# Patient Record
Sex: FEMALE | Race: WHITE | NOT HISPANIC OR LATINO | Employment: OTHER | URBAN - METROPOLITAN AREA
[De-identification: names, ages, dates, MRNs, and addresses within clinical notes are randomized per-mention and may not be internally consistent; named-entity substitution may affect disease eponyms.]

---

## 2022-07-14 ENCOUNTER — APPOINTMENT (OUTPATIENT)
Dept: LAB | Facility: HOSPITAL | Age: 82
End: 2022-07-14
Payer: COMMERCIAL

## 2022-07-14 DIAGNOSIS — Z95.2 HEART VALVE REPLACED BY TRANSPLANT: ICD-10-CM

## 2022-07-14 LAB
INR PPP: 2.84 (ref 0.84–1.19)
PROTHROMBIN TIME: 28.4 SECONDS (ref 11.6–14.5)

## 2022-07-14 PROCEDURE — 85610 PROTHROMBIN TIME: CPT

## 2022-07-14 PROCEDURE — 36415 COLL VENOUS BLD VENIPUNCTURE: CPT

## 2022-07-21 ENCOUNTER — APPOINTMENT (OUTPATIENT)
Dept: LAB | Facility: HOSPITAL | Age: 82
End: 2022-07-21
Payer: COMMERCIAL

## 2022-10-06 ENCOUNTER — APPOINTMENT (OUTPATIENT)
Dept: LAB | Facility: HOSPITAL | Age: 82
End: 2022-10-06
Payer: COMMERCIAL

## 2022-10-08 ENCOUNTER — LAB REQUISITION (OUTPATIENT)
Dept: LAB | Facility: HOSPITAL | Age: 82
End: 2022-10-08
Payer: COMMERCIAL

## 2022-10-08 DIAGNOSIS — R32 UNSPECIFIED URINARY INCONTINENCE: ICD-10-CM

## 2022-10-08 LAB
BACTERIA UR QL AUTO: ABNORMAL /HPF
BILIRUB UR QL STRIP: NEGATIVE
CLARITY UR: CLEAR
COLOR UR: ABNORMAL
GLUCOSE UR STRIP-MCNC: NEGATIVE MG/DL
HGB UR QL STRIP.AUTO: NEGATIVE
KETONES UR STRIP-MCNC: NEGATIVE MG/DL
LEUKOCYTE ESTERASE UR QL STRIP: ABNORMAL
NITRITE UR QL STRIP: NEGATIVE
NON-SQ EPI CELLS URNS QL MICRO: ABNORMAL /HPF
PH UR STRIP.AUTO: 6.5 [PH]
PROT UR STRIP-MCNC: NEGATIVE MG/DL
RBC #/AREA URNS AUTO: ABNORMAL /HPF
SP GR UR STRIP.AUTO: 1.01 (ref 1–1.03)
UROBILINOGEN UR STRIP-ACNC: <2 MG/DL
WBC #/AREA URNS AUTO: ABNORMAL /HPF

## 2022-10-08 PROCEDURE — 81001 URINALYSIS AUTO W/SCOPE: CPT | Performed by: FAMILY MEDICINE

## 2022-10-13 ENCOUNTER — APPOINTMENT (OUTPATIENT)
Dept: LAB | Facility: HOSPITAL | Age: 82
End: 2022-10-13
Payer: COMMERCIAL

## 2022-10-13 DIAGNOSIS — Z95.2 HEART VALVE REPLACED BY TRANSPLANT: ICD-10-CM

## 2022-10-13 DIAGNOSIS — I48.11 LONGSTANDING PERSISTENT ATRIAL FIBRILLATION (HCC): ICD-10-CM

## 2022-10-13 DIAGNOSIS — I10 ESSENTIAL HYPERTENSION, MALIGNANT: ICD-10-CM

## 2022-10-13 LAB
ALBUMIN SERPL BCP-MCNC: 4 G/DL (ref 3.5–5)
ALP SERPL-CCNC: 111 U/L (ref 46–116)
ALT SERPL W P-5'-P-CCNC: 25 U/L (ref 12–78)
ANION GAP SERPL CALCULATED.3IONS-SCNC: 7 MMOL/L (ref 4–13)
AST SERPL W P-5'-P-CCNC: 19 U/L (ref 5–45)
BASOPHILS # BLD AUTO: 0.08 THOUSANDS/ΜL (ref 0–0.1)
BASOPHILS NFR BLD AUTO: 1 % (ref 0–1)
BILIRUB SERPL-MCNC: 0.4 MG/DL (ref 0.2–1)
BUN SERPL-MCNC: 13 MG/DL (ref 5–25)
CALCIUM SERPL-MCNC: 9.6 MG/DL (ref 8.3–10.1)
CHLORIDE SERPL-SCNC: 98 MMOL/L (ref 96–108)
CO2 SERPL-SCNC: 29 MMOL/L (ref 21–32)
CREAT SERPL-MCNC: 0.77 MG/DL (ref 0.6–1.3)
EOSINOPHIL # BLD AUTO: 0.21 THOUSAND/ΜL (ref 0–0.61)
EOSINOPHIL NFR BLD AUTO: 3 % (ref 0–6)
ERYTHROCYTE [DISTWIDTH] IN BLOOD BY AUTOMATED COUNT: 14.9 % (ref 11.6–15.1)
GFR SERPL CREATININE-BSD FRML MDRD: 72 ML/MIN/1.73SQ M
GLUCOSE P FAST SERPL-MCNC: 94 MG/DL (ref 65–99)
HCT VFR BLD AUTO: 41.5 % (ref 34.8–46.1)
HGB BLD-MCNC: 12.5 G/DL (ref 11.5–15.4)
IMM GRANULOCYTES # BLD AUTO: 0.04 THOUSAND/UL (ref 0–0.2)
IMM GRANULOCYTES NFR BLD AUTO: 1 % (ref 0–2)
LYMPHOCYTES # BLD AUTO: 0.72 THOUSANDS/ΜL (ref 0.6–4.47)
LYMPHOCYTES NFR BLD AUTO: 9 % (ref 14–44)
MCH RBC QN AUTO: 28.6 PG (ref 26.8–34.3)
MCHC RBC AUTO-ENTMCNC: 30.1 G/DL (ref 31.4–37.4)
MCV RBC AUTO: 95 FL (ref 82–98)
MONOCYTES # BLD AUTO: 0.68 THOUSAND/ΜL (ref 0.17–1.22)
MONOCYTES NFR BLD AUTO: 9 % (ref 4–12)
NEUTROPHILS # BLD AUTO: 6.07 THOUSANDS/ΜL (ref 1.85–7.62)
NEUTS SEG NFR BLD AUTO: 77 % (ref 43–75)
NRBC BLD AUTO-RTO: 0 /100 WBCS
PLATELET # BLD AUTO: 286 THOUSANDS/UL (ref 149–390)
PMV BLD AUTO: 10.8 FL (ref 8.9–12.7)
POTASSIUM SERPL-SCNC: 3.8 MMOL/L (ref 3.5–5.3)
PROT SERPL-MCNC: 7.4 G/DL (ref 6.4–8.4)
RBC # BLD AUTO: 4.37 MILLION/UL (ref 3.81–5.12)
SODIUM SERPL-SCNC: 134 MMOL/L (ref 135–147)
WBC # BLD AUTO: 7.8 THOUSAND/UL (ref 4.31–10.16)

## 2022-10-13 PROCEDURE — 80053 COMPREHEN METABOLIC PANEL: CPT

## 2022-10-13 PROCEDURE — 85025 COMPLETE CBC W/AUTO DIFF WBC: CPT

## 2022-10-20 ENCOUNTER — APPOINTMENT (OUTPATIENT)
Dept: LAB | Facility: HOSPITAL | Age: 82
End: 2022-10-20
Payer: COMMERCIAL

## 2022-10-20 DIAGNOSIS — Z95.2 PRESENCE OF PROSTHETIC HEART VALVE: ICD-10-CM

## 2022-10-20 LAB
INR PPP: 2.47 (ref 0.84–1.19)
PROTHROMBIN TIME: 27.2 SECONDS (ref 11.6–14.5)

## 2022-10-20 PROCEDURE — 85610 PROTHROMBIN TIME: CPT

## 2022-10-20 PROCEDURE — 36415 COLL VENOUS BLD VENIPUNCTURE: CPT

## 2022-10-27 ENCOUNTER — APPOINTMENT (OUTPATIENT)
Dept: LAB | Facility: HOSPITAL | Age: 82
End: 2022-10-27
Payer: COMMERCIAL

## 2022-10-27 DIAGNOSIS — Z95.2 PRESENCE OF PROSTHETIC HEART VALVE: ICD-10-CM

## 2022-10-27 LAB
INR PPP: 2.23 (ref 0.84–1.19)
PROTHROMBIN TIME: 25 SECONDS (ref 11.6–14.5)

## 2022-10-27 PROCEDURE — 36415 COLL VENOUS BLD VENIPUNCTURE: CPT

## 2022-10-27 PROCEDURE — 85610 PROTHROMBIN TIME: CPT

## 2022-11-03 ENCOUNTER — APPOINTMENT (OUTPATIENT)
Dept: LAB | Facility: HOSPITAL | Age: 82
End: 2022-11-03

## 2022-11-03 DIAGNOSIS — Z95.2 PRESENCE OF PROSTHETIC HEART VALVE: ICD-10-CM

## 2022-11-03 LAB
INR PPP: 2.8 (ref 0.84–1.19)
PROTHROMBIN TIME: 29.7 SECONDS (ref 11.6–14.5)

## 2022-11-10 ENCOUNTER — APPOINTMENT (OUTPATIENT)
Dept: LAB | Facility: HOSPITAL | Age: 82
End: 2022-11-10

## 2022-11-10 DIAGNOSIS — Z95.2 PRESENCE OF PROSTHETIC HEART VALVE: ICD-10-CM

## 2022-11-10 LAB
INR PPP: 2.96 (ref 0.84–1.19)
PROTHROMBIN TIME: 31.1 SECONDS (ref 11.6–14.5)

## 2022-11-17 ENCOUNTER — APPOINTMENT (OUTPATIENT)
Dept: LAB | Facility: HOSPITAL | Age: 82
End: 2022-11-17

## 2022-11-17 DIAGNOSIS — Z95.2 PRESENCE OF PROSTHETIC HEART VALVE: ICD-10-CM

## 2022-11-17 LAB
INR PPP: 3.16 (ref 0.84–1.19)
PROTHROMBIN TIME: 32.7 SECONDS (ref 11.6–14.5)

## 2022-11-23 ENCOUNTER — APPOINTMENT (OUTPATIENT)
Dept: LAB | Facility: HOSPITAL | Age: 82
End: 2022-11-23

## 2022-11-23 DIAGNOSIS — Z95.2 PRESENCE OF PROSTHETIC HEART VALVE: ICD-10-CM

## 2022-11-23 LAB
INR PPP: 4.02 (ref 0.84–1.19)
PROTHROMBIN TIME: 39.4 SECONDS (ref 11.6–14.5)

## 2022-12-01 ENCOUNTER — APPOINTMENT (OUTPATIENT)
Dept: LAB | Facility: HOSPITAL | Age: 82
End: 2022-12-01

## 2022-12-01 DIAGNOSIS — Z95.2 PRESENCE OF PROSTHETIC HEART VALVE: ICD-10-CM

## 2022-12-01 LAB
INR PPP: 1.87 (ref 0.84–1.19)
PROTHROMBIN TIME: 22 SECONDS (ref 11.6–14.5)

## 2022-12-08 ENCOUNTER — APPOINTMENT (OUTPATIENT)
Dept: LAB | Facility: HOSPITAL | Age: 82
End: 2022-12-08

## 2022-12-08 DIAGNOSIS — Z95.2 HEART VALVE REPLACED BY TRANSPLANT: ICD-10-CM

## 2022-12-08 LAB
INR PPP: 2.22 (ref 0.84–1.19)
PROTHROMBIN TIME: 24.9 SECONDS (ref 11.6–14.5)

## 2022-12-15 ENCOUNTER — APPOINTMENT (OUTPATIENT)
Dept: LAB | Facility: HOSPITAL | Age: 82
End: 2022-12-15

## 2022-12-22 ENCOUNTER — APPOINTMENT (OUTPATIENT)
Dept: LAB | Facility: HOSPITAL | Age: 82
End: 2022-12-22

## 2022-12-29 ENCOUNTER — APPOINTMENT (OUTPATIENT)
Dept: LAB | Facility: HOSPITAL | Age: 82
End: 2022-12-29

## 2022-12-29 DIAGNOSIS — D64.9 ANEMIA, UNSPECIFIED TYPE: ICD-10-CM

## 2022-12-29 DIAGNOSIS — Z96.20 PRESENCE OF OTOLOGICAL AND AUDIOLOGICAL IMPLANT, UNSPECIFIED: ICD-10-CM

## 2022-12-29 LAB
ANION GAP SERPL CALCULATED.3IONS-SCNC: 6 MMOL/L (ref 4–13)
BUN SERPL-MCNC: 8 MG/DL (ref 5–25)
CALCIUM SERPL-MCNC: 9 MG/DL (ref 8.3–10.1)
CHLORIDE SERPL-SCNC: 95 MMOL/L (ref 96–108)
CO2 SERPL-SCNC: 34 MMOL/L (ref 21–32)
CREAT SERPL-MCNC: 0.83 MG/DL (ref 0.6–1.3)
FOLATE SERPL-MCNC: >20 NG/ML (ref 3.1–17.5)
GFR SERPL CREATININE-BSD FRML MDRD: 65 ML/MIN/1.73SQ M
GLUCOSE SERPL-MCNC: 87 MG/DL (ref 65–140)
INR PPP: 5.57 (ref 0.84–1.19)
POTASSIUM SERPL-SCNC: 4.2 MMOL/L (ref 3.5–5.3)
PROTHROMBIN TIME: 52.6 SECONDS (ref 11.6–14.5)
SODIUM SERPL-SCNC: 135 MMOL/L (ref 135–147)

## 2023-01-05 ENCOUNTER — APPOINTMENT (OUTPATIENT)
Dept: LAB | Facility: HOSPITAL | Age: 83
End: 2023-01-05

## 2023-01-19 ENCOUNTER — APPOINTMENT (OUTPATIENT)
Dept: LAB | Facility: HOSPITAL | Age: 83
End: 2023-01-19

## 2023-01-23 ENCOUNTER — HOSPITAL ENCOUNTER (EMERGENCY)
Facility: HOSPITAL | Age: 83
Discharge: HOME | End: 2023-01-23
Attending: EMERGENCY MEDICINE

## 2023-01-23 ENCOUNTER — APPOINTMENT (EMERGENCY)
Dept: RADIOLOGY | Facility: HOSPITAL | Age: 83
End: 2023-01-23

## 2023-01-23 ENCOUNTER — APPOINTMENT (EMERGENCY)
Dept: CT IMAGING | Facility: HOSPITAL | Age: 83
End: 2023-01-23

## 2023-01-23 VITALS
WEIGHT: 162 LBS | SYSTOLIC BLOOD PRESSURE: 139 MMHG | TEMPERATURE: 98.4 F | RESPIRATION RATE: 18 BRPM | BODY MASS INDEX: 26.03 KG/M2 | DIASTOLIC BLOOD PRESSURE: 61 MMHG | OXYGEN SATURATION: 95 % | HEIGHT: 66 IN | HEART RATE: 78 BPM

## 2023-01-23 DIAGNOSIS — S29.012A STRAIN OF THORACIC PARASPINAL MUSCLES EXCLUDING T1 AND T2 LEVELS: ICD-10-CM

## 2023-01-23 DIAGNOSIS — S39.012A LUMBAR STRAIN, INITIAL ENCOUNTER: ICD-10-CM
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
## 2023-01-23 LAB
ANION GAP SERPL CALCULATED.3IONS-SCNC: 7 MMOL/L (ref 4–13)
BASOPHILS # BLD AUTO: 0.07 THOUSANDS/ÂΜL (ref 0–0.1)
BASOPHILS NFR BLD AUTO: 1 % (ref 0–1)
BILIRUB UR QL STRIP: NEGATIVE
BUN SERPL-MCNC: 9 MG/DL (ref 5–25)
CALCIUM SERPL-MCNC: 9 MG/DL (ref 8.3–10.1)
CHLORIDE SERPL-SCNC: 94 MMOL/L (ref 96–108)
CLARITY UR: CLEAR
CO2 SERPL-SCNC: 34 MMOL/L (ref 21–32)
COLOR UR: NORMAL
CREAT SERPL-MCNC: 0.83 MG/DL (ref 0.6–1.3)
EOSINOPHIL # BLD AUTO: 0.13 THOUSAND/ÂΜL (ref 0–0.61)
EOSINOPHIL NFR BLD AUTO: 2 % (ref 0–6)
ERYTHROCYTE [DISTWIDTH] IN BLOOD BY AUTOMATED COUNT: 15.3 % (ref 11.6–15.1)
GFR SERPL CREATININE-BSD FRML MDRD: 65 ML/MIN/1.73SQ M
GLUCOSE SERPL-MCNC: 108 MG/DL (ref 65–140)
GLUCOSE UR STRIP-MCNC: NEGATIVE MG/DL
HCT VFR BLD AUTO: 38.1 % (ref 34.8–46.1)
HGB BLD-MCNC: 12.2 G/DL (ref 11.5–15.4)
HGB UR QL STRIP.AUTO: NEGATIVE
IMM GRANULOCYTES # BLD AUTO: 0.05 THOUSAND/UL (ref 0–0.2)
IMM GRANULOCYTES NFR BLD AUTO: 1 % (ref 0–2)
INR PPP: 3.48 (ref 0.84–1.19)
KETONES UR STRIP-MCNC: NEGATIVE MG/DL
LEUKOCYTE ESTERASE UR QL STRIP: NEGATIVE
LYMPHOCYTES # BLD AUTO: 0.85 THOUSANDS/ÂΜL (ref 0.6–4.47)
LYMPHOCYTES NFR BLD AUTO: 11 % (ref 14–44)
MCH RBC QN AUTO: 29.7 PG (ref 26.8–34.3)
MCHC RBC AUTO-ENTMCNC: 32 G/DL (ref 31.4–37.4)
MCV RBC AUTO: 93 FL (ref 82–98)
MONOCYTES # BLD AUTO: 0.72 THOUSAND/ÂΜL (ref 0.17–1.22)
MONOCYTES NFR BLD AUTO: 10 % (ref 4–12)
NEUTROPHILS # BLD AUTO: 5.63 THOUSANDS/ÂΜL (ref 1.85–7.62)
NEUTS SEG NFR BLD AUTO: 75 % (ref 43–75)
NITRITE UR QL STRIP: NEGATIVE
NRBC BLD AUTO-RTO: 0 /100 WBCS
PH UR STRIP.AUTO: 7.5 [PH]
PLATELET # BLD AUTO: 249 THOUSANDS/UL (ref 149–390)
PMV BLD AUTO: 9.2 FL (ref 8.9–12.7)
POTASSIUM SERPL-SCNC: 3.7 MMOL/L (ref 3.5–5.3)
PROT UR STRIP-MCNC: NEGATIVE MG/DL
PROTHROMBIN TIME: 36.6 SECONDS (ref 11.6–14.5)
RBC # BLD AUTO: 4.11 MILLION/UL (ref 3.81–5.12)
SODIUM SERPL-SCNC: 135 MMOL/L (ref 135–147)
SP GR UR STRIP.AUTO: 1.01 (ref 1–1.03)
UROBILINOGEN UR STRIP-ACNC: <2 MG/DL
WBC # BLD AUTO: 7.45 THOUSAND/UL (ref 4.31–10.16)

## 2023-01-23 RX ORDER — LIDOCAINE 50 MG/G
1 PATCH TOPICAL ONCE
Status: DISCONTINUED | OUTPATIENT
Start: 2023-01-23 | End: 2023-01-23 | Stop reason: HOSPADM

## 2023-01-23 RX ORDER — ACETAMINOPHEN 325 MG/1
650 TABLET ORAL ONCE
Status: COMPLETED | OUTPATIENT
Start: 2023-01-23 | End: 2023-01-23

## 2023-01-23 RX ADMIN — ACETAMINOPHEN 650 MG: 325 TABLET ORAL at 17:54

## 2023-01-23 RX ADMIN — LIDOCAINE 5% 1 PATCH: 700 PATCH TOPICAL at 17:54

## 2023-01-23 NOTE — DISCHARGE INSTRUCTIONS
Continue Tylenol and lidocaine patch as needed  Consider using a different topical analgesic cream or patch if needed  Continue present medications  Follow-up with your doctor as needed

## 2023-01-23 NOTE — ED PROVIDER NOTES
Emergency Department Trauma Note  Ramos Bowers 80 y o  female MRN: [id number]  Unit/Bed#: ED 11/ED 11 Encounter: [id number]      Trauma Alert: Trauma Acuity: Trauma Evaluation  Model of Arrival: Mode of Arrival: ALS via Trauma Squad Name and Number: Zaynab Jordan Team: Current Providers  Attending Provider: Casie Fleming DO  Registered Nurse: Santos Bolton RN  Consultants:     None      History of Present Illness     Chief Complaint:   Chief Complaint   Patient presents with   • Fall     Patient presents to the ER via EMS from [employer] with pain in her right flank post falling in the shower today  HPI:  Ramos Bowers is a 80 y o  female who presents with back and right flank pain  Mechanism:Details of Incident: Patient reportedly slipped and fell in the shower at [employer] injuring her right flank  Injury Date: 01/23/23 Injury Time: 1200 Injury Occurence Location - [address] [place] [address] Way: [facility]    80year-old female with history of COPD, aortic valve replacement, permanent A  fib, permanent pacemaker implantation, hypertension, hyperlipidemia, SAM with use of BiPAP and epilepsy was showering at nursing home today  She typically has to hold onto the grab bars but today her hand was soapy and slippery  She fell backwards  Complains of pain in the right flank area where she had prior rib fractures  Denies injury to head, dyspnea, abdominal pain and injury to pelvis or extremities  Denies loss of consciousness and focal neurologic changes  Nursing staff came up on her awake and helped her up just after her fall  Review of Systems   Constitutional: Negative for fever  Respiratory: Negative for shortness of breath  Cardiovascular: Negative for chest pain  Gastrointestinal: Negative for abdominal pain  Musculoskeletal: Positive for arthralgias, back pain, gait problem and myalgias     Neurological: Negative for dizziness, light-headedness and numbness  Historical Information     Immunizations: There is no immunization history on file for this patient  Past Medical History:   Diagnosis Date   • Anemia    • Anxiety disorder, unspecified    • Anxiety disorder, unspecified    • Cardiac arrest, cause unspecified ([facility] [address] )    • Chronic obstructive pulmonary disease, unspecified ([facility] [address] )    • Constipation    • Essential (primary) hypertension    • GERD (gastroesophageal reflux disease)    • Hypo-osmolality and hyponatremia    • Hypothyroidism    • Nontraumatic hematoma of soft tissue    • Other seizures (HCC)    • Persistent atrial fibrillation (HCC)    • Unspecified urinary incontinence      History reviewed  No pertinent family history  History reviewed  No pertinent surgical history  Social History     Tobacco Use   • Smoking status: Never   • Smokeless tobacco: Never   Vaping Use   • Vaping Use: Never used   Substance Use Topics   • Alcohol use: Not Currently   • Drug use: Never     E-Cigarette/Vaping   • E-Cigarette Use Never User      E-Cigarette/Vaping Substances       Family History: non-contributory    Meds/Allergies   None       Allergies   Allergen Reactions   • Erythromycin Photosensitivity   • Ephedrine Rash   • Iodinated Contrast Media Rash   • Latex Rash   • Nsaids Rash   • Povidone Iodine Rash   • Salicylates Rash   • Shrimp (Diagnostic) - Food Allergy Rash   • Sympathomimetics Rash   • Tiotropium Rash   • Tositumomab Rash       PHYSICAL EXAM    PE limited by: Flank pain and low back pain    Objective   Vitals:   First set: Temperature: 98 2 °F (36 8 °C) (01/23/23 1423)  Pulse: 79 (01/23/23 1423)  Respirations: 18 (01/23/23 1423)  Blood Pressure: 139/73 (01/23/23 1423)  SpO2: 95 % (01/23/23 1423)    Primary Survey:   (A) Airway: Normal vocalizations    (B) Breathing: Symmetric air intake and chest rise  (C) Circulation: Pulses:   normal  (D) Disabliity:  GCS Total:  15  (E) Expose:  Completed    Secondary Survey: (Click on Physical Exam tab above)  Physical Exam  Vitals and nursing note reviewed  Constitutional:       General: She is in acute distress  Appearance: She is well-developed and normal weight  She is not ill-appearing or diaphoretic  HENT:      Head: Normocephalic and atraumatic  Right Ear: Tympanic membrane, ear canal and external ear normal       Left Ear: Tympanic membrane, ear canal and external ear normal       Nose: Nose normal       Mouth/Throat:      Mouth: Mucous membranes are moist       Pharynx: Oropharynx is clear  Eyes:      General: No scleral icterus  Conjunctiva/sclera: Conjunctivae normal       Pupils: Pupils are equal, round, and reactive to light  Neck:      Vascular: No carotid bruit  Cardiovascular:      Rate and Rhythm: Normal rate and regular rhythm  Pulses: Normal pulses  Heart sounds: Normal heart sounds  No murmur heard  Pulmonary:      Effort: Pulmonary effort is normal       Breath sounds: Normal breath sounds  Chest:      Chest wall: Tenderness:  Tendernesx over right ribs 10-12 without ecchymosis or swelling  No crepitance appreciated  Abdominal:      General: Bowel sounds are normal       Palpations: Abdomen is soft  There is no mass  Tenderness: There is no abdominal tenderness  There is no guarding or rebound  Musculoskeletal:         General: Tenderness ( Right lower postrior ribs, midline lumbar spine) present  No deformity  Normal range of motion  Cervical back: Normal range of motion and neck supple  No tenderness  Skin:     General: Skin is warm and dry  Capillary Refill: Capillary refill takes less than 2 seconds  Findings: No bruising, lesion or rash  Neurological:      General: No focal deficit present  Mental Status: She is alert and oriented to person, place, and time  Mental status is at baseline  Cranial Nerves: No cranial nerve deficit  Sensory: No sensory deficit  Motor: No weakness  Coordination: Coordination normal       Gait: Gait abnormal       Deep Tendon Reflexes: Reflexes are normal and symmetric  Psychiatric:         Mood and Affect: Mood normal          Behavior: Behavior normal          Cervical spine cleared by clinical criteria?  No (imaging required)      Invasive Devices     None                 Lab Results:   Results Reviewed     Procedure Component Value Units Date/Time    UA w Reflex to Microscopic w Reflex to Culture [[id number]] Collected: 01/23/23 1632    Lab Status: Final result Specimen: Urine, Clean Catch Updated: 01/23/23 1714     Color, UA Light Yellow     Clarity, UA Clear     Specific Gravity, UA 1 015     pH, UA 7 5     Leukocytes, UA Negative     Nitrite, UA Negative     Protein, UA Negative mg/dl      Glucose, UA Negative mg/dl      Ketones, UA Negative mg/dl      Urobilinogen, UA <2 0 mg/dl      Bilirubin, UA Negative     Occult Blood, UA Negative    Protime-INR [[id number]]  (Abnormal) Collected: 01/23/23 1513    Lab Status: Final result Specimen: Blood from Arm, Right Updated: 01/23/23 1531     Protime 36 6 seconds      INR 3 90    Basic metabolic panel [[id number]]  (Abnormal) Collected: 01/23/23 1441    Lab Status: Final result Specimen: Blood from Arm, Right Updated: 01/23/23 1502     Sodium 135 mmol/L      Potassium 3 7 mmol/L      Chloride 94 mmol/L      CO2 34 mmol/L      ANION GAP 7 mmol/L      BUN 9 mg/dL      Creatinine 0 83 mg/dL      Glucose 108 mg/dL      Calcium 9 0 mg/dL      eGFR 65 ml/min/1 73sq m     Narrative:      [place] guidelines for Chronic Kidney Disease (CKD):   •  Stage 1 with normal or high GFR (GFR > 90 mL/min/1 73 square meters)  •  Stage 2 Mild CKD (GFR = 60-89 mL/min/1 73 square meters)  •  Stage 3A Moderate CKD (GFR = 45-59 mL/min/1 73 square meters)  •  Stage 3B Moderate CKD (GFR = 30-44 mL/min/1 73 square meters)  •  Stage 4 Severe CKD (GFR = 15-29 mL/min/1 73 square meters)  •  Stage 5 End Stage CKD (GFR <15 mL/min/1 73 square meters)  Note: GFR calculation is accurate only with a steady state creatinine    CBC and differential [[id number]]  (Abnormal) Collected: 01/23/23 1441    Lab Status: Final result Specimen: Blood from Arm, Right Updated: 01/23/23 1448     WBC 7 45 Thousand/uL      RBC 4 11 Million/uL      Hemoglobin 12 2 g/dL      Hematocrit 38 1 %      MCV 93 fL      MCH 29 7 pg      MCHC 32 0 g/dL      RDW 15 3 %      MPV 9 2 fL      Platelets 804 Thousands/uL      nRBC 0 /100 WBCs      Neutrophils Relative 75 %      Immat GRANS % 1 %      Lymphocytes Relative 11 %      Monocytes Relative 10 %      Eosinophils Relative 2 %      Basophils Relative 1 %      Neutrophils Absolute 5 63 Thousands/µL      Immature Grans Absolute 0 05 Thousand/uL      Lymphocytes Absolute 0 85 Thousands/µL      Monocytes Absolute 0 72 Thousand/µL      Eosinophils Absolute 0 13 Thousand/µL      Basophils Absolute 0 07 Thousands/µL                  Imaging Studies:   Direct to CT: No  TRAUMA - CT head wo contrast   Final Result by David Bella MD (01/23 2627)      No acute intracranial abnormality  Generalized atrophy and moderate cerebral chronic microangiopathic disease  Workstation performed: [id number]         TRAUMA - CT spine cervical wo contrast   Final Result by David Bella MD (01/23 7846)      No cervical spine fracture or traumatic malalignment  Multilevel cervical spondylosis as described above  Visualized upper chest demonstrates multiple bilateral pulmonary nodules, the largest noted in the left upper lobe measures 6 mm  Please see the separate CT chest, abdomen and pelvis study report recommendation is on appropriate follow-up  Workstation performed: [id number]         CT recon only lumbar spine   Final Result by David Bella MD (01/23 4550)      Partially corticated right L1 transverse process fracture is likely subacute to chronic    Additional finding of healing right T11 and T12 rib fractures  Workstation performed: [id number]         CT chest abdomen pelvis wo contrast   Final Result by Rebeca Ross MD (01/23 1642)      Numerous small pulmonary nodules, measuring up to 6 mm, chronicity unknown  These could be the sequela of prior fungal or mycobacterial infection  However, pulmonary metastases could have a similar appearance  Several small bilateral nonobstructing renal calculi  Large left inguinal hernia containing a long segment of descending colon and proximal sigmoid, without evidence of obstruction or incarceration  Several chronic fractures described above  No evidence of acute fracture  No evidence of acute abnormality in the chest, abdomen or pelvis  Workstation performed: [id number]         XR Trauma chest portable   Final Result by Adriana Marcelino MD (01/23 2239)      No acute cardiopulmonary disease  Left-sided rib fractures seen on subsequent CT are not well evaluated by radiograph  Workstation performed: [id number]         XR Trauma pelvis ap only 1 or 2 vw   Final Result by Adriana Marcelino MD (01/23 9446)      No acute osseous abnormality  Workstation performed: [id number]               Procedures  ECG 12 Lead Documentation Only    Date/Time: 1/23/2023 3:25 PM  Performed by: Mari Taylor DO  Authorized by: Mari Taylor DO     ECG reviewed by me, the ED Provider: yes    Patient location:  ED  Previous ECG:     Previous ECG:  Unavailable    Comparison to cardiac monitor: Yes               ED Course  ED Course as of 01/26/23 1802   Mon Jan 23, 2023   1636 Despite ordering trauma imaging, still waiting for the CT results   1702 Labs and imaging reviewed  Spoke again with patient  She feels she probably can make it back to the assisted living home with her Rollator, Tylenol and lidocaine patch  We will medicate her and trial her on her feet     [address] on ambulating patient with expectation that she will be discharged  Nurse has been busy  1858 Ambulates well with walker  Nephew here to pick her up  Medical Decision Making  Slip and fall from standing with acute right flank/chest wall pain  No syncope, LOC and no sign of ACS, CVA, major injury  On warfarin for AVR and afib  No head strike  Labs, imaging and repeat evaluation reassuring  INR appropriately therapeutic, no evidence of bleeding  Ambulatory with walker  Stable for D/C  Return instructions given  Fall from standing, initial encounter: acute illness or injury  Lumbar strain, initial encounter: acute illness or injury  Strain of thoracic paraspinal muscles excluding T1 and T2 levels: acute illness or injury  Amount and/or Complexity of Data Reviewed  Labs: ordered  Radiology: ordered  Risk  OTC drugs  Disposition  [facility] Transfer: No  Final diagnoses:   Fall from standing, initial encounter   Strain of thoracic paraspinal muscles excluding T1 and T2 levels   Lumbar strain, initial encounter     Time reflects when diagnosis was documented in both MDM as applicable and the Disposition within this note     Time User Action Codes Description Comment    1/23/2023  4:51 PM Tho Pritchard Add [C60  XXXA] Fall from standing, initial encounter     1/23/2023  4:51 PM Tho Pritchard Add [A12 734E] Strain of thoracic paraspinal muscles excluding T1 and T2 levels     1/23/2023  4:51 PM Tho Pritchard Add [G28 164V] Lumbar strain, initial encounter       ED Disposition     ED Disposition   Discharge    Condition   Stable    Date/Time   Mon Jan 23, 2023  4:51 PM    Comment   Caroline Kennedy DAYBREAK OF [place] discharge to home/self care  Follow-up Information     Follow up With Specialties Details Why Contact Info    Ervin Quiros MD  Call  As needed [address] [place] [address] [id number] [phone]          There are no discharge medications for this patient      No discharge procedures on file      PDMP Review     None          ED Provider  Electronically Signed by         Aubrey Méndez DO  01/26/23 2551

## 2023-01-25 LAB
ATRIAL RATE: 100 BPM
QRS AXIS: 45 DEGREES
QRSD INTERVAL: 90 MS
QT INTERVAL: 364 MS
QTC INTERVAL: 440 MS
T WAVE AXIS: 84 DEGREES
VENTRICULAR RATE: 88 BPM

## 2023-01-26 ENCOUNTER — APPOINTMENT (OUTPATIENT)
Dept: LAB | Facility: HOSPITAL | Age: 83
End: 2023-01-26

## 2023-02-02 ENCOUNTER — APPOINTMENT (OUTPATIENT)
Dept: LAB | Facility: HOSPITAL | Age: 83
End: 2023-02-02

## 2023-02-02 DIAGNOSIS — Z95.2 HEART VALVE REPLACED BY TRANSPLANT: ICD-10-CM

## 2023-02-02 DIAGNOSIS — E87.1 HYPOSMOLALITY SYNDROME: ICD-10-CM

## 2023-02-02 LAB
ALBUMIN SERPL BCP-MCNC: 4.2 G/DL (ref 3.5–5)
ALP SERPL-CCNC: 118 U/L (ref 46–116)
ALT SERPL W P-5'-P-CCNC: 27 U/L (ref 12–78)
ANION GAP SERPL CALCULATED.3IONS-SCNC: 5 MMOL/L (ref 4–13)
AST SERPL W P-5'-P-CCNC: 27 U/L (ref 5–45)
BILIRUB SERPL-MCNC: 0.47 MG/DL (ref 0.2–1)
BUN SERPL-MCNC: 10 MG/DL (ref 5–25)
CALCIUM SERPL-MCNC: 9.9 MG/DL (ref 8.3–10.1)
CHLORIDE SERPL-SCNC: 90 MMOL/L (ref 96–108)
CHOLEST SERPL-MCNC: 179 MG/DL
CO2 SERPL-SCNC: 35 MMOL/L (ref 21–32)
CREAT SERPL-MCNC: 0.67 MG/DL (ref 0.6–1.3)
ERYTHROCYTE [DISTWIDTH] IN BLOOD BY AUTOMATED COUNT: 14.9 % (ref 11.6–15.1)
GFR SERPL CREATININE-BSD FRML MDRD: 82 ML/MIN/1.73SQ M
GLUCOSE P FAST SERPL-MCNC: 82 MG/DL (ref 65–99)
HCT VFR BLD AUTO: 40.6 % (ref 34.8–46.1)
HDLC SERPL-MCNC: 72 MG/DL
HGB BLD-MCNC: 12.9 G/DL (ref 11.5–15.4)
LDLC SERPL CALC-MCNC: 86 MG/DL (ref 0–100)
MCH RBC QN AUTO: 29.9 PG (ref 26.8–34.3)
MCHC RBC AUTO-ENTMCNC: 31.8 G/DL (ref 31.4–37.4)
MCV RBC AUTO: 94 FL (ref 82–98)
NONHDLC SERPL-MCNC: 107 MG/DL
PLATELET # BLD AUTO: 278 THOUSANDS/UL (ref 149–390)
PMV BLD AUTO: 10.4 FL (ref 8.9–12.7)
POTASSIUM SERPL-SCNC: 3.9 MMOL/L (ref 3.5–5.3)
PROT SERPL-MCNC: 7.9 G/DL (ref 6.4–8.4)
RBC # BLD AUTO: 4.32 MILLION/UL (ref 3.81–5.12)
SODIUM SERPL-SCNC: 130 MMOL/L (ref 135–147)
TRIGL SERPL-MCNC: 106 MG/DL
WBC # BLD AUTO: 7.45 THOUSAND/UL (ref 4.31–10.16)

## 2023-02-09 ENCOUNTER — APPOINTMENT (OUTPATIENT)
Dept: LAB | Facility: HOSPITAL | Age: 83
End: 2023-02-09

## 2023-02-14 ENCOUNTER — LAB REQUISITION (OUTPATIENT)
Dept: LAB | Facility: HOSPITAL | Age: 83
End: 2023-02-14

## 2023-02-14 DIAGNOSIS — E87.1 HYPO-OSMOLALITY AND HYPONATREMIA: ICD-10-CM

## 2023-02-14 LAB
BACTERIA UR QL AUTO: NORMAL /HPF
BILIRUB UR QL STRIP: NEGATIVE
CLARITY UR: CLEAR
COLOR UR: ABNORMAL
GLUCOSE UR STRIP-MCNC: NEGATIVE MG/DL
HGB UR QL STRIP.AUTO: NEGATIVE
KETONES UR STRIP-MCNC: NEGATIVE MG/DL
LEUKOCYTE ESTERASE UR QL STRIP: ABNORMAL
NITRITE UR QL STRIP: NEGATIVE
NON-SQ EPI CELLS URNS QL MICRO: NORMAL /HPF
PH UR STRIP.AUTO: 6.5 [PH]
PROT UR STRIP-MCNC: NEGATIVE MG/DL
RBC #/AREA URNS AUTO: NORMAL /HPF
SP GR UR STRIP.AUTO: 1.01 (ref 1–1.03)
UROBILINOGEN UR STRIP-ACNC: <2 MG/DL
WBC #/AREA URNS AUTO: NORMAL /HPF

## 2023-02-16 ENCOUNTER — APPOINTMENT (OUTPATIENT)
Dept: LAB | Facility: HOSPITAL | Age: 83
End: 2023-02-16

## 2023-02-16 DIAGNOSIS — I10 ESSENTIAL HYPERTENSION, MALIGNANT: Primary | ICD-10-CM

## 2023-02-16 LAB
ANION GAP SERPL CALCULATED.3IONS-SCNC: 3 MMOL/L (ref 4–13)
BUN SERPL-MCNC: 11 MG/DL (ref 5–25)
CALCIUM SERPL-MCNC: 9.7 MG/DL (ref 8.3–10.1)
CHLORIDE SERPL-SCNC: 93 MMOL/L (ref 96–108)
CO2 SERPL-SCNC: 34 MMOL/L (ref 21–32)
CREAT SERPL-MCNC: 0.73 MG/DL (ref 0.6–1.3)
GFR SERPL CREATININE-BSD FRML MDRD: 76 ML/MIN/1.73SQ M
GLUCOSE P FAST SERPL-MCNC: 81 MG/DL (ref 65–99)
POTASSIUM SERPL-SCNC: 4.1 MMOL/L (ref 3.5–5.3)
SODIUM SERPL-SCNC: 130 MMOL/L (ref 135–147)

## 2023-02-23 ENCOUNTER — APPOINTMENT (OUTPATIENT)
Dept: LAB | Facility: HOSPITAL | Age: 83
End: 2023-02-23

## 2023-03-02 ENCOUNTER — APPOINTMENT (OUTPATIENT)
Dept: LAB | Facility: HOSPITAL | Age: 83
End: 2023-03-02

## 2023-03-09 ENCOUNTER — APPOINTMENT (OUTPATIENT)
Dept: LAB | Facility: HOSPITAL | Age: 83
End: 2023-03-09

## 2023-03-16 ENCOUNTER — APPOINTMENT (OUTPATIENT)
Dept: LAB | Facility: HOSPITAL | Age: 83
End: 2023-03-16

## 2023-03-17 ENCOUNTER — HOSPITAL ENCOUNTER (EMERGENCY)
Facility: HOSPITAL | Age: 83
Discharge: HOME | End: 2023-03-17
Attending: EMERGENCY MEDICINE

## 2023-03-17 ENCOUNTER — APPOINTMENT (EMERGENCY)
Dept: CT IMAGING | Facility: HOSPITAL | Age: 83
End: 2023-03-17

## 2023-03-17 VITALS
SYSTOLIC BLOOD PRESSURE: 119 MMHG | HEART RATE: 74 BPM | RESPIRATION RATE: 23 BRPM | BODY MASS INDEX: 26.11 KG/M2 | TEMPERATURE: 98 F | DIASTOLIC BLOOD PRESSURE: 59 MMHG | OXYGEN SATURATION: 93 % | HEIGHT: 60 IN | WEIGHT: 133 LBS

## 2023-03-17 DIAGNOSIS — D64.9 ANEMIA: ICD-10-CM

## 2023-03-17 DIAGNOSIS — M79.604 RIGHT LEG PAIN: Primary | ICD-10-CM

## 2023-03-17 DIAGNOSIS — R79.1 SUPRATHERAPEUTIC INR: ICD-10-CM

## 2023-03-17 DIAGNOSIS — S80.11XA HEMATOMA OF LEG, RIGHT, INITIAL ENCOUNTER: ICD-10-CM

## 2023-03-17 LAB
ANION GAP SERPL CALCULATED.3IONS-SCNC: 7 MMOL/L (ref 4–13)
BASOPHILS # BLD AUTO: 0.07 THOUSANDS/ÂΜL (ref 0–0.1)
BASOPHILS NFR BLD AUTO: 1 % (ref 0–1)
BUN SERPL-MCNC: 9 MG/DL (ref 5–25)
CALCIUM SERPL-MCNC: 8.9 MG/DL (ref 8.4–10.2)
CHLORIDE SERPL-SCNC: 93 MMOL/L (ref 96–108)
CK SERPL-CCNC: 77 U/L (ref 26–192)
CO2 SERPL-SCNC: 34 MMOL/L (ref 21–32)
CREAT SERPL-MCNC: 0.64 MG/DL (ref 0.6–1.3)
CRP SERPL QL: 11.3 MG/L
EOSINOPHIL # BLD AUTO: 0.16 THOUSAND/ÂΜL (ref 0–0.61)
EOSINOPHIL NFR BLD AUTO: 2 % (ref 0–6)
ERYTHROCYTE [DISTWIDTH] IN BLOOD BY AUTOMATED COUNT: 13.7 % (ref 11.6–15.1)
GFR SERPL CREATININE-BSD FRML MDRD: 83 ML/MIN/1.73SQ M
GLUCOSE SERPL-MCNC: 91 MG/DL (ref 65–140)
HCT VFR BLD AUTO: 32.2 % (ref 34.8–46.1)
HGB BLD-MCNC: 10.6 G/DL (ref 11.5–15.4)
IMM GRANULOCYTES # BLD AUTO: 0.03 THOUSAND/UL (ref 0–0.2)
IMM GRANULOCYTES NFR BLD AUTO: 0 % (ref 0–2)
INR PPP: 5.64 (ref 0.84–1.19)
LYMPHOCYTES # BLD AUTO: 0.88 THOUSANDS/ÂΜL (ref 0.6–4.47)
LYMPHOCYTES NFR BLD AUTO: 10 % (ref 14–44)
MCH RBC QN AUTO: 30.3 PG (ref 26.8–34.3)
MCHC RBC AUTO-ENTMCNC: 32.9 G/DL (ref 31.4–37.4)
MCV RBC AUTO: 92 FL (ref 82–98)
MONOCYTES # BLD AUTO: 0.77 THOUSAND/ÂΜL (ref 0.17–1.22)
MONOCYTES NFR BLD AUTO: 9 % (ref 4–12)
NEUTROPHILS # BLD AUTO: 7.03 THOUSANDS/ÂΜL (ref 1.85–7.62)
NEUTS SEG NFR BLD AUTO: 78 % (ref 43–75)
NRBC BLD AUTO-RTO: 0 /100 WBCS
PLATELET # BLD AUTO: 244 THOUSANDS/UL (ref 149–390)
PMV BLD AUTO: 9.8 FL (ref 8.9–12.7)
POTASSIUM SERPL-SCNC: 3.6 MMOL/L (ref 3.5–5.3)
PROTHROMBIN TIME: 53.1 SECONDS (ref 11.6–14.5)
RBC # BLD AUTO: 3.5 MILLION/UL (ref 3.81–5.12)
SODIUM SERPL-SCNC: 134 MMOL/L (ref 135–147)
WBC # BLD AUTO: 8.94 THOUSAND/UL (ref 4.31–10.16)

## 2023-03-17 RX ORDER — DIPHENHYDRAMINE HYDROCHLORIDE 50 MG/ML
25 INJECTION INTRAMUSCULAR; INTRAVENOUS ONCE
Status: COMPLETED | OUTPATIENT
Start: 2023-03-17 | End: 2023-03-17

## 2023-03-17 RX ORDER — PHYTONADIONE 5 MG/1
2.5 TABLET ORAL ONCE
Status: COMPLETED | OUTPATIENT
Start: 2023-03-17 | End: 2023-03-17

## 2023-03-17 RX ADMIN — IOHEXOL 100 ML: 350 INJECTION, SOLUTION INTRAVENOUS at 04:59

## 2023-03-17 RX ADMIN — DIPHENHYDRAMINE HYDROCHLORIDE 25 MG: 50 INJECTION, SOLUTION INTRAMUSCULAR; INTRAVENOUS at 03:50

## 2023-03-17 RX ADMIN — MORPHINE SULFATE 2 MG: 2 INJECTION, SOLUTION INTRAMUSCULAR; INTRAVENOUS at 03:51

## 2023-03-17 RX ADMIN — PHYTONADIONE 2.5 MG: 5 TABLET ORAL at 08:09

## 2023-03-17 NOTE — ED PROVIDER NOTES
History  Chief Complaint   Patient presents with   • Leg Pain     EMS from [place]; fell 2 days ago and hit R leg on floor, now has pain and bruising on R calf/inner thigh     80year-old female past medical history of anxiety GERD, atrial fibrillation on Coumadin presents for evaluation of right leg pain after bumping it yesterday, nursing staff at the facility noticed swelling and patient was limping on that leg, swelling worsened this evening and patient's INR was found to be 5 1 so she was brought to the emergency department  No current chest pain, no shortness of breath, no numbness or tingling of the leg but she does complain of constant pain  None       Past Medical History:   Diagnosis Date   • Anemia    • Anxiety disorder, unspecified    • Anxiety disorder, unspecified    • Cardiac arrest, cause unspecified ([facility] [address] )    • Chronic obstructive pulmonary disease, unspecified ([facility] [address] )    • Constipation    • Essential (primary) hypertension    • GERD (gastroesophageal reflux disease)    • Hypo-osmolality and hyponatremia    • Hypothyroidism    • Nontraumatic hematoma of soft tissue    • Other seizures (HCC)    • Persistent atrial fibrillation (HCC)    • Unspecified urinary incontinence        Past Surgical History:   Procedure Laterality Date   • APPENDECTOMY     • CARDIAC PACEMAKER PLACEMENT     • FEMUR FRACTURE SURGERY         History reviewed  No pertinent family history  I have reviewed and agree with the history as documented  E-Cigarette/Vaping   • E-Cigarette Use Never User      E-Cigarette/Vaping Substances     Social History     Tobacco Use   • Smoking status: Never   • Smokeless tobacco: Never   Vaping Use   • Vaping Use: Never used   Substance Use Topics   • Alcohol use: Not Currently   • Drug use: Never       Review of Systems    Physical Exam  Physical Exam  Vitals and nursing note reviewed  Constitutional:       Appearance: She is well-developed     HENT:      Head: Normocephalic and atraumatic  Eyes:      Pupils: Pupils are equal, round, and reactive to light  Cardiovascular:      Rate and Rhythm: Normal rate and regular rhythm  Heart sounds: No murmur heard  No friction rub  No gallop  Pulmonary:      Effort: Pulmonary effort is normal       Breath sounds: No wheezing or rales  Chest:      Chest wall: No tenderness  Abdominal:      General: There is no distension  Palpations: Abdomen is soft  There is no mass  Tenderness: There is no guarding or rebound  Musculoskeletal:      Cervical back: Normal range of motion and neck supple  Comments: Bruising, swelling, tenderness to palpation to right calf of the right right knee, with area of dark black bruising, distally extremity is neurovascularly intact    The entire lower extremity from knee down is warm without any crepitus with tenderness to palpation to the bruised area with no obvious deformity    There is an old appearing scar going from knee down the right lower leg    Otherwise compartment is minimally tight   Skin:     General: Skin is warm and dry  Neurological:      Mental Status: She is alert and oriented to person, place, and time                   Vital Signs  ED Triage Vitals [03/17/23 0226]   Temperature Pulse Respirations Blood Pressure SpO2   98 °F (36 7 °C) 89 18 140/67 95 %      Temp Source Heart Rate Source Patient Position - Orthostatic VS BP Location FiO2 (%)   Temporal Monitor Lying Right arm --      Pain Score       9           Vitals:    03/17/23 0546 03/17/23 0600 03/17/23 0630 03/17/23 0700   BP: 104/59 114/59 126/59 119/59   Pulse: 79 80 75 74   Patient Position - Orthostatic VS: Lying            Visual Acuity      ED Medications  Medications   phytonadione (MEPHYTON) tablet 2 5 mg (has no administration in time range)   diphenhydrAMINE (BENADRYL) injection 25 mg (25 mg Intravenous Given 3/17/23 0350)   morphine injection 2 mg (2 mg Intravenous Given 3/17/23 0351)   iohexol (OMNIPAQUE) 350 MG/ML injection (SINGLE-DOSE) 100 mL (100 mL Intravenous Given 3/17/23 0459)       Diagnostic Studies  Results Reviewed     Procedure Component Value Units Date/Time    Protime-INR [[id number]]  (Abnormal) Collected: 03/17/23 0349    Lab Status: Final result Specimen: Blood from Arm, Right Updated: 03/17/23 0432     Protime 53 1 seconds      INR 8 78    Basic metabolic panel [[id number]]  (Abnormal) Collected: 03/17/23 0349    Lab Status: Final result Specimen: Blood from Arm, Right Updated: 03/17/23 0415     Sodium 134 mmol/L      Potassium 3 6 mmol/L      Chloride 93 mmol/L      CO2 34 mmol/L      ANION GAP 7 mmol/L      BUN 9 mg/dL      Creatinine 0 64 mg/dL      Glucose 91 mg/dL      Calcium 8 9 mg/dL      eGFR 83 ml/min/1 73sq m     Narrative:      [place] guidelines for Chronic Kidney Disease (CKD):   •  Stage 1 with normal or high GFR (GFR > 90 mL/min/1 73 square meters)  •  Stage 2 Mild CKD (GFR = 60-89 mL/min/1 73 square meters)  •  Stage 3A Moderate CKD (GFR = 45-59 mL/min/1 73 square meters)  •  Stage 3B Moderate CKD (GFR = 30-44 mL/min/1 73 square meters)  •  Stage 4 Severe CKD (GFR = 15-29 mL/min/1 73 square meters)  •  Stage 5 End Stage CKD (GFR <15 mL/min/1 73 square meters)  Note: GFR calculation is accurate only with a steady state creatinine    CK [[id number]]  (Normal) Collected: 03/17/23 0349    Lab Status: Final result Specimen: Blood from Arm, Right Updated: 03/17/23 0415     Total CK 77 U/L     C-reactive protein [[id number]]  (Abnormal) Collected: 03/17/23 0349    Lab Status: Final result Specimen: Blood from Arm, Right Updated: 03/17/23 0415     CRP 11 3 mg/L     CBC and differential [[id number]]  (Abnormal) Collected: 03/17/23 0349    Lab Status: Final result Specimen: Blood from Arm, Right Updated: 03/17/23 0403     WBC 8 94 Thousand/uL      RBC 3 50 Million/uL      Hemoglobin 10 6 g/dL      Hematocrit 32 2 %      MCV 92 fL      MCH 30 3 pg      MCHC 32 9 g/dL      RDW 13 7 %      MPV 9 8 fL      Platelets 819 Thousands/uL      nRBC 0 /100 WBCs      Neutrophils Relative 78 %      Immat GRANS % 0 %      Lymphocytes Relative 10 %      Monocytes Relative 9 %      Eosinophils Relative 2 %      Basophils Relative 1 %      Neutrophils Absolute 7 03 Thousands/µL      Immature Grans Absolute 0 03 Thousand/uL      Lymphocytes Absolute 0 88 Thousands/µL      Monocytes Absolute 0 77 Thousand/µL      Eosinophils Absolute 0 16 Thousand/µL      Basophils Absolute 0 07 Thousands/µL                  CT lower extremity w contrast right   Final Result by Ronda Santacruz MD (03/17 0663)      Mixed density area in the upper medial length measuring 7 0 x 2 8 x 7 2 cm suggests subacute hematoma given the history of trauma  No active bleeding seen  Workstation performed: [id number]                    Procedures  Procedures         ED Course  ED Course as of 03/17/23 0757   Fri Mar 17, 2023   0241 Procedure Note: EKG  Date/Time: 03/17/23 2:41 AM   Performed by: Lindy Shelton  Authorized by: Lindy Shelton  Indications / Diagnosis: Fall  ECG reviewed by me, the ED Provider: yes   The EKG demonstrates:  Rhythm: afib  Intervals: normal intervals  Axis: normal axis  QRS/Blocks: normal QRS  ST Changes: No acute ST Changes, no STD/RICHARD  No change from previous   0417 Hemoglobin(!): 10 6  Decreased from 1 month ago, down 2 points  7257 POCT INR(!!): 5 64   0720 Ace bandage placed around calf, resting comfortably, neurovascularly intact distally   0751 Patient with nonexpanding hematoma, no active extravasation on imaging, super subtherapeutic INR will give oral vitamin K, area compressed with ace bandage, patient will be discharged back to the facility with careful return precautions    No indication for admission at this time                                             Medical Decision Making  61-year-old female with right leg pain in setting of supratherapeutic INR after traumatic injury    Differential diagnosis includes contusion, hematoma, less likely compartment syndrome, cellulitis    Will obtain lab work and imaging will reevaluate    Amount and/or Complexity of Data Reviewed  Labs: ordered  Radiology: ordered  Risk  Prescription drug management  Disposition  Final diagnoses:   Right leg pain   Anemia   Supratherapeutic INR   Hematoma of leg, right, initial encounter     Time reflects when diagnosis was documented in both MDM as applicable and the Disposition within this note     Time User Action Codes Description Comment    3/17/2023  4:17 AM Lajean Nim Add [M79 604] Right leg pain     3/17/2023  4:17 AM Lajean Nim Add [D64 9] Anemia     3/17/2023  4:17 AM Lajean Nim Add [R79 1] Supratherapeutic INR     3/17/2023  4:17 AM Edgard Irby Gains  8XXA] Contusion     3/17/2023  7:54 AM Lajean Nim Remove [T14  8XXA] Contusion     3/17/2023  7:54 AM Lajean Nim Add [S80 11XA] Hematoma of leg, right, initial encounter       ED Disposition     ED Disposition   Discharge    Condition   Stable    Date/Time   Fri Mar 17, 2023  7:56 AM    Comment   Reyes Barclay DAYBREAK OF [place] discharge to home/self care  Follow-up Information     Follow up With Specialties Details Why Contact Info Additional Information     [address] Emergency Department Emergency Medicine  If symptoms worsen [address] [place] [address]  [address] [facility] Emergency Department, [address] [facility], [facility] [address]    Peter Robertson MD  Schedule an appointment as soon as possible for a visit   [facility] [address]  [phone]             Patient's Medications    No medications on file       No discharge procedures on file      PDMP Review     None          ED Provider  Electronically Signed by           Adriana Francois DO  03/17/23 1173

## 2023-03-17 NOTE — DISCHARGE INSTRUCTIONS
Please keep the area compressed, if you develop worsening pain, numbness or tingling of the foot please return to the emergency department      Your Coumadin level was elevated at you were given vitamin K to reverse the elevation, please hold the next dose of Coumadin, have your primary care provider recheck your Coumadin level and adjust accordingly    You had mild anemia at today's visit, there is no active bleeding into your calf however if you have worsening calf pain, lightheadedness chest pain or shortness of breath or any other signs of active bleeding please return to the emergency department have your primary care provider recheck your blood counts at a subsequent visit

## 2023-03-20 LAB
ATRIAL RATE: 110 BPM
QRS AXIS: 70 DEGREES
QRSD INTERVAL: 84 MS
QT INTERVAL: 368 MS
QTC INTERVAL: 421 MS
T WAVE AXIS: 69 DEGREES
VENTRICULAR RATE: 79 BPM

## 2023-03-23 ENCOUNTER — APPOINTMENT (OUTPATIENT)
Dept: LAB | Facility: HOSPITAL | Age: 83
End: 2023-03-23

## 2023-03-30 ENCOUNTER — APPOINTMENT (OUTPATIENT)
Dept: LAB | Facility: HOSPITAL | Age: 83
End: 2023-03-30

## 2023-04-04 ENCOUNTER — APPOINTMENT (OUTPATIENT)
Dept: LAB | Facility: HOSPITAL | Age: 83
End: 2023-04-04

## 2023-04-17 ENCOUNTER — APPOINTMENT (OUTPATIENT)
Dept: LAB | Facility: HOSPITAL | Age: 83
End: 2023-04-17

## 2023-04-17 DIAGNOSIS — I48.0 PAROXYSMAL A-FIB (HCC): ICD-10-CM

## 2023-04-20 ENCOUNTER — APPOINTMENT (OUTPATIENT)
Dept: LAB | Facility: HOSPITAL | Age: 83
End: 2023-04-20

## 2023-04-20 DIAGNOSIS — Z95.2 HEART VALVE REPLACED BY TRANSPLANT: ICD-10-CM

## 2023-04-20 LAB
INR PPP: 2.17 (ref 0.84–1.19)
PROTHROMBIN TIME: 24.5 SECONDS (ref 11.6–14.5)

## 2023-04-24 ENCOUNTER — OFFICE VISIT (OUTPATIENT)
Dept: WOUND CARE | Facility: HOSPITAL | Age: 83
End: 2023-04-24

## 2023-04-24 VITALS
TEMPERATURE: 98.4 F | SYSTOLIC BLOOD PRESSURE: 118 MMHG | DIASTOLIC BLOOD PRESSURE: 64 MMHG | RESPIRATION RATE: 18 BRPM | HEART RATE: 66 BPM

## 2023-04-24 DIAGNOSIS — S81.801A OPEN WOUND OF RIGHT LOWER LEG, INITIAL ENCOUNTER: Primary | ICD-10-CM

## 2023-04-24 DIAGNOSIS — R26.2 AMBULATORY DYSFUNCTION: ICD-10-CM

## 2023-04-24 RX ORDER — LIDOCAINE HYDROCHLORIDE 40 MG/ML
5 SOLUTION TOPICAL ONCE
Status: COMPLETED | OUTPATIENT
Start: 2023-04-24 | End: 2023-04-24

## 2023-04-24 RX ADMIN — LIDOCAINE HYDROCHLORIDE 5 ML: 40 SOLUTION TOPICAL at 08:43

## 2023-04-24 NOTE — PROGRESS NOTES
"Patient ID: Zenon Harrison is a 80 y o  female Date of Birth 1940     Chief Complaint  Chief Complaint   Patient presents with   • Follow Up Wound Care Visit     Wound care       Allergies  Erythromycin, Shellfish-derived products - food allergy, Ephedrine, Iodinated contrast media, Latex, Nsaids, Povidone iodine, Salicylates, Shrimp (diagnostic) - food allergy, Sympathomimetics, Tiotropium, and Tositumomab    Assessment:     Diagnoses and all orders for this visit:    Open wound of right lower leg, initial encounter  -     lidocaine (XYLOCAINE) 4 % topical solution 5 mL  -     Cancel: Wound cleansing and dressings; Future  -     Wound cleansing and dressings; Future  -     Debridement    Ambulatory dysfunction              Debridement   Wound 04/17/23 Traumatic Other (Comment) Leg Right;Medial;Lower    Universal Protocol:  Consent: Written consent obtained  Consent given by: patient  Time out: Immediately prior to procedure a \"time out\" was called to verify the correct patient, procedure, equipment, support staff and site/side marked as required  Timeout called at: 4/24/2023 9:01 AM   Patient identity confirmed: verbally with patient      Performed by: NP  Debridement type: selective  Pain control: lidocaine 4%  Pre-debridement measurements  Length (cm): 3 4  Width (cm): 3  Depth (cm): 1  Surface Area (cm^2): 10 2  Volume (cm^3): 10 2    Post-debridement measurements  Length (cm): 3 4  Width (cm): 3  Depth (cm): 1  Percent debrided: 100%  Surface Area (cm^2): 10 2  Area debrided (cm^2): 10 2  Volume (cm^3): 10 2  Devitalized tissue debrided: biofilm, fibrin and slough  Instrument(s) utilized: curette  Bleeding: small  Hemostasis obtained with: pressure  Procedural pain (0-10): 0  Post-procedural pain: 0   Response to treatment: procedure was tolerated well          Plan:  1  F/u visit  Wound debrided  Wound improving with increased granulation present in wound bed  Continue current plan of care   Patient " will follow up in 2 weeks  Wound 04/17/23 Traumatic Other (Comment) Leg Right;Medial;Lower (Active)   Wound Image Images linked 04/24/23 0838   Wound Description Slough;Granulation tissue;Pink 04/24/23 0838   Lorrie-wound Assessment Intact 04/24/23 0838   Wound Length (cm) 3 4 cm 04/24/23 0838   Wound Width (cm) 30 cm 04/24/23 0838   Wound Depth (cm) 1 cm 04/24/23 0838   Wound Surface Area (cm^2) 102 cm^2 04/24/23 0838   Wound Volume (cm^3) 102 cm^3 04/24/23 0838   Calculated Wound Volume (cm^3) 102 cm^3 04/24/23 0838   Change in Wound Size % -262 22 04/24/23 0838   Undermining 1 6 04/24/23 0838   Undermining is depth extending from 12-2 04/24/23 0838   Drainage Amount Moderate 04/24/23 0838   Drainage Description Serosanguineous 04/24/23 0838   Non-staged Wound Description Full thickness 04/24/23 0838   Treatments Cleansed 04/24/23 0838   Patient Tolerance Tolerated well 04/24/23 0838       Wound 04/17/23 Traumatic Other (Comment) Leg Right;Medial;Lower (Active)   Date First Assessed/Time First Assessed: 04/17/23 0834   Pre-Existing Wound: Yes  Primary Wound Type: Traumatic  Traumatic Wound Type: (c) Other (Comment)  Location: Leg  Wound Location Orientation: Right;Medial;Lower  Dressing Status: Intact; Old drai    Subjective:     F/u visit traumatic wound of RLE  No new complaints  She denies any pain, fevers, or chills  The following portions of the patient's history were reviewed and updated as appropriate:   She  has a past medical history of Anemia, Anxiety disorder, unspecified, Anxiety disorder, unspecified, Cardiac arrest, cause unspecified ([address] ), Chronic obstructive pulmonary disease, unspecified ([address] ), Constipation, Essential (primary) hypertension, GERD (gastroesophageal reflux disease), Hypo-osmolality and hyponatremia, Hypothyroidism, Nontraumatic hematoma of soft tissue, Other seizures ([address] ), Persistent atrial fibrillation ([address] ), and Unspecified urinary incontinence    She There are no problems to display for this patient  She  has a past surgical history that includes Femur fracture surgery; Appendectomy; and Cardiac pacemaker placement  Her family history is not on file  She  reports that she has never smoked  She has never used smokeless tobacco  She reports that she does not currently use alcohol  She reports that she does not use drugs  Current Outpatient Medications   Medication Sig Dispense Refill   • acetaminophen (TYLENOL) 325 mg tablet Take 650 mg by mouth every 8 (eight) hours as needed for mild pain     • acetaminophen (TYLENOL) 500 mg tablet Take 500 mg by mouth every 12 (twelve) hours as needed for mild pain     • albuterol (PROVENTIL HFA,VENTOLIN HFA) 90 mcg/act inhaler Inhale 2 puffs every 6 (six) hours as needed for wheezing     • ascorbic acid (VITAMIN C) 500 MG tablet Take 500 mg by mouth daily     • atorvastatin (LIPITOR) 80 mg tablet Take 80 mg by mouth daily     • calcium carbonate (OS-JERARDO) 600 MG tablet Take 600 mg by mouth daily     • cephalexin (KEFLEX) 500 mg capsule Take 500 mg by mouth every 6 (six) hours     • diltiazem (DILACOR XR) 180 MG 24 hr capsule Take 180 mg by mouth daily     • Fluticasone-Salmeterol (Advair) 100-50 mcg/dose inhaler Inhale 1 puff 2 (two) times a day Rinse mouth after use       • furosemide (LASIX) 40 mg tablet Take 40 mg by mouth daily     • lactase (LACTAID) 3,000 units tablet Take 9,000 Units by mouth 3 (three) times a day as needed     • lamoTRIgine (LaMICtal) 100 mg tablet Take 100 mg by mouth 2 (two) times a day     • levothyroxine 125 mcg tablet Take 125 mcg by mouth daily     • Lidocaine 4 % PTCH Apply 1 patch topically daily at bedtime On for 12 hrs, off for 12 hrs     • montelukast (SINGULAIR) 10 mg tablet Take 10 mg by mouth daily at bedtime     • Multiple Vitamin (multivitamin) tablet Take 1 tablet by mouth daily     • omeprazole (PriLOSEC) 20 mg delayed release capsule Take 20 mg by mouth daily     • oxybutynin (DITROPAN) 5 mg tablet Take 5 mg by mouth 3 (three) times a day     • senna (SENOKOT) 8 6 MG tablet Take 1 tablet by mouth daily as needed for constipation     • sertraline (ZOLOFT) 100 mg tablet Take 100 mg by mouth daily     • warfarin (COUMADIN) 2 5 mg tablet Take by mouth daily     • warfarin (COUMADIN) 2 5 mg tablet Take 1 25 mg by mouth 2 (two) times a week       No current facility-administered medications for this visit  She is allergic to erythromycin, shellfish-derived products - food allergy, ephedrine, iodinated contrast media, latex, nsaids, povidone iodine, salicylates, shrimp (diagnostic) - food allergy, sympathomimetics, tiotropium, and tositumomab       Review of Systems   Constitutional: Negative  HENT: Negative for ear pain and hearing loss  Eyes: Negative for pain  Respiratory: Negative for chest tightness and shortness of breath  Cardiovascular: Positive for leg swelling  Negative for chest pain and palpitations  Gastrointestinal: Negative for diarrhea, nausea and vomiting  Genitourinary: Negative for dysuria  Musculoskeletal: Positive for gait problem  Skin: Positive for wound  Neurological: Negative for tremors and weakness  Psychiatric/Behavioral: Negative for behavioral problems, confusion and suicidal ideas           Objective:       Wound 04/17/23 Traumatic Other (Comment) Leg Right;Medial;Lower (Active)   Wound Image Images linked 04/24/23 0838   Wound Description Slough;Granulation tissue;Pink 04/24/23 0838   Lorrie-wound Assessment Intact 04/24/23 0838   Wound Length (cm) 3 4 cm 04/24/23 0838   Wound Width (cm) 30 cm 04/24/23 0838   Wound Depth (cm) 1 cm 04/24/23 0838   Wound Surface Area (cm^2) 102 cm^2 04/24/23 0838   Wound Volume (cm^3) 102 cm^3 04/24/23 0838   Calculated Wound Volume (cm^3) 102 cm^3 04/24/23 0838   Change in Wound Size % -262 22 04/24/23 0838   Undermining 1 6 04/24/23 0838   Undermining is depth extending from 12-2 04/24/23 0838   Drainage Amount Moderate 04/24/23 0838   Drainage Description Serosanguineous 04/24/23 0838   Non-staged Wound Description Full thickness 04/24/23 0838   Treatments Cleansed 04/24/23 0838   Patient Tolerance Tolerated well 04/24/23 0838       /64   Pulse 66   Temp 98 4 °F (36 9 °C)   Resp 18             Wound Instructions:  Orders Placed This Encounter   Procedures   • Wound cleansing and dressings     Wound cleansing and dressings       Wash your hands with soap and water  Remove old dressing, discard into plastic bag and place in trash  Cleanse the wound with NSS prior to applying a clean dressing  Do not use tissue or cotton balls  Do not scrub the wound  Pat dry using gauze  Shower Yes but wrap dressing with plastic or saran wrap      Apply moisturizer to skin surrounding wound  Pack wound depth with mesalt strip or rope and apply mesalt to wound bed  Cover with ABD pad  Secure with dru  Change dressing EVERY OTHER DAY     Apply Tubigrip size E to right leg  Apply in the morning and may take off at bedtime       Follow-up in 2 week  Standing Status:   Future     Standing Expiration Date:   4/24/2024   • Debridement     This order was created via procedure documentation        Diagnosis ICD-10-CM Associated Orders   1  Open wound of right lower leg, initial encounter  S81 801A lidocaine (XYLOCAINE) 4 % topical solution 5 mL     Wound cleansing and dressings     Debridement      2   Ambulatory dysfunction  R26 2

## 2023-04-24 NOTE — PATIENT INSTRUCTIONS
Orders Placed This Encounter   Procedures    Wound cleansing and dressings     Wound cleansing and dressings       Wash your hands with soap and water  Remove old dressing, discard into plastic bag and place in trash  Cleanse the wound with NSS prior to applying a clean dressing  Do not use tissue or cotton balls  Do not scrub the wound  Pat dry using gauze  Shower Yes but wrap dressing with plastic or saran wrap  Apply moisturizer to skin surrounding wound  Pack wound depth with mesalt strip or rope and apply mesalt to wound bed  Cover with ABD pad  Secure with dru  Change dressing EVERY OTHER DAY     Apply Tubigrip size E to right leg  Apply in the morning and may take off at bedtime  Follow-up in 2 week       Standing Status:   Future     Standing Expiration Date:   4/24/2024

## 2023-04-24 NOTE — LETTER
[address] [address]  Phone#  [phone]  Fax#  [phone]    Patient:  Catherine Singletary  YOB: 1940  Phone:  [phone]  Date of Visit:  4/24/2023    Orders Placed This Encounter   Procedures   • Wound cleansing and dressings     Wound cleansing and dressings       Wash your hands with soap and water  Remove old dressing, discard into plastic bag and place in trash  Cleanse the wound with NSS prior to applying a clean dressing  Do not use tissue or cotton balls  Do not scrub the wound  Pat dry using gauze  Shower Yes but wrap dressing with plastic or saran wrap      Apply moisturizer to skin surrounding wound  Pack wound depth with mesalt strip or rope and apply mesalt to wound bed  Cover with ABD pad  Secure with dru  Change dressing EVERY OTHER DAY     Apply Tubigrip size E to right leg  Apply in the morning and may take off at bedtime       Follow-up in 2 week       Standing Status:   Future     Standing Expiration Date:   4/24/2024   • Debridement     This order was created via procedure documentation         Electronically signed by JEAN Watters

## 2023-04-27 ENCOUNTER — APPOINTMENT (OUTPATIENT)
Dept: LAB | Facility: HOSPITAL | Age: 83
End: 2023-04-27

## 2023-04-27 DIAGNOSIS — Z95.2 HEART VALVE REPLACED BY TRANSPLANT: ICD-10-CM

## 2023-04-27 LAB
INR PPP: 3.81 (ref 0.84–1.19)
PROTHROMBIN TIME: 37.8 SECONDS (ref 11.6–14.5)

## 2023-05-04 ENCOUNTER — APPOINTMENT (OUTPATIENT)
Dept: LAB | Facility: HOSPITAL | Age: 83
End: 2023-05-04

## 2023-05-04 DIAGNOSIS — Z95.2 HEART VALVE REPLACED BY TRANSPLANT: ICD-10-CM

## 2023-05-04 LAB
INR PPP: 4.42 (ref 0.84–1.19)
PROTHROMBIN TIME: 42.7 SECONDS (ref 11.6–14.5)

## 2023-05-08 ENCOUNTER — OFFICE VISIT (OUTPATIENT)
Dept: WOUND CARE | Facility: HOSPITAL | Age: 83
End: 2023-05-08

## 2023-05-08 VITALS
TEMPERATURE: 98.9 F | RESPIRATION RATE: 18 BRPM | HEART RATE: 96 BPM | SYSTOLIC BLOOD PRESSURE: 106 MMHG | DIASTOLIC BLOOD PRESSURE: 62 MMHG

## 2023-05-08 DIAGNOSIS — R26.2 AMBULATORY DYSFUNCTION: ICD-10-CM

## 2023-05-08 DIAGNOSIS — S81.801A OPEN WOUND OF RIGHT LOWER LEG, INITIAL ENCOUNTER: Primary | ICD-10-CM

## 2023-05-08 DIAGNOSIS — S81.801D OPEN WOUND OF RIGHT LOWER LEG, SUBSEQUENT ENCOUNTER: ICD-10-CM

## 2023-05-08 RX ORDER — LIDOCAINE HYDROCHLORIDE 40 MG/ML
5 SOLUTION TOPICAL ONCE
Status: COMPLETED | OUTPATIENT
Start: 2023-05-08 | End: 2023-05-08

## 2023-05-08 RX ADMIN — LIDOCAINE HYDROCHLORIDE 5 ML: 40 SOLUTION TOPICAL at 08:25

## 2023-05-08 NOTE — LETTER
[address] [address]  Phone#  [phone]  Fax#  [phone]    Patient:  Jose Whitaker  YOB: 1940  Phone:  [phone]  Date of Visit:  5/8/2023    Orders Placed This Encounter   Procedures   • Wound cleansing and dressings     Wash your hands with soap and water  Remove old dressing, discard into plastic bag and place in trash  Cleanse the wound with NSS prior to applying a clean dressing  Do not use tissue or cotton balls  Do not scrub the wound  Pat dry using gauze  Shower Yes but wrap dressing with plastic or saran wrap      Apply moisturizer to skin surrounding wound  Apply Dermagran to the wound bed  Cover with ABD pad  Secure with dru  Change dressing EVERY OTHER DAY     Apply Tubigrip size E to right leg  Apply in the morning and may take off at bedtime       Follow-up in 2 weeks       Standing Status:   Future     Standing Expiration Date:   5/8/2024         Electronically signed by JEAN Vazquez

## 2023-05-08 NOTE — PATIENT INSTRUCTIONS
Orders Placed This Encounter   Procedures    Wound cleansing and dressings     Wash your hands with soap and water  Remove old dressing, discard into plastic bag and place in trash  Cleanse the wound with NSS prior to applying a clean dressing  Do not use tissue or cotton balls  Do not scrub the wound  Pat dry using gauze  Shower Yes but wrap dressing with plastic or saran wrap  Apply moisturizer to skin surrounding wound  Apply Dermagran to the wound bed  Cover with ABD pad  Secure with dru  Change dressing EVERY OTHER DAY     Apply Tubigrip size E to right leg  Apply in the morning and may take off at bedtime  Follow-up in 2 weeks       Standing Status:   Future     Standing Expiration Date:   5/8/2024

## 2023-05-08 NOTE — PROGRESS NOTES
"Patient ID: Tiffanie Tay is a 80 y o  female Date of Birth 1940     Chief Complaint  Chief Complaint   Patient presents with   • Follow Up Wound Care Visit     Right leg wound       Allergies  Erythromycin, Shellfish-derived products - food allergy, Ephedrine, Iodinated contrast media, Latex, Nsaids, Povidone iodine, Salicylates, Shrimp (diagnostic) - food allergy, Sympathomimetics, Tiotropium, and Tositumomab    Assessment:     Diagnoses and all orders for this visit:    Open wound of right lower leg, initial encounter  -     Debridement    Ambulatory dysfunction    Open wound of right lower leg, subsequent encounter  -     lidocaine (XYLOCAINE) 4 % topical solution 5 mL  -     Wound cleansing and dressings; Future              Debridement   Wound 04/17/23 Traumatic Other (Comment) Leg Right;Medial;Lower    Universal Protocol:  Consent: Written consent obtained  Consent given by: patient  Time out: Immediately prior to procedure a \"time out\" was called to verify the correct patient, procedure, equipment, support staff and site/side marked as required  Timeout called at: 5/8/2023 8:49 AM   Patient identity confirmed: verbally with patient      Performed by: NP  Debridement type: selective  Pain control: lidocaine 4%  Pre-debridement measurements  Length (cm): 2 4  Width (cm): 2  Depth (cm): 0 2  Surface Area (cm^2): 4 8  Volume (cm^3): 0 96    Post-debridement measurements  Length (cm): 2 4  Width (cm): 2  Depth (cm): 0 2  Percent debrided: 100%  Surface Area (cm^2): 4 8  Area debrided (cm^2): 4 8  Volume (cm^3): 0 96  Devitalized tissue debrided: biofilm, fibrin and slough  Instrument(s) utilized: curette  Bleeding: small  Hemostasis obtained with: pressure  Procedural pain (0-10): 0  Post-procedural pain: 0   Response to treatment: procedure was tolerated well          Plan:  1  F/U visit  Wound debrided  Wound improving and measuring smaller with a clean granular wound bed    We will change treatment " to Dermagran gauze changed every other day  Continue Spendagrip E for gentle compression therapy  Patient will follow-up in 2 weeks  Wound 04/17/23 Traumatic Other (Comment) Leg Right;Medial;Lower (Active)   Wound Image Images linked 05/08/23 0818   Wound Description Beefy red;Granulation tissue; Yellow 05/08/23 0815   Lorrie-wound Assessment Dry 05/08/23 0815   Wound Length (cm) 2 4 cm 05/08/23 0815   Wound Width (cm) 2 cm 05/08/23 0815   Wound Depth (cm) 0 2 cm 05/08/23 0815   Wound Surface Area (cm^2) 4 8 cm^2 05/08/23 0815   Wound Volume (cm^3) 0 96 cm^3 05/08/23 0815   Calculated Wound Volume (cm^3) 0 96 cm^3 05/08/23 0815   Change in Wound Size % 96 59 05/08/23 0815   Drainage Amount Small 05/08/23 0815   Drainage Description Serosanguineous 05/08/23 0815   Non-staged Wound Description Full thickness 05/08/23 0815   Dressing Status Intact; Old drainage 05/08/23 0815       Wound 04/17/23 Traumatic Other (Comment) Leg Right;Medial;Lower (Active)   Date First Assessed/Time First Assessed: 04/17/23 0834   Pre-Existing Wound: Yes  Primary Wound Type: Traumatic  Traumatic Wound Type: (c) Other (Comment)  Location: Leg  Wound Location Orientation: Right;Medial;Lower  Dressing Status: Intact; Old drai    Subjective:     F/u visit for traumatic wound of right lower leg  No new complaints  She denies any pain, fevers, or chills  The following portions of the patient's history were reviewed and updated as appropriate:   She  has a past medical history of Anemia, Anxiety disorder, unspecified, Anxiety disorder, unspecified, Cardiac arrest, cause unspecified ([address] ), Chronic obstructive pulmonary disease, unspecified ([address] ), Constipation, Essential (primary) hypertension, GERD (gastroesophageal reflux disease), Hypo-osmolality and hyponatremia, Hypothyroidism, Nontraumatic hematoma of soft tissue, Other seizures ([address] ), Persistent atrial fibrillation ([address] ), and Unspecified urinary incontinence    She There are no problems to display for this patient  She  has a past surgical history that includes Femur fracture surgery; Appendectomy; and Cardiac pacemaker placement  Her family history is not on file  She  reports that she has never smoked  She has never used smokeless tobacco  She reports that she does not currently use alcohol  She reports that she does not use drugs  Current Outpatient Medications   Medication Sig Dispense Refill   • acetaminophen (TYLENOL) 325 mg tablet Take 650 mg by mouth every 8 (eight) hours as needed for mild pain     • acetaminophen (TYLENOL) 500 mg tablet Take 500 mg by mouth every 12 (twelve) hours as needed for mild pain     • albuterol (PROVENTIL HFA,VENTOLIN HFA) 90 mcg/act inhaler Inhale 2 puffs every 6 (six) hours as needed for wheezing     • ascorbic acid (VITAMIN C) 500 MG tablet Take 500 mg by mouth daily     • atorvastatin (LIPITOR) 80 mg tablet Take 80 mg by mouth daily     • calcium carbonate (OS-JERARDO) 600 MG tablet Take 600 mg by mouth daily     • cephalexin (KEFLEX) 500 mg capsule Take 500 mg by mouth every 6 (six) hours     • diltiazem (DILACOR XR) 180 MG 24 hr capsule Take 180 mg by mouth daily     • Fluticasone-Salmeterol (Advair) 100-50 mcg/dose inhaler Inhale 1 puff 2 (two) times a day Rinse mouth after use       • furosemide (LASIX) 40 mg tablet Take 40 mg by mouth daily     • lactase (LACTAID) 3,000 units tablet Take 9,000 Units by mouth 3 (three) times a day as needed     • lamoTRIgine (LaMICtal) 100 mg tablet Take 100 mg by mouth 2 (two) times a day     • levothyroxine 125 mcg tablet Take 125 mcg by mouth daily     • Lidocaine 4 % PTCH Apply 1 patch topically daily at bedtime On for 12 hrs, off for 12 hrs     • montelukast (SINGULAIR) 10 mg tablet Take 10 mg by mouth daily at bedtime     • Multiple Vitamin (multivitamin) tablet Take 1 tablet by mouth daily     • omeprazole (PriLOSEC) 20 mg delayed release capsule Take 20 mg by mouth daily     • oxybutynin (DITROPAN) 5 mg tablet Take 5 mg by mouth 3 (three) times a day     • senna (SENOKOT) 8 6 MG tablet Take 1 tablet by mouth daily as needed for constipation     • sertraline (ZOLOFT) 100 mg tablet Take 100 mg by mouth daily     • warfarin (COUMADIN) 2 5 mg tablet Take by mouth daily     • warfarin (COUMADIN) 2 5 mg tablet Take 1 25 mg by mouth 2 (two) times a week       No current facility-administered medications for this visit  She is allergic to erythromycin, shellfish-derived products - food allergy, ephedrine, iodinated contrast media, latex, nsaids, povidone iodine, salicylates, shrimp (diagnostic) - food allergy, sympathomimetics, tiotropium, and tositumomab       Review of Systems   Constitutional: Negative  HENT: Negative for ear pain and hearing loss  Eyes: Negative for pain  Respiratory: Negative for chest tightness and shortness of breath  Cardiovascular: Positive for leg swelling  Negative for chest pain and palpitations  Gastrointestinal: Negative for diarrhea, nausea and vomiting  Genitourinary: Negative for dysuria  Musculoskeletal: Positive for gait problem  Skin: Positive for wound  Neurological: Negative for tremors and weakness  Psychiatric/Behavioral: Negative for behavioral problems, confusion and suicidal ideas  Objective:       Wound 04/17/23 Traumatic Other (Comment) Leg Right;Medial;Lower (Active)   Wound Image Images linked 05/08/23 0818   Wound Description Beefy red;Granulation tissue; Yellow 05/08/23 0815   Lorrie-wound Assessment Dry 05/08/23 0815   Wound Length (cm) 2 4 cm 05/08/23 0815   Wound Width (cm) 2 cm 05/08/23 0815   Wound Depth (cm) 0 2 cm 05/08/23 0815   Wound Surface Area (cm^2) 4 8 cm^2 05/08/23 0815   Wound Volume (cm^3) 0 96 cm^3 05/08/23 0815   Calculated Wound Volume (cm^3) 0 96 cm^3 05/08/23 0815   Change in Wound Size % 96 59 05/08/23 0815   Drainage Amount Small 05/08/23 0815   Drainage Description Serosanguineous 05/08/23 0815   Non-staged Wound Description Full thickness 05/08/23 0815   Dressing Status Intact; Old drainage 05/08/23 0815       /62   Pulse 96   Temp 98 9 °F (37 2 °C)   Resp 18             Wound Instructions:  Orders Placed This Encounter   Procedures   • Wound cleansing and dressings     Wash your hands with soap and water  Remove old dressing, discard into plastic bag and place in trash  Cleanse the wound with NSS prior to applying a clean dressing  Do not use tissue or cotton balls  Do not scrub the wound  Pat dry using gauze  Shower Yes but wrap dressing with plastic or saran wrap      Apply moisturizer to skin surrounding wound  Apply Dermagran to the wound bed  Cover with ABD pad  Secure with dru  Change dressing EVERY OTHER DAY     Apply Tubigrip size E to right leg  Apply in the morning and may take off at bedtime       Follow-up in 2 weeks  Standing Status:   Future     Standing Expiration Date:   5/8/2024   • Debridement     This order was created via procedure documentation        Diagnosis ICD-10-CM Associated Orders   1  Open wound of right lower leg, initial encounter  S81 801A Debridement      2  Ambulatory dysfunction  R26 2       3   Open wound of right lower leg, subsequent encounter  S81 801D lidocaine (XYLOCAINE) 4 % topical solution 5 mL     Wound cleansing and dressings

## 2023-05-11 ENCOUNTER — APPOINTMENT (OUTPATIENT)
Dept: LAB | Facility: HOSPITAL | Age: 83
End: 2023-05-11

## 2023-05-11 DIAGNOSIS — Z95.2 HEART VALVE REPLACED BY TRANSPLANT: ICD-10-CM

## 2023-05-11 LAB
INR PPP: 4.01 (ref 0.84–1.19)
PROTHROMBIN TIME: 39.3 SECONDS (ref 11.6–14.5)

## 2023-05-18 ENCOUNTER — APPOINTMENT (OUTPATIENT)
Dept: LAB | Facility: HOSPITAL | Age: 83
End: 2023-05-18

## 2023-05-18 ENCOUNTER — TRANSCRIBE ORDERS (OUTPATIENT)
Dept: LAB | Facility: HOSPITAL | Age: 83
End: 2023-05-18

## 2023-05-18 DIAGNOSIS — Z95.2 HEART VALVE REPLACED BY TRANSPLANT: ICD-10-CM

## 2023-05-18 DIAGNOSIS — Z95.2 HEART VALVE REPLACED BY TRANSPLANT: Primary | ICD-10-CM

## 2023-05-18 LAB
INR PPP: 2.19 (ref 0.84–1.19)
PROTHROMBIN TIME: 24.6 SECONDS (ref 11.6–14.5)

## 2023-05-25 ENCOUNTER — APPOINTMENT (OUTPATIENT)
Dept: LAB | Facility: HOSPITAL | Age: 83
End: 2023-05-25

## 2023-05-25 DIAGNOSIS — Z95.2 HEART VALVE REPLACED BY TRANSPLANT: ICD-10-CM

## 2023-05-25 LAB
INR PPP: 2.56 (ref 0.84–1.19)
PROTHROMBIN TIME: 27.7 SECONDS (ref 11.6–14.5)

## 2023-06-01 ENCOUNTER — TRANSCRIBE ORDERS (OUTPATIENT)
Dept: LAB | Facility: HOSPITAL | Age: 83
End: 2023-06-01

## 2023-06-01 ENCOUNTER — APPOINTMENT (OUTPATIENT)
Dept: LAB | Facility: HOSPITAL | Age: 83
End: 2023-06-01
Payer: COMMERCIAL

## 2023-06-01 ENCOUNTER — OFFICE VISIT (OUTPATIENT)
Dept: WOUND CARE | Facility: HOSPITAL | Age: 83
End: 2023-06-01

## 2023-06-01 VITALS
HEART RATE: 72 BPM | TEMPERATURE: 97.7 F | DIASTOLIC BLOOD PRESSURE: 70 MMHG | RESPIRATION RATE: 16 BRPM | SYSTOLIC BLOOD PRESSURE: 102 MMHG

## 2023-06-01 DIAGNOSIS — S81.801A OPEN WOUND OF RIGHT LOWER LEG, INITIAL ENCOUNTER: Primary | ICD-10-CM

## 2023-06-01 DIAGNOSIS — Z95.2 HEART VALVE REPLACED BY TRANSPLANT: ICD-10-CM

## 2023-06-01 DIAGNOSIS — Z95.2 HEART VALVE REPLACED BY TRANSPLANT: Primary | ICD-10-CM

## 2023-06-01 DIAGNOSIS — R26.2 AMBULATORY DYSFUNCTION: ICD-10-CM

## 2023-06-01 LAB
INR PPP: 3.15 (ref 0.84–1.19)
PROTHROMBIN TIME: 32.6 SECONDS (ref 11.6–14.5)

## 2023-06-01 PROCEDURE — 36415 COLL VENOUS BLD VENIPUNCTURE: CPT

## 2023-06-01 PROCEDURE — 85610 PROTHROMBIN TIME: CPT

## 2023-06-01 NOTE — PROGRESS NOTES
Patient ID: Steve Matthew is a 80 y o  female Date of Birth 1940     Chief Complaint  Chief Complaint   Patient presents with   • Follow Up Wound Care Visit     Dressing intact on right lower leg, denies any problems  Allergies  Erythromycin, Shellfish-derived products - food allergy, Ephedrine, Iodinated contrast media, Latex, Nsaids, Povidone iodine, Salicylates, Shrimp (diagnostic) - food allergy, Sympathomimetics, Tiotropium, and Tositumomab    Assessment:     Diagnoses and all orders for this visit:    Open wound of right lower leg, initial encounter  -     Wound cleansing and dressings; Future    Ambulatory dysfunction          Plan:  1  F/u visit  Wound epithelialized  Counseled patient to moisturize skin daily with lotion and to wear compression stockings (10-15mmHg) daily  Patient verbalized agreement and understanding  Patient is discharged from the wound center today  She may follow up PRN  Wound 04/17/23 Traumatic Other (Comment) Leg Right;Medial;Lower (Active)   Wound Image Images linked 06/01/23 1108   Wound Description Epithelialization 06/01/23 1107   Lorrie-wound Assessment Intact 06/01/23 1107   Wound Length (cm) 0 cm 06/01/23 1107   Wound Width (cm) 0 cm 06/01/23 1107   Wound Depth (cm) 0 cm 06/01/23 1107   Wound Surface Area (cm^2) 0 cm^2 06/01/23 1107   Wound Volume (cm^3) 0 cm^3 06/01/23 1107   Calculated Wound Volume (cm^3) 0 cm^3 06/01/23 1107   Change in Wound Size % 100 06/01/23 1107   Drainage Amount None 06/01/23 1107   Non-staged Wound Description Not applicable 97/24/04 3112   Dressing Status Intact 06/01/23 1107       Wound 04/17/23 Traumatic Other (Comment) Leg Right;Medial;Lower (Active)   Date First Assessed/Time First Assessed: 04/17/23 0834   Pre-Existing Wound: Yes  Primary Wound Type: Traumatic  Traumatic Wound Type: (c) Other (Comment)  Location: Leg  Wound Location Orientation: Right;Medial;Lower  Dressing Status: Intact; Old drai           Subjective:      F/u visit traumatic wound of RLE  No new complaints  She denies any pain, fevers, or chills  The following portions of the patient's history were reviewed and updated as appropriate:   She  has a past medical history of Anemia, Anxiety disorder, unspecified, Anxiety disorder, unspecified, Cardiac arrest, cause unspecified ([facility] [address] ), Chronic obstructive pulmonary disease, unspecified ([facility] [address] ), Constipation, Essential (primary) hypertension, GERD (gastroesophageal reflux disease), Hypo-osmolality and hyponatremia, Hypothyroidism, Nontraumatic hematoma of soft tissue, Other seizures ([facility] [address] ), Persistent atrial fibrillation ([facility] [address] ), and Unspecified urinary incontinence  She There are no problems to display for this patient  She  has a past surgical history that includes Femur fracture surgery; Appendectomy; and Cardiac pacemaker placement  Her family history is not on file  She  reports that she has never smoked  She has never used smokeless tobacco  She reports that she does not currently use alcohol  She reports that she does not use drugs    Current Outpatient Medications   Medication Sig Dispense Refill   • acetaminophen (TYLENOL) 325 mg tablet Take 650 mg by mouth every 8 (eight) hours as needed for mild pain     • acetaminophen (TYLENOL) 500 mg tablet Take 500 mg by mouth every 12 (twelve) hours as needed for mild pain     • albuterol (PROVENTIL HFA,VENTOLIN HFA) 90 mcg/act inhaler Inhale 2 puffs every 6 (six) hours as needed for wheezing     • ascorbic acid (VITAMIN C) 500 MG tablet Take 500 mg by mouth daily     • atorvastatin (LIPITOR) 80 mg tablet Take 80 mg by mouth daily     • calcium carbonate (OS-JERARDO) 600 MG tablet Take 600 mg by mouth daily     • cephalexin (KEFLEX) 500 mg capsule Take 500 mg by mouth every 6 (six) hours     • diltiazem (DILACOR XR) 180 MG 24 hr capsule Take 180 mg by mouth daily     • Fluticasone-Salmeterol (Advair) 100-50 mcg/dose inhaler Inhale 1 puff 2 (two) times a day Rinse mouth after use  • furosemide (LASIX) 40 mg tablet Take 40 mg by mouth daily     • lactase (LACTAID) 3,000 units tablet Take 9,000 Units by mouth 3 (three) times a day as needed     • lamoTRIgine (LaMICtal) 100 mg tablet Take 100 mg by mouth 2 (two) times a day     • levothyroxine 125 mcg tablet Take 125 mcg by mouth daily     • Lidocaine 4 % PTCH Apply 1 patch topically daily at bedtime On for 12 hrs, off for 12 hrs     • montelukast (SINGULAIR) 10 mg tablet Take 10 mg by mouth daily at bedtime     • Multiple Vitamin (multivitamin) tablet Take 1 tablet by mouth daily     • omeprazole (PriLOSEC) 20 mg delayed release capsule Take 20 mg by mouth daily     • oxybutynin (DITROPAN) 5 mg tablet Take 5 mg by mouth 3 (three) times a day     • senna (SENOKOT) 8 6 MG tablet Take 1 tablet by mouth daily as needed for constipation     • sertraline (ZOLOFT) 100 mg tablet Take 100 mg by mouth daily     • warfarin (COUMADIN) 2 5 mg tablet Take by mouth daily     • warfarin (COUMADIN) 2 5 mg tablet Take 1 25 mg by mouth 2 (two) times a week       No current facility-administered medications for this visit  She is allergic to erythromycin, shellfish-derived products - food allergy, ephedrine, iodinated contrast media, latex, nsaids, povidone iodine, salicylates, shrimp (diagnostic) - food allergy, sympathomimetics, tiotropium, and tositumomab       Review of Systems   Constitutional: Negative  HENT: Negative for ear pain and hearing loss  Eyes: Negative for pain  Respiratory: Negative for chest tightness and shortness of breath  Cardiovascular: Positive for leg swelling  Negative for chest pain and palpitations  Gastrointestinal: Negative for diarrhea, nausea and vomiting  Genitourinary: Negative for dysuria  Musculoskeletal: Positive for gait problem  Skin: Positive for wound  Neurological: Negative for tremors and weakness     Psychiatric/Behavioral: Negative for behavioral problems, confusion and suicidal ideas  Objective:       Wound 04/17/23 Traumatic Other (Comment) Leg Right;Medial;Lower (Active)   Wound Image Images linked 06/01/23 1108   Wound Description Epithelialization 06/01/23 1107   Lorrie-wound Assessment Intact 06/01/23 1107   Wound Length (cm) 0 cm 06/01/23 1107   Wound Width (cm) 0 cm 06/01/23 1107   Wound Depth (cm) 0 cm 06/01/23 1107   Wound Surface Area (cm^2) 0 cm^2 06/01/23 1107   Wound Volume (cm^3) 0 cm^3 06/01/23 1107   Calculated Wound Volume (cm^3) 0 cm^3 06/01/23 1107   Change in Wound Size % 100 06/01/23 1107   Drainage Amount None 06/01/23 1107   Non-staged Wound Description Not applicable 50/35/93 5515   Dressing Status Intact 06/01/23 1107       /70   Pulse 72   Temp 97 7 °F (36 5 °C)   Resp 16     Physical Exam  Vitals and nursing note reviewed  Constitutional:       General: She is not in acute distress  Appearance: Normal appearance  She is normal weight  HENT:      Head: Normocephalic and atraumatic  Eyes:      General:         Right eye: No discharge  Left eye: No discharge  Pulmonary:      Effort: Pulmonary effort is normal  No respiratory distress  Musculoskeletal:         General: Normal range of motion  Cervical back: Normal range of motion and neck supple  No rigidity  Right lower leg: No edema  Left lower leg: No edema  Skin:     General: Skin is warm  Findings: No erythema or wound  Neurological:      General: No focal deficit present  Mental Status: She is alert and oriented to person, place, and time  Mental status is at baseline  Psychiatric:         Mood and Affect: Mood normal          Behavior: Behavior normal          Thought Content: Thought content normal                    Wound Instructions:  Orders Placed This Encounter   Procedures   • Wound cleansing and dressings     Right lower leg  Healed today  Moisturize twice daily    May purchase compression stockings 15-20 mmhg as tolerated  Wear spandagrips or compression stockings as tolerated  Discharged today     Standing Status:   Future     Standing Expiration Date:   6/1/2024        Diagnosis ICD-10-CM Associated Orders   1  Open wound of right lower leg, initial encounter  S81 801A Wound cleansing and dressings      2   Ambulatory dysfunction  R26 2

## 2023-06-01 NOTE — PATIENT INSTRUCTIONS
Orders Placed This Encounter   Procedures    Wound cleansing and dressings     Right lower leg  Healed today  Moisturize twice daily  May purchase compression stockings 15-20 mmhg as tolerated  Wear spandagrips or compression stockings as tolerated    Discharged today     Standing Status:   Future     Standing Expiration Date:   6/1/2024

## 2023-06-08 ENCOUNTER — APPOINTMENT (OUTPATIENT)
Dept: LAB | Facility: HOSPITAL | Age: 83
End: 2023-06-08
Payer: COMMERCIAL

## 2023-06-08 DIAGNOSIS — Z95.2 HEART VALVE REPLACED BY TRANSPLANT: ICD-10-CM

## 2023-06-08 LAB
INR PPP: 4.29 (ref 0.84–1.19)
PROTHROMBIN TIME: 41.5 SECONDS (ref 11.6–14.5)

## 2023-06-08 PROCEDURE — 36415 COLL VENOUS BLD VENIPUNCTURE: CPT

## 2023-06-08 PROCEDURE — 85610 PROTHROMBIN TIME: CPT

## 2023-06-15 ENCOUNTER — APPOINTMENT (OUTPATIENT)
Dept: LAB | Facility: HOSPITAL | Age: 83
End: 2023-06-15
Payer: COMMERCIAL

## 2023-06-19 ENCOUNTER — TRANSCRIBE ORDERS (OUTPATIENT)
Dept: ADMINISTRATIVE | Facility: HOSPITAL | Age: 83
End: 2023-06-19

## 2023-06-19 ENCOUNTER — APPOINTMENT (OUTPATIENT)
Dept: LAB | Facility: HOSPITAL | Age: 83
End: 2023-06-19
Payer: COMMERCIAL

## 2023-06-19 DIAGNOSIS — Z95.2 PULMONARY VALVE REPLACED: Primary | ICD-10-CM

## 2023-06-19 DIAGNOSIS — Z95.2 PULMONARY VALVE REPLACED: ICD-10-CM

## 2023-06-19 LAB
INR PPP: 3.28 (ref 0.84–1.19)
PROTHROMBIN TIME: 33.7 SECONDS (ref 11.6–14.5)

## 2023-06-19 PROCEDURE — 36415 COLL VENOUS BLD VENIPUNCTURE: CPT

## 2023-06-19 PROCEDURE — 85610 PROTHROMBIN TIME: CPT

## 2023-06-22 ENCOUNTER — APPOINTMENT (OUTPATIENT)
Dept: LAB | Facility: HOSPITAL | Age: 83
End: 2023-06-22
Payer: COMMERCIAL

## 2023-06-22 DIAGNOSIS — Z95.2 PULMONARY VALVE REPLACED: ICD-10-CM

## 2023-06-22 LAB
INR PPP: 3.6 (ref 0.84–1.19)
PROTHROMBIN TIME: 36.2 SECONDS (ref 11.6–14.5)

## 2023-06-22 PROCEDURE — 36415 COLL VENOUS BLD VENIPUNCTURE: CPT

## 2023-06-22 PROCEDURE — 85610 PROTHROMBIN TIME: CPT

## 2023-06-29 ENCOUNTER — TRANSCRIBE ORDERS (OUTPATIENT)
Dept: LAB | Facility: HOSPITAL | Age: 83
End: 2023-06-29

## 2023-06-29 ENCOUNTER — APPOINTMENT (OUTPATIENT)
Dept: LAB | Facility: HOSPITAL | Age: 83
End: 2023-06-29
Payer: COMMERCIAL

## 2023-06-29 DIAGNOSIS — Z95.2 HEART VALVE REPLACED BY TRANSPLANT: Primary | ICD-10-CM

## 2023-06-29 DIAGNOSIS — Z95.2 HEART VALVE REPLACED BY TRANSPLANT: ICD-10-CM

## 2023-06-29 LAB
INR PPP: 2.44 (ref 0.84–1.19)
PROTHROMBIN TIME: 26.7 SECONDS (ref 11.6–14.5)

## 2023-06-29 PROCEDURE — 36415 COLL VENOUS BLD VENIPUNCTURE: CPT

## 2023-06-29 PROCEDURE — 85610 PROTHROMBIN TIME: CPT

## 2023-07-06 ENCOUNTER — TRANSCRIBE ORDERS (OUTPATIENT)
Dept: LAB | Facility: HOSPITAL | Age: 83
End: 2023-07-06

## 2023-07-06 ENCOUNTER — APPOINTMENT (OUTPATIENT)
Dept: LAB | Facility: HOSPITAL | Age: 83
End: 2023-07-06
Payer: COMMERCIAL

## 2023-07-06 DIAGNOSIS — Z95.2 PRESENCE OF PROSTHETIC HEART VALVE: ICD-10-CM

## 2023-07-06 DIAGNOSIS — Z95.2 PRESENCE OF PROSTHETIC HEART VALVE: Primary | ICD-10-CM

## 2023-07-06 LAB
INR PPP: 2.65 (ref 0.84–1.19)
PROTHROMBIN TIME: 28.6 SECONDS (ref 11.6–14.5)

## 2023-07-06 PROCEDURE — 36415 COLL VENOUS BLD VENIPUNCTURE: CPT

## 2023-07-06 PROCEDURE — 85610 PROTHROMBIN TIME: CPT

## 2023-07-12 ENCOUNTER — TRANSCRIBE ORDERS (OUTPATIENT)
Dept: LAB | Facility: HOSPITAL | Age: 83
End: 2023-07-12

## 2023-07-12 DIAGNOSIS — I48.0 PAROXYSMAL ATRIAL FIBRILLATION (HCC): Primary | ICD-10-CM

## 2023-07-13 ENCOUNTER — APPOINTMENT (OUTPATIENT)
Dept: LAB | Facility: HOSPITAL | Age: 83
End: 2023-07-13
Payer: COMMERCIAL

## 2023-07-13 DIAGNOSIS — I48.0 PAROXYSMAL ATRIAL FIBRILLATION (HCC): ICD-10-CM

## 2023-07-13 LAB
INR PPP: 2.76 (ref 0.84–1.19)
PROTHROMBIN TIME: 29.4 SECONDS (ref 11.6–14.5)

## 2023-07-13 PROCEDURE — 85610 PROTHROMBIN TIME: CPT

## 2023-07-13 PROCEDURE — 36415 COLL VENOUS BLD VENIPUNCTURE: CPT

## 2023-07-20 ENCOUNTER — TRANSCRIBE ORDERS (OUTPATIENT)
Dept: LAB | Facility: HOSPITAL | Age: 83
End: 2023-07-20

## 2023-07-20 ENCOUNTER — APPOINTMENT (OUTPATIENT)
Dept: LAB | Facility: HOSPITAL | Age: 83
End: 2023-07-20
Payer: COMMERCIAL

## 2023-07-20 ENCOUNTER — LAB REQUISITION (OUTPATIENT)
Dept: LAB | Facility: HOSPITAL | Age: 83
End: 2023-07-20
Payer: COMMERCIAL

## 2023-07-20 DIAGNOSIS — I48.0 PAROXYSMAL ATRIAL FIBRILLATION (HCC): Primary | ICD-10-CM

## 2023-07-20 DIAGNOSIS — I48.0 PAROXYSMAL ATRIAL FIBRILLATION (HCC): ICD-10-CM

## 2023-07-20 DIAGNOSIS — R35.0 FREQUENCY OF MICTURITION: ICD-10-CM

## 2023-07-20 DIAGNOSIS — R35.1 NOCTURIA: ICD-10-CM

## 2023-07-20 LAB
BACTERIA UR QL AUTO: ABNORMAL /HPF
BILIRUB UR QL STRIP: NEGATIVE
BILIRUB UR QL STRIP: NEGATIVE
CLARITY UR: ABNORMAL
CLARITY UR: ABNORMAL
COLOR UR: YELLOW
COLOR UR: YELLOW
GLUCOSE UR STRIP-MCNC: NEGATIVE MG/DL
GLUCOSE UR STRIP-MCNC: NEGATIVE MG/DL
HGB UR QL STRIP.AUTO: NEGATIVE
HGB UR QL STRIP.AUTO: NEGATIVE
HYALINE CASTS #/AREA URNS LPF: ABNORMAL /LPF
INR PPP: 2.27 (ref 0.84–1.19)
KETONES UR STRIP-MCNC: NEGATIVE MG/DL
KETONES UR STRIP-MCNC: NEGATIVE MG/DL
LEUKOCYTE ESTERASE UR QL STRIP: ABNORMAL
LEUKOCYTE ESTERASE UR QL STRIP: ABNORMAL
NITRITE UR QL STRIP: NEGATIVE
NITRITE UR QL STRIP: NEGATIVE
NON-SQ EPI CELLS URNS QL MICRO: ABNORMAL /HPF
PH UR STRIP.AUTO: 6.5 [PH]
PH UR STRIP.AUTO: 6.5 [PH]
PROT UR STRIP-MCNC: ABNORMAL MG/DL
PROT UR STRIP-MCNC: ABNORMAL MG/DL
PROTHROMBIN TIME: 25.3 SECONDS (ref 11.6–14.5)
RBC #/AREA URNS AUTO: ABNORMAL /HPF
SP GR UR STRIP.AUTO: 1.01 (ref 1–1.03)
SP GR UR STRIP.AUTO: 1.01 (ref 1–1.03)
TRANS CELLS #/AREA URNS HPF: PRESENT /[HPF]
UROBILINOGEN UR STRIP-ACNC: <2 MG/DL
UROBILINOGEN UR STRIP-ACNC: <2 MG/DL
WBC #/AREA URNS AUTO: ABNORMAL /HPF

## 2023-07-20 PROCEDURE — 81001 URINALYSIS AUTO W/SCOPE: CPT | Performed by: FAMILY MEDICINE

## 2023-07-20 PROCEDURE — 85610 PROTHROMBIN TIME: CPT

## 2023-07-27 ENCOUNTER — TRANSCRIBE ORDERS (OUTPATIENT)
Dept: LAB | Facility: HOSPITAL | Age: 83
End: 2023-07-27

## 2023-07-27 ENCOUNTER — APPOINTMENT (OUTPATIENT)
Dept: LAB | Facility: HOSPITAL | Age: 83
End: 2023-07-27
Payer: COMMERCIAL

## 2023-07-27 DIAGNOSIS — I48.0 PAROXYSMAL ATRIAL FIBRILLATION (HCC): Primary | ICD-10-CM

## 2023-07-27 DIAGNOSIS — I48.0 PAROXYSMAL ATRIAL FIBRILLATION (HCC): ICD-10-CM

## 2023-08-03 ENCOUNTER — APPOINTMENT (OUTPATIENT)
Dept: LAB | Facility: HOSPITAL | Age: 83
End: 2023-08-03
Payer: COMMERCIAL

## 2023-08-03 ENCOUNTER — TRANSCRIBE ORDERS (OUTPATIENT)
Dept: LAB | Facility: HOSPITAL | Age: 83
End: 2023-08-03

## 2023-08-03 DIAGNOSIS — Z95.2 HEART VALVE REPLACED BY TRANSPLANT: ICD-10-CM

## 2023-08-03 DIAGNOSIS — Z95.2 HEART VALVE REPLACED BY TRANSPLANT: Primary | ICD-10-CM

## 2023-08-03 LAB
INR PPP: 2.58 (ref 0.84–1.19)
PROTHROMBIN TIME: 28 SECONDS (ref 11.6–14.5)

## 2023-08-03 PROCEDURE — 85610 PROTHROMBIN TIME: CPT

## 2023-08-03 PROCEDURE — 36415 COLL VENOUS BLD VENIPUNCTURE: CPT

## 2023-08-10 ENCOUNTER — TRANSCRIBE ORDERS (OUTPATIENT)
Dept: LAB | Facility: HOSPITAL | Age: 83
End: 2023-08-10

## 2023-08-10 ENCOUNTER — APPOINTMENT (OUTPATIENT)
Dept: LAB | Facility: HOSPITAL | Age: 83
End: 2023-08-10
Payer: COMMERCIAL

## 2023-08-10 DIAGNOSIS — I48.0 PAROXYSMAL ATRIAL FIBRILLATION (HCC): Primary | ICD-10-CM

## 2023-08-10 DIAGNOSIS — I48.0 PAROXYSMAL ATRIAL FIBRILLATION (HCC): ICD-10-CM

## 2023-08-10 LAB
INR PPP: 3.39 (ref 0.84–1.19)
PROTHROMBIN TIME: 34.5 SECONDS (ref 11.6–14.5)

## 2023-08-10 PROCEDURE — 36415 COLL VENOUS BLD VENIPUNCTURE: CPT

## 2023-08-10 PROCEDURE — 85610 PROTHROMBIN TIME: CPT

## 2023-08-17 ENCOUNTER — APPOINTMENT (OUTPATIENT)
Dept: LAB | Facility: HOSPITAL | Age: 83
End: 2023-08-17
Payer: COMMERCIAL

## 2023-08-17 DIAGNOSIS — I48.0 PAROXYSMAL ATRIAL FIBRILLATION (HCC): ICD-10-CM

## 2023-08-17 LAB
INR PPP: 3.25 (ref 0.84–1.19)
PROTHROMBIN TIME: 33.4 SECONDS (ref 11.6–14.5)

## 2023-08-17 PROCEDURE — 36415 COLL VENOUS BLD VENIPUNCTURE: CPT

## 2023-08-17 PROCEDURE — 85610 PROTHROMBIN TIME: CPT

## 2023-08-24 ENCOUNTER — APPOINTMENT (OUTPATIENT)
Dept: LAB | Facility: HOSPITAL | Age: 83
End: 2023-08-24
Payer: COMMERCIAL

## 2023-08-24 DIAGNOSIS — Z95.2 HEART VALVE REPLACED BY TRANSPLANT: ICD-10-CM

## 2023-08-24 LAB
INR PPP: 3.88 (ref 0.84–1.19)
PROTHROMBIN TIME: 38.3 SECONDS (ref 11.6–14.5)

## 2023-08-24 PROCEDURE — 85610 PROTHROMBIN TIME: CPT

## 2023-08-24 PROCEDURE — 36415 COLL VENOUS BLD VENIPUNCTURE: CPT

## 2023-08-31 ENCOUNTER — APPOINTMENT (OUTPATIENT)
Dept: LAB | Facility: HOSPITAL | Age: 83
End: 2023-08-31
Payer: COMMERCIAL

## 2023-08-31 DIAGNOSIS — Z95.2 HEART VALVE REPLACED BY TRANSPLANT: Primary | ICD-10-CM

## 2023-08-31 LAB
INR PPP: 3.98 (ref 0.84–1.19)
PROTHROMBIN TIME: 39.1 SECONDS (ref 11.6–14.5)

## 2023-08-31 PROCEDURE — 85610 PROTHROMBIN TIME: CPT

## 2023-08-31 PROCEDURE — 36415 COLL VENOUS BLD VENIPUNCTURE: CPT

## 2023-09-07 ENCOUNTER — APPOINTMENT (OUTPATIENT)
Dept: LAB | Facility: HOSPITAL | Age: 83
End: 2023-09-07
Payer: COMMERCIAL

## 2023-09-07 DIAGNOSIS — Z95.2 HEART VALVE REPLACED BY TRANSPLANT: Primary | ICD-10-CM

## 2023-09-07 LAB
INR PPP: 4.01 (ref 0.84–1.19)
PROTHROMBIN TIME: 39.3 SECONDS (ref 11.6–14.5)

## 2023-09-07 PROCEDURE — 85610 PROTHROMBIN TIME: CPT

## 2023-09-07 PROCEDURE — 36415 COLL VENOUS BLD VENIPUNCTURE: CPT

## 2023-09-14 ENCOUNTER — APPOINTMENT (OUTPATIENT)
Dept: LAB | Facility: HOSPITAL | Age: 83
End: 2023-09-14
Payer: COMMERCIAL

## 2023-09-14 DIAGNOSIS — Z95.2 HEART VALVE REPLACED BY TRANSPLANT: ICD-10-CM

## 2023-09-14 LAB
INR PPP: 2.55 (ref 0.84–1.19)
PROTHROMBIN TIME: 27.7 SECONDS (ref 11.6–14.5)

## 2023-09-14 PROCEDURE — 85610 PROTHROMBIN TIME: CPT

## 2023-09-14 PROCEDURE — 36415 COLL VENOUS BLD VENIPUNCTURE: CPT

## 2023-09-21 ENCOUNTER — APPOINTMENT (OUTPATIENT)
Dept: LAB | Facility: HOSPITAL | Age: 83
DRG: 604 | End: 2023-09-21
Payer: COMMERCIAL

## 2023-09-21 DIAGNOSIS — Z95.2 HEART VALVE REPLACED BY TRANSPLANT: ICD-10-CM

## 2023-09-21 LAB
INR PPP: 2.08 (ref 0.84–1.19)
PROTHROMBIN TIME: 23.6 SECONDS (ref 11.6–14.5)

## 2023-09-21 PROCEDURE — 36415 COLL VENOUS BLD VENIPUNCTURE: CPT

## 2023-09-21 PROCEDURE — 85610 PROTHROMBIN TIME: CPT

## 2023-09-22 ENCOUNTER — APPOINTMENT (EMERGENCY)
Dept: CT IMAGING | Facility: HOSPITAL | Age: 83
End: 2023-09-22
Payer: COMMERCIAL

## 2023-09-22 ENCOUNTER — HOSPITAL ENCOUNTER (EMERGENCY)
Facility: HOSPITAL | Age: 83
End: 2023-09-22
Attending: EMERGENCY MEDICINE
Payer: COMMERCIAL

## 2023-09-22 ENCOUNTER — APPOINTMENT (EMERGENCY)
Dept: RADIOLOGY | Facility: HOSPITAL | Age: 83
End: 2023-09-22
Payer: COMMERCIAL

## 2023-09-22 ENCOUNTER — HOSPITAL ENCOUNTER (INPATIENT)
Facility: HOSPITAL | Age: 83
LOS: 7 days | Discharge: NURSING, REHAB OR LONG-TERM CARE | DRG: 604 | End: 2023-09-29
Attending: SURGERY | Admitting: SURGERY
Payer: COMMERCIAL

## 2023-09-22 VITALS
OXYGEN SATURATION: 96 % | TEMPERATURE: 98.8 F | RESPIRATION RATE: 16 BRPM | HEIGHT: 60 IN | HEART RATE: 105 BPM | DIASTOLIC BLOOD PRESSURE: 82 MMHG | BODY MASS INDEX: 25.75 KG/M2 | WEIGHT: 131.17 LBS | SYSTOLIC BLOOD PRESSURE: 162 MMHG

## 2023-09-22 DIAGNOSIS — S41.012A LACERATION OF LEFT SHOULDER, INITIAL ENCOUNTER: ICD-10-CM

## 2023-09-22 DIAGNOSIS — R79.1 ELEVATED INR: ICD-10-CM

## 2023-09-22 DIAGNOSIS — S40.012A TRAUMATIC HEMATOMA OF LEFT SHOULDER, INITIAL ENCOUNTER: Primary | ICD-10-CM

## 2023-09-22 DIAGNOSIS — E87.1 HYPONATREMIA: ICD-10-CM
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
## 2023-09-22 PROBLEM — G40.909 EPILEPSY (HCC): Status: ACTIVE | Noted: 2023-09-22

## 2023-09-22 PROBLEM — Z95.2 S/P AORTIC VALVE REPLACEMENT: Status: ACTIVE | Noted: 2023-09-22

## 2023-09-22 LAB
ABO GROUP BLD: NORMAL
ABO GROUP BLD: NORMAL
ALBUMIN SERPL BCP-MCNC: 4.4 G/DL (ref 3.5–5)
ALP SERPL-CCNC: 90 U/L (ref 34–104)
ALT SERPL W P-5'-P-CCNC: 17 U/L (ref 7–52)
ANION GAP SERPL CALCULATED.3IONS-SCNC: 9 MMOL/L
APTT PPP: 36 SECONDS (ref 23–37)
AST SERPL W P-5'-P-CCNC: 20 U/L (ref 13–39)
ATRIAL RATE: 250 BPM
ATRIAL RATE: 91 BPM
BASOPHILS # BLD AUTO: 0.08 THOUSANDS/ÂΜL (ref 0–0.1)
BASOPHILS NFR BLD AUTO: 1 % (ref 0–1)
BILIRUB SERPL-MCNC: 0.56 MG/DL (ref 0.2–1)
BLD GP AB SCN SERPL QL: NEGATIVE
BUN SERPL-MCNC: 16 MG/DL (ref 5–25)
CALCIUM SERPL-MCNC: 9.9 MG/DL (ref 8.4–10.2)
CARDIAC TROPONIN I PNL SERPL HS: 16 NG/L
CHLORIDE SERPL-SCNC: 94 MMOL/L (ref 96–108)
CO2 SERPL-SCNC: 32 MMOL/L (ref 21–32)
CREAT SERPL-MCNC: 0.68 MG/DL (ref 0.6–1.3)
EOSINOPHIL # BLD AUTO: 0.2 THOUSAND/ÂΜL (ref 0–0.61)
EOSINOPHIL NFR BLD AUTO: 2 % (ref 0–6)
ERYTHROCYTE [DISTWIDTH] IN BLOOD BY AUTOMATED COUNT: 14.8 % (ref 11.6–15.1)
GFR SERPL CREATININE-BSD FRML MDRD: 81 ML/MIN/1.73SQ M
GLUCOSE SERPL-MCNC: 121 MG/DL (ref 65–140)
HCT VFR BLD AUTO: 30.2 % (ref 34.8–46.1)
HCT VFR BLD AUTO: 30.7 % (ref 34.8–46.1)
HCT VFR BLD AUTO: 30.7 % (ref 34.8–46.1)
HCT VFR BLD AUTO: 41 % (ref 34.8–46.1)
HGB BLD-MCNC: 10.1 G/DL (ref 11.5–15.4)
HGB BLD-MCNC: 10.4 G/DL (ref 11.5–15.4)
HGB BLD-MCNC: 13.1 G/DL (ref 11.5–15.4)
HGB BLD-MCNC: 9.9 G/DL (ref 11.5–15.4)
IMM GRANULOCYTES # BLD AUTO: 0.14 THOUSAND/UL (ref 0–0.2)
IMM GRANULOCYTES NFR BLD AUTO: 2 % (ref 0–2)
INR PPP: 1.38 (ref 0.84–1.19)
INR PPP: 2.32 (ref 0.84–1.19)
LYMPHOCYTES # BLD AUTO: 0.97 THOUSANDS/ÂΜL (ref 0.6–4.47)
LYMPHOCYTES NFR BLD AUTO: 11 % (ref 14–44)
MCH RBC QN AUTO: 29.4 PG (ref 26.8–34.3)
MCHC RBC AUTO-ENTMCNC: 32 G/DL (ref 31.4–37.4)
MCV RBC AUTO: 92 FL (ref 82–98)
MONOCYTES # BLD AUTO: 0.83 THOUSAND/ÂΜL (ref 0.17–1.22)
MONOCYTES NFR BLD AUTO: 10 % (ref 4–12)
NEUTROPHILS # BLD AUTO: 6.27 THOUSANDS/ÂΜL (ref 1.85–7.62)
NEUTS SEG NFR BLD AUTO: 74 % (ref 43–75)
NRBC BLD AUTO-RTO: 0 /100 WBCS
PLATELET # BLD AUTO: 255 THOUSANDS/UL (ref 149–390)
PMV BLD AUTO: 9.7 FL (ref 8.9–12.7)
POTASSIUM SERPL-SCNC: 3.6 MMOL/L (ref 3.5–5.3)
PROT SERPL-MCNC: 6.9 G/DL (ref 6.4–8.4)
PROTHROMBIN TIME: 17.2 SECONDS (ref 11.6–14.5)
PROTHROMBIN TIME: 26.7 SECONDS (ref 11.6–14.5)
QRS AXIS: 59 DEGREES
QRS AXIS: 74 DEGREES
QRSD INTERVAL: 82 MS
QRSD INTERVAL: 82 MS
QT INTERVAL: 336 MS
QT INTERVAL: 354 MS
QTC INTERVAL: 440 MS
QTC INTERVAL: 444 MS
RBC # BLD AUTO: 4.45 MILLION/UL (ref 3.81–5.12)
RH BLD: POSITIVE
RH BLD: POSITIVE
SODIUM SERPL-SCNC: 135 MMOL/L (ref 135–147)
SPECIMEN EXPIRATION DATE: NORMAL
T WAVE AXIS: 32 DEGREES
T WAVE AXIS: 95 DEGREES
VENTRICULAR RATE: 105 BPM
VENTRICULAR RATE: 93 BPM
WBC # BLD AUTO: 8.49 THOUSAND/UL (ref 4.31–10.16)

## 2023-09-22 PROCEDURE — 36415 COLL VENOUS BLD VENIPUNCTURE: CPT | Performed by: EMERGENCY MEDICINE

## 2023-09-22 PROCEDURE — 99223 1ST HOSP IP/OBS HIGH 75: CPT | Performed by: SURGERY

## 2023-09-22 PROCEDURE — 97167 OT EVAL HIGH COMPLEX 60 MIN: CPT

## 2023-09-22 PROCEDURE — 73201 CT UPPER EXTREMITY W/DYE: CPT

## 2023-09-22 PROCEDURE — 96365 THER/PROPH/DIAG IV INF INIT: CPT

## 2023-09-22 PROCEDURE — 72125 CT NECK SPINE W/O DYE: CPT

## 2023-09-22 PROCEDURE — 86900 BLOOD TYPING SEROLOGIC ABO: CPT | Performed by: STUDENT IN AN ORGANIZED HEALTH CARE EDUCATION/TRAINING PROGRAM

## 2023-09-22 PROCEDURE — 73030 X-RAY EXAM OF SHOULDER: CPT

## 2023-09-22 PROCEDURE — 85610 PROTHROMBIN TIME: CPT | Performed by: STUDENT IN AN ORGANIZED HEALTH CARE EDUCATION/TRAINING PROGRAM

## 2023-09-22 PROCEDURE — 86901 BLOOD TYPING SEROLOGIC RH(D): CPT | Performed by: STUDENT IN AN ORGANIZED HEALTH CARE EDUCATION/TRAINING PROGRAM

## 2023-09-22 PROCEDURE — G1004 CDSM NDSC: HCPCS

## 2023-09-22 PROCEDURE — 85014 HEMATOCRIT: CPT | Performed by: STUDENT IN AN ORGANIZED HEALTH CARE EDUCATION/TRAINING PROGRAM

## 2023-09-22 PROCEDURE — 84484 ASSAY OF TROPONIN QUANT: CPT | Performed by: EMERGENCY MEDICINE

## 2023-09-22 PROCEDURE — 86850 RBC ANTIBODY SCREEN: CPT | Performed by: STUDENT IN AN ORGANIZED HEALTH CARE EDUCATION/TRAINING PROGRAM

## 2023-09-22 PROCEDURE — 93010 ELECTROCARDIOGRAM REPORT: CPT | Performed by: INTERNAL MEDICINE

## 2023-09-22 PROCEDURE — 85730 THROMBOPLASTIN TIME PARTIAL: CPT | Performed by: EMERGENCY MEDICINE

## 2023-09-22 PROCEDURE — 80053 COMPREHEN METABOLIC PANEL: CPT | Performed by: EMERGENCY MEDICINE

## 2023-09-22 PROCEDURE — 96374 THER/PROPH/DIAG INJ IV PUSH: CPT

## 2023-09-22 PROCEDURE — 71045 X-RAY EXAM CHEST 1 VIEW: CPT

## 2023-09-22 PROCEDURE — 1123F ACP DISCUSS/DSCN MKR DOCD: CPT | Performed by: INTERNAL MEDICINE

## 2023-09-22 PROCEDURE — 93005 ELECTROCARDIOGRAM TRACING: CPT

## 2023-09-22 PROCEDURE — 97163 PT EVAL HIGH COMPLEX 45 MIN: CPT

## 2023-09-22 PROCEDURE — 96375 TX/PRO/DX INJ NEW DRUG ADDON: CPT

## 2023-09-22 PROCEDURE — 73110 X-RAY EXAM OF WRIST: CPT

## 2023-09-22 PROCEDURE — 85014 HEMATOCRIT: CPT | Performed by: NURSE PRACTITIONER

## 2023-09-22 PROCEDURE — 70450 CT HEAD/BRAIN W/O DYE: CPT

## 2023-09-22 PROCEDURE — 99285 EMERGENCY DEPT VISIT HI MDM: CPT

## 2023-09-22 PROCEDURE — 99223 1ST HOSP IP/OBS HIGH 75: CPT | Performed by: INTERNAL MEDICINE

## 2023-09-22 PROCEDURE — 85610 PROTHROMBIN TIME: CPT | Performed by: EMERGENCY MEDICINE

## 2023-09-22 PROCEDURE — 99284 EMERGENCY DEPT VISIT MOD MDM: CPT

## 2023-09-22 PROCEDURE — 85018 HEMOGLOBIN: CPT | Performed by: STUDENT IN AN ORGANIZED HEALTH CARE EDUCATION/TRAINING PROGRAM

## 2023-09-22 PROCEDURE — 85025 COMPLETE CBC W/AUTO DIFF WBC: CPT | Performed by: EMERGENCY MEDICINE

## 2023-09-22 PROCEDURE — 85018 HEMOGLOBIN: CPT | Performed by: NURSE PRACTITIONER

## 2023-09-22 PROCEDURE — 0HQCXZZ REPAIR LEFT UPPER ARM SKIN, EXTERNAL APPROACH: ICD-10-PCS | Performed by: FAMILY MEDICINE

## 2023-09-22 RX ORDER — OXYBUTYNIN CHLORIDE 5 MG/1
5 TABLET ORAL 2 TIMES DAILY
Status: DISCONTINUED | OUTPATIENT
Start: 2023-09-22 | End: 2023-09-29 | Stop reason: HOSPADM

## 2023-09-22 RX ORDER — DIPHENHYDRAMINE HYDROCHLORIDE 50 MG/ML
25 INJECTION INTRAMUSCULAR; INTRAVENOUS ONCE
Status: COMPLETED | OUTPATIENT
Start: 2023-09-22 | End: 2023-09-22

## 2023-09-22 RX ORDER — LIDOCAINE HYDROCHLORIDE 10 MG/ML
10 INJECTION, SOLUTION EPIDURAL; INFILTRATION; INTRACAUDAL; PERINEURAL ONCE
Status: COMPLETED | OUTPATIENT
Start: 2023-09-22 | End: 2023-09-22

## 2023-09-22 RX ORDER — CHOLECALCIFEROL (VITAMIN D3) 125 MCG
9000 CAPSULE ORAL 3 TIMES DAILY PRN
Status: DISCONTINUED | OUTPATIENT
Start: 2023-09-22 | End: 2023-09-29 | Stop reason: HOSPADM

## 2023-09-22 RX ORDER — LEVOTHYROXINE SODIUM 0.12 MG/1
125 TABLET ORAL
Status: DISCONTINUED | OUTPATIENT
Start: 2023-09-23 | End: 2023-09-29 | Stop reason: HOSPADM

## 2023-09-22 RX ORDER — CEFAZOLIN SODIUM 2 G/50ML
2000 SOLUTION INTRAVENOUS ONCE
Status: COMPLETED | OUTPATIENT
Start: 2023-09-22 | End: 2023-09-22

## 2023-09-22 RX ORDER — ALBUTEROL SULFATE 90 UG/1
2 AEROSOL, METERED RESPIRATORY (INHALATION) EVERY 6 HOURS PRN
Status: DISCONTINUED | OUTPATIENT
Start: 2023-09-22 | End: 2023-09-29 | Stop reason: HOSPADM

## 2023-09-22 RX ORDER — ENOXAPARIN SODIUM 100 MG/ML
30 INJECTION SUBCUTANEOUS EVERY 12 HOURS SCHEDULED
Status: DISCONTINUED | OUTPATIENT
Start: 2023-09-22 | End: 2023-09-23

## 2023-09-22 RX ORDER — ATORVASTATIN CALCIUM 80 MG/1
80 TABLET, FILM COATED ORAL DAILY
Status: DISCONTINUED | OUTPATIENT
Start: 2023-09-23 | End: 2023-09-29 | Stop reason: HOSPADM

## 2023-09-22 RX ORDER — OXYCODONE HYDROCHLORIDE 5 MG/1
5 TABLET ORAL EVERY 6 HOURS PRN
Status: DISCONTINUED | OUTPATIENT
Start: 2023-09-22 | End: 2023-09-29 | Stop reason: HOSPADM

## 2023-09-22 RX ORDER — DILTIAZEM HYDROCHLORIDE 90 MG/1
90 CAPSULE, EXTENDED RELEASE ORAL EVERY 12 HOURS SCHEDULED
Status: DISCONTINUED | OUTPATIENT
Start: 2023-09-22 | End: 2023-09-29 | Stop reason: HOSPADM

## 2023-09-22 RX ORDER — ACETAMINOPHEN 325 MG/1
650 TABLET ORAL EVERY 6 HOURS PRN
Status: DISCONTINUED | OUTPATIENT
Start: 2023-09-22 | End: 2023-09-29 | Stop reason: HOSPADM

## 2023-09-22 RX ORDER — LIDOCAINE HYDROCHLORIDE AND EPINEPHRINE 10; 10 MG/ML; UG/ML
INJECTION, SOLUTION INFILTRATION; PERINEURAL
Status: COMPLETED
Start: 2023-09-22 | End: 2023-09-22

## 2023-09-22 RX ORDER — LAMOTRIGINE 100 MG/1
100 TABLET ORAL 2 TIMES DAILY
Status: DISCONTINUED | OUTPATIENT
Start: 2023-09-22 | End: 2023-09-29 | Stop reason: HOSPADM

## 2023-09-22 RX ORDER — GINSENG 100 MG
1 CAPSULE ORAL ONCE
Status: COMPLETED | OUTPATIENT
Start: 2023-09-22 | End: 2023-09-22

## 2023-09-22 RX ORDER — SERTRALINE HYDROCHLORIDE 100 MG/1
100 TABLET, FILM COATED ORAL DAILY
Status: DISCONTINUED | OUTPATIENT
Start: 2023-09-23 | End: 2023-09-29 | Stop reason: HOSPADM

## 2023-09-22 RX ORDER — FLUTICASONE FUROATE AND VILANTEROL 100; 25 UG/1; UG/1
1 POWDER RESPIRATORY (INHALATION)
Status: DISCONTINUED | OUTPATIENT
Start: 2023-09-23 | End: 2023-09-29 | Stop reason: HOSPADM

## 2023-09-22 RX ORDER — POTASSIUM CHLORIDE 20 MEQ/1
40 TABLET, EXTENDED RELEASE ORAL ONCE
Status: COMPLETED | OUTPATIENT
Start: 2023-09-22 | End: 2023-09-22

## 2023-09-22 RX ORDER — PANTOPRAZOLE SODIUM 20 MG/1
20 TABLET, DELAYED RELEASE ORAL
Status: DISCONTINUED | OUTPATIENT
Start: 2023-09-22 | End: 2023-09-29 | Stop reason: HOSPADM

## 2023-09-22 RX ORDER — ASCORBIC ACID 500 MG
500 TABLET ORAL DAILY
Status: DISCONTINUED | OUTPATIENT
Start: 2023-09-23 | End: 2023-09-29 | Stop reason: HOSPADM

## 2023-09-22 RX ORDER — HYDROMORPHONE HCL IN WATER/PF 6 MG/30 ML
0.2 PATIENT CONTROLLED ANALGESIA SYRINGE INTRAVENOUS
Status: DISCONTINUED | OUTPATIENT
Start: 2023-09-22 | End: 2023-09-29 | Stop reason: HOSPADM

## 2023-09-22 RX ADMIN — ACETAMINOPHEN 650 MG: 325 TABLET, FILM COATED ORAL at 16:08

## 2023-09-22 RX ADMIN — OXYBUTYNIN CHLORIDE 5 MG: 5 TABLET ORAL at 21:47

## 2023-09-22 RX ADMIN — DIPHENHYDRAMINE HYDROCHLORIDE 25 MG: 50 INJECTION, SOLUTION INTRAMUSCULAR; INTRAVENOUS at 02:35

## 2023-09-22 RX ADMIN — DILTIAZEM HYDROCHLORIDE 90 MG: 90 CAPSULE, EXTENDED RELEASE ORAL at 21:46

## 2023-09-22 RX ADMIN — POTASSIUM CHLORIDE 40 MEQ: 1500 TABLET, EXTENDED RELEASE ORAL at 06:02

## 2023-09-22 RX ADMIN — LIDOCAINE HYDROCHLORIDE 10 ML: 10 INJECTION, SOLUTION EPIDURAL; INFILTRATION; INTRACAUDAL; PERINEURAL at 01:17

## 2023-09-22 RX ADMIN — SODIUM CHLORIDE 500 ML: 0.9 INJECTION, SOLUTION INTRAVENOUS at 03:25

## 2023-09-22 RX ADMIN — LAMOTRIGINE 100 MG: 100 TABLET ORAL at 08:09

## 2023-09-22 RX ADMIN — PANTOPRAZOLE SODIUM 20 MG: 20 TABLET, DELAYED RELEASE ORAL at 06:05

## 2023-09-22 RX ADMIN — LAMOTRIGINE 100 MG: 100 TABLET ORAL at 21:47

## 2023-09-22 RX ADMIN — ENOXAPARIN SODIUM 30 MG: 30 INJECTION SUBCUTANEOUS at 21:47

## 2023-09-22 RX ADMIN — PHYTONADIONE 10 MG: 10 INJECTION, EMULSION INTRAMUSCULAR; INTRAVENOUS; SUBCUTANEOUS at 05:51

## 2023-09-22 RX ADMIN — LIDOCAINE HYDROCHLORIDE,EPINEPHRINE BITARTRATE 20 ML: 10; .01 INJECTION, SOLUTION INFILTRATION; PERINEURAL at 02:26

## 2023-09-22 RX ADMIN — IOHEXOL 85 ML: 350 INJECTION, SOLUTION INTRAVENOUS at 03:09

## 2023-09-22 RX ADMIN — CEFAZOLIN SODIUM 2000 MG: 2 SOLUTION INTRAVENOUS at 03:25

## 2023-09-22 RX ADMIN — BACITRACIN ZINC 1 SMALL APPLICATION: 500 OINTMENT TOPICAL at 01:19

## 2023-09-22 RX ADMIN — ACETAMINOPHEN 650 MG: 325 TABLET, FILM COATED ORAL at 06:42

## 2023-09-22 RX ADMIN — Medication 1500 UNITS: at 05:23

## 2023-09-22 RX ADMIN — BACITRACIN ZINC 1 SMALL APPLICATION: 500 OINTMENT TOPICAL at 02:37

## 2023-09-22 NOTE — PHYSICAL THERAPY NOTE
PHYSICAL THERAPY EVALUATION  NAME:  Shawna Solares  DATE: 09/22/23    AGE:   80 y.o. Mrn:   [id number]  ADMIT DX:  Fall, initial encounter [facility]. XXXA]  Unspecified multiple injuries, initial encounter [T07. XXXA]    Past Medical History:   Diagnosis Date   • Anemia    • Anxiety disorder, unspecified    • Anxiety disorder, unspecified    • Cardiac arrest, cause unspecified ([address])    • Chronic obstructive pulmonary disease, unspecified (HCC)    • Constipation    • Essential (primary) hypertension    • GERD (gastroesophageal reflux disease)    • Hypo-osmolality and hyponatremia    • Hypothyroidism    • Nontraumatic hematoma of soft tissue    • Other seizures (HCC)    • Persistent atrial fibrillation (HCC)    • Unspecified urinary incontinence        Past Surgical History:   Procedure Laterality Date   • APPENDECTOMY     • CARDIAC PACEMAKER PLACEMENT     • FEMUR FRACTURE SURGERY         Length Of Stay: 0    PHYSICAL THERAPY EVALUATION:     Actual Pt care time [id number]   09/22/23 1105   Note Type   Note type Evaluation   Pain Assessment   Pain Assessment Tool 0-10   Pain Score 7   Pain Location/Orientation Orientation: Left; Location: Shoulder   Pain Onset/Description Onset: Ongoing;Frequency: Constant/Continuous; Descriptor: Aching   Effect of Pain on Daily Activities increased pain with activity   Patient's Stated Pain Goal No pain   Hospital Pain Intervention(s) Ambulation/increased activity;Repositioned   Restrictions/Precautions   Weight Bearing Precautions Per Order No   Other Precautions Chair Alarm; Bed Alarm;Multiple lines; Fall Risk;Pain   Home Living   Type of Home Other (Comment)  ([address] [facility])   Home Layout One level   [place]; Other (Comment)  (rollator)   Additional Comments Pt reports living at [facility] and states that she has assist from her neighbors and staff if needed.  Need to clarify level of support available to pt   Prior Function   Level of [place] with functional mobility   Lives With Facility staff   Receives Help From Family;Friend(s); Personal care attendant   Falls in the last 6 months 1 to 4   Comments Pt reports the use of a rollator for ambulation PTA   General   Family/Caregiver Present No   Cognition   Overall Cognitive Status WFL   Arousal/Participation Alert   Orientation Level Oriented X4   Memory Within functional limits   Following Commands Follows all commands and directions without difficulty   RUE Assessment   RUE Assessment WFL   LUE Assessment   LUE Assessment X   RLE Assessment   RLE Assessment WFL   Strength RLE   RLE Overall Strength 4-/5   LLE Assessment   LLE Assessment WFL   Strength LLE   LLE Overall Strength 4-/5   Bed Mobility   Supine to Sit 4  Minimal assistance   Additional items Assist x 1; Increased time required;Verbal cues   Transfers   Sit to Stand 4  Minimal assistance   Additional items Assist x 1; Increased time required;Verbal cues   Stand to Sit 4  Minimal assistance   Additional items Assist x 1; Increased time required;Verbal cues   Additional Comments cues needed for hand placement during transfers   Ambulation/Elevation   Gait pattern Excessively slow; Short stride; Foward flexed; Inconsistent daisy   Gait Assistance 4  Minimal assist   Additional items Assist x 1   Assistive Device Rolling walker   Distance 10ft x 2   Balance   Static Sitting Fair -   Static Standing Poor +   Ambulatory Poor +   Endurance Deficit   Endurance Deficit Yes   Endurance Deficit Description fatigue, pain   Activity Tolerance   Activity Tolerance Patient limited by fatigue;Patient limited by pain   Medical Staff Made Aware [place], OT; OT present for co evaluation due to pts current medical presentation   Nurse Made Aware Pt appropriate to be seen and mobilize per nsg   Assessment   Prognosis Good   Problem List Decreased strength;Decreased range of motion;Decreased endurance; Impaired balance;Decreased mobility;Pain   Assessment Pt is 80 y.o. female seen for PT evaluation s/p admit to [facility] on 9/22/2023. Two pt identifiers were used to confirm. Pt presented s/p fall. Pt was admitted with a primary dx of: traumatic hematoma of L shoulder, and other active problems including s/p aortic valve replacement, epilepsy. PT now consulted for assessment of mobility and d/c needs. Pt with Up in chair orders. Pts current co morbidities affecting treatment include:  has a past medical history of Anemia, Anxiety disorder, unspecified, Anxiety disorder, unspecified, Cardiac arrest, cause unspecified ([address]), Chronic obstructive pulmonary disease, unspecified ([address]), Constipation, Essential (primary) hypertension, GERD (gastroesophageal reflux disease), Hypo-osmolality and hyponatremia, Hypothyroidism, Nontraumatic hematoma of soft tissue, Other seizures ([address]), Persistent atrial fibrillation ([address]), and Unspecified urinary incontinence. . Pts current clinical presentation is Unstable/ Unpredictable (high complexity) due to Ongoing medical management for primary dx, Decreased activity tolerance compared to baseline, Fall risk, Increased assistance needed from caregiver at current time, Continuous pulse oximetry monitoring   . Upon evaluation, pt currently is requiring Min Ax1 for bed mobility; Min Ax1 for transfers and Min Ax1 for ambulation w/ RW. Pt presents at PT eval functioning below baseline and currently w/ overall mobility deficits 2* to: BLE weakness, decreased ROM, impaired balance, decreased endurance, gait deviations, pain, decreased activity tolerance compared to baseline, fall risk. Pt currently at a fall risk 2* to impairments listed above. Based on the aforementioned PT evaluation, pt will continue to benefit from skilled Acute PT interventions to address stated impairments; to maximize functional mobility; for ongoing pt/ family training; and DME needs.  At conclusion of PT session pt returned BTB with phone and call bell within reach. Pt denies any further questions at this time. PT is currently recommending Rehab. PT will continue to follow during hospital stay. Goals   Patient Goals " to go home"   STG Expiration Date 10/02/23   Short Term Goal #1 In 10 days pt will complete: 1) Bed mobility skills with mod I to increase safety and independence as well as decrease caregiver burden. 2) Functional transfers with mod I to promote increased independence, safety, and QOL. 3) Ambulate 150' using least restrictive AD with mod I without LOB and stable vitals so that pt can negotiate previous living environment safely and promote independence with functional mobility and return to PLOF. 4) Improve balance grades by 1/2 grade to increase safety with all mobility and decrease fall risk. 5) Improve BLE strength by 1/2 grade to help increase overall functional mobility and decrease fall risk. Plan   Treatment/Interventions Functional transfer training;LE strengthening/ROM; Therapeutic exercise; Endurance training;Patient/family training;Equipment eval/education; Bed mobility;Gait training;Spoke to nursing;OT   PT Frequency 3-5x/wk   Recommendation   PT Discharge Recommendation Post acute rehabilitation services   Equipment Recommended [facility] Recommended Wheeled walker   AM-PAC Basic Mobility Inpatient   Turning in Flat Bed Without Bedrails 3   Lying on Back to Sitting on Edge of Flat Bed Without Bedrails 3   Moving Bed to Chair 3   Standing Up From Chair Using Arms 3   Walk in Room 2   Climb 3-5 Stairs With Railing 2   Basic Mobility Inpatient Raw Score 16   Basic Mobility Standardized Score 38.32   Highest Level Of Mobility   JH-HLM Goal 5: Stand one or more mins   JH-HLM Achieved 6: Walk 10 steps or more   Modified Taran Scale   Modified Taran Scale 4   Barthel Index   Feeding 10   Bathing 0   Grooming Score 5   Dressing Score 5   Bladder Score 10   Bowels Score 10   Toilet Use Score 5   Transfers (Bed/Chair) Score 5 Mobility (Level Surface) Score 0   Stairs Score 0   Barthel Index Score 50   Portions of the documentation may have been created using voice recognition software. Occasional wrong word or sound alike substitutions may have occurred due to the inherent limitations of the voice recognition software. Read the chart carefully and recognize, using context, where substitutions have occurred.     Asia Howard, PT, DPT

## 2023-09-22 NOTE — EMTALA/ACUTE CARE TRANSFER
[facility] EMERGENCY DEPARTMENT  [address]  [facility] [address]  Dept: [phone]      EMTALA TRANSFER CONSENT    NAME Lay Hendrickson                                         1940                              MRN [id number]    I have been informed of my rights regarding examination, treatment, and transfer   by Dr. Calvin Giraldo DO    Benefits: Specialized equipment and/or services available at the receiving facility (Include comment)________________________ (Trauma)    Risks: Potential for delay in receiving treatment, Potential deterioration of medical condition, Loss of IV, Increased discomfort during transfer, Possible worsening of condition or death during transfer      Consent for Transfer:  I acknowledge that my medical condition has been evaluated and explained to me by the emergency department physician or other qualified medical person and/or my attending physician, who has recommended that I be transferred to the service of  Accepting Physician: Dr. Estefania Purdy at [address] [place] [address] [address] Name, [address] : [facility]. The above potential benefits of such transfer, the potential risks associated with such transfer, and the probable risks of not being transferred have been explained to me, and I fully understand them. The doctor has explained that, in my case, the benefits of transfer outweigh the risks. I agree to be transferred. I authorize the performance of emergency medical procedures and treatments upon me in both transit and upon arrival at the receiving facility. Additionally, I authorize the release of any and all medical records to the receiving facility and request they be transported with me, if possible. I understand that the safest mode of transportation during a medical emergency is an ambulance and that the Hospital advocates the use of this mode of transport.  Risks of traveling to the receiving facility by car, including absence of medical control, life sustaining equipment, such as oxygen, and medical personnel has been explained to me and I fully understand them. (ANGELICA CORRECT BOX BELOW)  [  ]  I consent to the stated transfer and to be transported by ambulance/helicopter. [  ]  I consent to the stated transfer, but refuse transportation by ambulance and accept full responsibility for my transportation by car. I understand the risks of non-ambulance transfers and I exonerate the Hospital and its staff from any deterioration in my condition that results from this refusal.    X___________________________________________    DATE  23  TIME________  Signature of patient or legally responsible individual signing on patient behalf           RELATIONSHIP TO PATIENT_________________________          Provider Certification    NAME Bhavin Ulrich DOB 1940                              MRN [id number]    A medical screening exam was performed on the above named patient. Based on the examination:    Condition Necessitating Transfer The primary encounter diagnosis was Traumatic hematoma of left shoulder, initial encounter. Diagnoses of Laceration of left shoulder, initial encounter and Elevated INR were also pertinent to this visit.     Patient Condition: The patient has been stabilized such that within reasonable medical probability, no material deterioration of the patient condition or the condition of the unborn child(trudy) is likely to result from the transfer    Reason for Transfer: Level of Care needed not available at this facility    Transfer Requirements: Facility [facility]   · Space available and qualified personnel available for treatment as acknowledged by    · Agreed to accept transfer and to provide appropriate medical treatment as acknowledged by       Dr. Fannie Clayton  · Appropriate medical records of the examination and treatment of the patient are provided at the time of transfer   [address] _______  · Transfer will be performed by qualified personnel from    and appropriate transfer equipment as required, including the use of necessary and appropriate life support measures. Provider Certification: I have examined the patient and explained the following risks and benefits of being transferred/refusing transfer to the patient/family:  General risk, such as traffic hazards, adverse weather conditions, rough terrain or turbulence, possible failure of equipment (including vehicle or aircraft), or consequences of actions of persons outside the control of the transport personnel, Unanticipated needs of medical equipment and personnel during transport, Risk of worsening condition, The possibility of a transport vehicle being unavailable      Based on these reasonable risks and benefits to the patient and/or the unborn child(trudy), and based upon the information available at the time of the patient’s examination, I certify that the medical benefits reasonably to be expected from the provision of appropriate medical treatments at another medical facility outweigh the increasing risks, if any, to the individual’s medical condition, and in the case of labor to the unborn child, from effecting the transfer.     X____________________________________________ DATE 09/22/23        TIME_______      ORIGINAL - SEND TO MEDICAL RECORDS   COPY - SEND WITH PATIENT DURING TRANSFER

## 2023-09-22 NOTE — H&P
[address] [facility]  H&P  Name: Brennan Smith 80 y.o. female I MRN: [id number]  Unit/Bed#: ED 25 I Date of Admission: 9/22/2023   Date of Service: 9/22/2023 I Hospital Day: 0      Assessment/Plan   Epilepsy [facility])  Assessment & Plan  · Continue home meds    S/P aortic valve replacement  Assessment & Plan  · On warfarin, holding for now due to bleed    Fall  Assessment & Plan  · Pt/ OT eval    * Traumatic hematoma of left shoulder  Assessment & Plan  · With associated laceration status post bedside closure 9/22  · Compression wrap applied  · Trend hgb   · Monitor for signs infection           Trauma Alert: Other transfer   Model of Arrival: Ambulance    Trauma Team: Attending Martell Castaneda  Consultants:     None         History of Present Illness     Chief Complaint: left shoulder pain  Mechanism:Fall     HPI:    Brennan Smith is a 80 y.o. female transfer from [facility] with PMH of  aortic valve replacement (warfarin), COPD, permanent A. fib, permanent pacemaker implantation, hypertension, hyperlipidemia, SAM with use of BiPAP and epilepsy who presents status post fall with left shoulder laceration. She states she was walking across her room when she fell, striking her left shoulder against a piece of furniture. Unclear head strike. Unclear LOC. Review of Systems   Constitutional: Negative for activity change, fatigue and fever. Eyes: Negative for discharge. Respiratory: Negative for apnea, chest tightness and shortness of breath. Cardiovascular: Negative for chest pain. Gastrointestinal: Negative for abdominal distention, abdominal pain, nausea and vomiting. Genitourinary: Negative for difficulty urinating. Musculoskeletal: Negative for neck stiffness. Skin: Positive for wound. Neurological: Negative for dizziness and numbness. 12-point, complete review of systems was reviewed and negative except as stated above.      Historical Information     Past Medical History:   Diagnosis Date   • Anemia    • Anxiety disorder, unspecified    • Anxiety disorder, unspecified    • Cardiac arrest, cause unspecified ([address])    • Chronic obstructive pulmonary disease, unspecified (HCC)    • Constipation    • Essential (primary) hypertension    • GERD (gastroesophageal reflux disease)    • Hypo-osmolality and hyponatremia    • Hypothyroidism    • Nontraumatic hematoma of soft tissue    • Other seizures (HCC)    • Persistent atrial fibrillation (HCC)    • Unspecified urinary incontinence      Past Surgical History:   Procedure Laterality Date   • APPENDECTOMY     • CARDIAC PACEMAKER PLACEMENT     • FEMUR FRACTURE SURGERY          Social History     Tobacco Use   • Smoking status: Never   • Smokeless tobacco: Never   Vaping Use   • Vaping Use: Never used   Substance Use Topics   • Alcohol use: Not Currently   • Drug use: Never       There is no immunization history on file for this patient. Last Tetanus: today  Family History: Non-contributory    1. Before the illness or injury that brought you to the Emergency, did you need someone to help you on a regular basis? 1=Yes   2. Since the illness or injury that brought you to the Emergency, have you needed more help than usual to take care of yourself? 1=Yes   3. Have you been hospitalized for one or more nights during the past 6 months (excluding a stay in the Emergency Department)? 0=No   4. In general, do you see well? 0=Yes   5. In general, do you have serious problems with your memory? 0=No   6. Do you take more than three different medications everyday?  1=Yes   TOTAL   3     Did you order a geriatric consult if the score was 2 or greater?: yes     Meds/Allergies   all current active meds have been reviewed     Allergies   Allergen Reactions   • Erythromycin Photosensitivity   • Shellfish-Derived Products - Food Allergy Hives   • Ephedrine Rash   • Iodinated Contrast Media Rash   • Latex Rash   • Nsaids Rash   • Povidone Iodine Rash   • Salicylates Rash   • Shrimp (Diagnostic) - Food Allergy Rash   • Sympathomimetics Rash   • Tiotropium Rash   • Tositumomab Rash       Objective   Initial Vitals:   Temperature: 98.1 °F (36.7 °C) (09/22/23 0436)  Pulse: 101 (09/22/23 0436)  Respirations: 18 (09/22/23 0436)  Blood Pressure: 127/60 (09/22/23 0436)    Primary Survey:   Airway:        Status: patent;                  Breathing:                      Right breath sounds: normal       Left breath sounds: normal  Circulation:        Rhythm: regular       Rate: regular   Right Pulses Left Pulses                        Disability:        GCS: Eye: 4; Verbal: 5 Motor: 6 Total: 15       Right Pupil:       Left Pupil:     R Motor Strength L Motor Strength               Exposure:       Completed: No      Secondary Survey:  Physical Exam  Constitutional:       General: She is not in acute distress. Appearance: She is not toxic-appearing. HENT:      Head: Normocephalic and atraumatic. Right Ear: External ear normal.      Left Ear: External ear normal.      Nose: Nose normal.      Mouth/Throat:      Mouth: Mucous membranes are moist.   Eyes:      Pupils: Pupils are equal, round, and reactive to light. Cardiovascular:      Rate and Rhythm: Normal rate. Pulses: Normal pulses. Pulmonary:      Effort: Pulmonary effort is normal. No respiratory distress. Abdominal:      General: Abdomen is flat. Musculoskeletal:         General: No swelling or tenderness. Cervical back: Normal range of motion. No tenderness. Comments: Motor/ neuro intact   Skin:     General: Skin is warm. Comments: Ecchymosis noted left shoulder, non surgical oozing from laceration repair and skin tears dressed with xeroform and pressure dressing   Neurological:      General: No focal deficit present. Mental Status: She is alert and oriented to person, place, and time.    Psychiatric:         Mood and Affect: Mood normal.         Invasive Devices Peripheral Intravenous Line  Duration           Peripheral IV 09/22/23 Dorsal (posterior); Right Hand <1 day              Lab Results: I have personally reviewed all pertinent laboratory/test results 09/22/23 and in the preceding 24 hours. Recent Labs     09/22/23  0100 09/22/23  0225   WBC 8.49  --    HGB 13.1  --    HCT 41.0  --      --    SODIUM  --  135   K  --  3.6   CL  --  94*   CO2  --  32   BUN  --  16   CREATININE  --  0.68   GLUC  --  121   AST  --  20   ALT  --  17   ALB  --  4.4   TBILI  --  0.56   ALKPHOS  --  90   PTT  --  36   INR  --  2.32*   HSTNI0  --  16       Imaging Results: I have personally reviewed pertinent images saved in PACS. CT scan findings (and other pertinent positive findings on images) were discussed with radiology. My interpretation of the images/reports are as follows:  Chest Xray(s): pending   FAST exam(s): N/A   CT Scan(s): positive for acute findings: see assessment   Additional Xray(s): N/A     Other Studies:     Code Status: Level 1 - Full Code  Advance Directive and Living Will: Yes    Power of : Yes  POLST:    I have spent 20 minutes with Patient  today in which greater than 50% of this time was spent in counseling/coordination of care regarding Diagnostic results.

## 2023-09-22 NOTE — PLAN OF CARE
Problem: PHYSICAL THERAPY ADULT  Goal: Performs mobility at highest level of function for planned discharge setting. See evaluation for individualized goals. Description: Treatment/Interventions: Functional transfer training, LE strengthening/ROM, Therapeutic exercise, Endurance training, Patient/family training, Equipment eval/education, Bed mobility, Gait training, Spoke to nursing, OT  Equipment Recommended: Marii Rabago       See flowsheet documentation for full assessment, interventions and recommendations. Note: Prognosis: Good  Problem List: Decreased strength, Decreased range of motion, Decreased endurance, Impaired balance, Decreased mobility, Pain  Assessment: Pt is 80 y.o. female seen for PT evaluation s/p admit to [address] [place] [address] on 9/22/2023. Two pt identifiers were used to confirm. Pt presented s/p fall. Pt was admitted with a primary dx of: traumatic hematoma of L shoulder, and other active problems including s/p aortic valve replacement, epilepsy. PT now consulted for assessment of mobility and d/c needs. Pt with Up in chair orders. Pts current co morbidities affecting treatment include:  has a past medical history of Anemia, Anxiety disorder, unspecified, Anxiety disorder, unspecified, Cardiac arrest, cause unspecified ([address]), Chronic obstructive pulmonary disease, unspecified ([address]), Constipation, Essential (primary) hypertension, GERD (gastroesophageal reflux disease), Hypo-osmolality and hyponatremia, Hypothyroidism, Nontraumatic hematoma of soft tissue, Other seizures ([address]), Persistent atrial fibrillation ([address]), and Unspecified urinary incontinence. . Pts current clinical presentation is Unstable/ Unpredictable (high complexity) due to Ongoing medical management for primary dx, Decreased activity tolerance compared to baseline, Fall risk, Increased assistance needed from caregiver at current time, Continuous pulse oximetry monitoring   .  Upon evaluation, pt currently is requiring Min Ax1 for bed mobility; Min Ax1 for transfers and Min Ax1 for ambulation w/ RW. Pt presents at PT eval functioning below baseline and currently w/ overall mobility deficits 2* to: BLE weakness, decreased ROM, impaired balance, decreased endurance, gait deviations, pain, decreased activity tolerance compared to baseline, fall risk. Pt currently at a fall risk 2* to impairments listed above. Based on the aforementioned PT evaluation, pt will continue to benefit from skilled Acute PT interventions to address stated impairments; to maximize functional mobility; for ongoing pt/ family training; and DME needs. At conclusion of PT session pt returned BTB with phone and call bell within reach. Pt denies any further questions at this time. PT is currently recommending Rehab. PT will continue to follow during hospital stay. PT Discharge Recommendation: Post acute rehabilitation services    See flowsheet documentation for full assessment.

## 2023-09-22 NOTE — ASSESSMENT & PLAN NOTE
· With associated laceration status post bedside closure 9/22  · Compression wrap applied  · Trend hgb   · Monitor for signs infection

## 2023-09-22 NOTE — PLAN OF CARE
Problem: OCCUPATIONAL THERAPY ADULT  Goal: Performs self-care activities at highest level of function for planned discharge setting. See evaluation for individualized goals. Description: Treatment Interventions: ADL retraining, Functional transfer training, UE strengthening/ROM, Endurance training, Patient/family training, Equipment evaluation/education, Compensatory technique education, Activityengagement          See flowsheet documentation for full assessment, interventions and recommendations. Note: Limitation: Decreased ADL status, Decreased UE ROM, Decreased endurance, Decreased self-care trans, Decreased high-level ADLs  Prognosis: Good  Assessment: Pt is a 80 y.o. female who was admitted to [address] [place] [address] on 9/22/2023 with Traumatic hematoma of left shoulder s/p fall. Patient  has a past medical history of Anemia, Anxiety disorder, unspecified, Anxiety disorder, unspecified, Cardiac arrest, cause unspecified ([address]), Chronic obstructive pulmonary disease, unspecified ([address]), Constipation, Essential (primary) hypertension, GERD (gastroesophageal reflux disease), Hypo-osmolality and hyponatremia, Hypothyroidism, Nontraumatic hematoma of soft tissue, Other seizures ([address]), Persistent atrial fibrillation ([address]), and Unspecified urinary incontinence. At baseline pt was completing adls and mobility independently - has assist for showers and homemaking. Pt lives at [facility]. Currently pt requires min assist for overall ADLS and min assist for functional mobility/transfers. Pt currently presents with impairments in the following categories -limited home support, difficulty performing ADLS and environment activity tolerance, endurance, standing balance/tolerance and UE ROM.  These impairments, as well as pt's fatigue, pain, decreased caregiver support, risk for falls and home environment  limit pt's ability to safely engage in all baseline areas of occupation, includingbathing, dressing, toileting, functional mobility/transfers, community mobility, social participation  and leisure activities  From OT standpoint, recommend post acute rehab unless facility can provide increased support upon D/C. OT will continue to follow to address the below stated goals.      OT Discharge Recommendation: Post acute rehabilitation services

## 2023-09-22 NOTE — OCCUPATIONAL THERAPY NOTE
Occupational Therapy Evaluation     Patient Name: Toya Frost  [id number] Date: 9/22/2023  Problem List  Principal Problem:    Traumatic hematoma of left shoulder  Active Problems:    Fall    S/P aortic valve replacement    Epilepsy ([address])    Past Medical History  Past Medical History:   Diagnosis Date    Anemia     Anxiety disorder, unspecified     Anxiety disorder, unspecified     Cardiac arrest, cause unspecified ([address])     Chronic obstructive pulmonary disease, unspecified ([address])     Constipation     Essential (primary) hypertension     GERD (gastroesophageal reflux disease)     Hypo-osmolality and hyponatremia     Hypothyroidism     Nontraumatic hematoma of soft tissue     Other seizures (HCC)     Persistent atrial fibrillation (HCC)     Unspecified urinary incontinence      Past Surgical History  Past Surgical History:   Procedure Laterality Date    APPENDECTOMY      CARDIAC PACEMAKER PLACEMENT      FEMUR FRACTURE SURGERY           09/22/23 1106   OT Last Visit   OT Visit Date 09/22/23   Note Type   Note type Evaluation   Pain Assessment   Pain Assessment Tool 0-10   Pain Score 7   Pain Location/Orientation Orientation: Left; Location: [facility]   Hospital Pain Intervention(s) Repositioned; Ambulation/increased activity; Emotional support;Relaxation technique   Restrictions/Precautions   Weight Bearing Precautions Per Order No   Other Precautions Chair Alarm; Bed Alarm; Fall Risk;Pain   Home Living   Type of Home Apartment; Other (Comment)  ([address] [facility])   Home Layout One level   [place]; Other (Comment)  (rollator)   Prior Function   Level of [place] Independent with ADLs; Independent with functional mobility; Needs assistance with [address] [place] [address] Rd staff; Alone   Receives Help From Family;Friend(s); Personal care attendant   IADLs Family/Friend/Other provides transportation; Family/Friend/Other provides meals; Family/Friend/Other provides medication management   Falls in the last 6 months 1 to 4   Vocational Retired   Lifestyle   Autonomy I adls and mobility with rollator -reports assist 2x/wk for showers - meals/homemaking provided   Reciprocal Relationships supportive family and facility staff   Service to Others retired   Intrinsic Gratification mostly sedentary   [address] offers no c/o   ADL   Eating Assistance 5  Supervision/Setup   Grooming Assistance 5  [address]  4  Minimal Assistance   Bed Mobility   Supine to Sit 4  Minimal assistance   Transfers   Sit to Stand 4  Minimal assistance   Stand to Sit 4  Minimal assistance   Functional Mobility   Functional Mobility 4  Minimal assistance   Additional items Rolling walker   Balance   Static Sitting Fair   Dynamic Sitting [address] -   Dynamic Standing Poor +   Ambulatory Poor   Activity Tolerance   Activity Tolerance Patient limited by fatigue;Patient limited by pain   Medical Staff Made Aware Cris Yepez DPT present for coeval 2* medical complexity, comorbidites and limited overall tolerance to activity   RUE Assessment   RUE Assessment WFL   LUE Assessment   LUE Assessment X  (NT proximally 2* pain - WFL elbow, wrist and hand)   Cognition   Overall Cognitive Status WFL   Assessment   Limitation Decreased ADL status; Decreased UE ROM; Decreased endurance;Decreased self-care trans;Decreased high-level ADLs   Prognosis Good   Assessment Pt is a 80 y.o. female who was admitted to [address] [place] [address] on 9/22/2023 with Traumatic hematoma of left shoulder s/p fall.  Patient  has a past medical history of Anemia, Anxiety disorder, unspecified, Anxiety disorder, unspecified, Cardiac arrest, cause unspecified ([address]), Chronic obstructive pulmonary disease, unspecified ([address]), Constipation, Essential (primary) hypertension, GERD (gastroesophageal reflux disease), Hypo-osmolality and hyponatremia, Hypothyroidism, Nontraumatic hematoma of soft tissue, Other seizures ([address]), Persistent atrial fibrillation ([address]), and Unspecified urinary incontinence. At baseline pt was completing adls and mobility independently - has assist for showers and homemaking. Pt lives at [facility]. Currently pt requires min assist for overall ADLS and min assist for functional mobility/transfers. Pt currently presents with impairments in the following categories -limited home support, difficulty performing ADLS and environment activity tolerance, endurance, standing balance/tolerance and UE ROM. These impairments, as well as pt's fatigue, pain, decreased caregiver support, risk for falls and home environment  limit pt's ability to safely engage in all baseline areas of occupation, includingbathing, dressing, toileting, functional mobility/transfers, community mobility, social participation  and leisure activities  From OT standpoint, recommend post acute rehab unless facility can provide increased support upon D/C. OT will continue to follow to address the below stated goals. Goals   Patient Goals go home   LTG Time Frame 10-14   Long Term Goal #1 refer to established goals below   Plan   Treatment Interventions ADL retraining;Functional transfer training;UE strengthening/ROM; Endurance training;Patient/family training;Equipment evaluation/education; Compensatory technique education; Activityengagement   Goal Expiration Date 10/06/23   OT Frequency 2-3x/wk   Recommendation   OT Discharge Recommendation Post acute rehabilitation services   Additional Comments  unless facility is able to provide increased support post d/c   AM-PAC Daily Activity Inpatient   Lower Body Dressing 3   Bathing 3   Toileting 3   Upper Body Dressing 3   Grooming 3   Eating 4   Daily Activity Raw Score 19   Daily Activity Standardized Score (Calc for Raw Score >=11) 40.22   AM-PAC Applied Cognition Inpatient   Following a Speech/Presentation 4   Understanding Ordinary Conversation 4   Taking Medications 4   Remembering Where Things Are Placed or Put Away 3   Remembering List of 4-5 Errands 3   Taking Care of Complicated Tasks 3   Applied Cognition Raw Score 21   Applied Cognition Standardized Score 44.3   End of Consult   Education Provided Yes   Patient Position at End of Consult Supine; All needs within reach   Nurse Communication Nurse aware of consult       OCCUPATIONAL THERAPY GOALS:    *Mod I adls after setup with use of AE PRN  *Mod I toileting and clothing management   *Mod I functional mobility and transfers to/from all surfaces with good dynamic balance and safety for participation in dynamic adls and iadl tasks   *Demonstrate good carryover with safe use of RW during functional tasks   *Assess DME needs   *Increase activity tolerance to 35-40 minutes for participation in adls and enjoyable activities  *Assist with safe d/c recommendations         [employer]

## 2023-09-22 NOTE — CONSULTS
Consultation - Geriatric Medicine   Milan Posey DAYBREAK OF SAMMIE 80 y.o. female MRN: [id number]  Unit/Bed#: ED 18 Encounter: [id number]      Assessment/Plan     Acute metabolic encephalopathy  -Evidenced by confusion and fluctuations in mentation groggy at times, some delay with response to questions and intermittent inappropriate/incorrect responses not present at baseline  -Likely multifactorial including fall with acute traumatic injury, pain, multiple setting changes (facility to [facility] ED then transferred to [facility]), polypharmacy with deliriogenic medication (Benadryl), acute pain and minimal sleep last night   -CTH on admit reports no acute intracranial abnormality  -Patient unreliable historian at time of evaluation however per documentation on initial presentation to outside hosp family reported recent urinary frequency, consider checking UA to rule out UTI  -Ensure that acute pain is well controlled  -Encourage normal circadian rhythm   -Continue to address acute metabolic derangements as arise  -If agitated behaviors develop consider low-dose Zyprexa 2.5 Mg with very close monitoring of QTc (444 msec on admit)    Ambulatory dysfunction with fall  -Reportedly mechanical fall at long-term facility last evening  -(-) head strike (-) loss of consciousness  -In setting of chronic systemic anticoagulant with Coumadin  -injuries as outlined below  -Requires use of walker for ambulation at baseline  -hx recurrent falls numerous over past six months per patient  -Remains high risk future falls due to age, hx fall, deconditioning/debility and unfamiliar environment   -encourage good body mechanics and assist with all transfers  -keep personal items and call bell close to prevent reaching  -maintain environment free of fall hazards  -encourage appropriate footwear and adequate lighting at all times when out of bed  -PT and OT pending    Traumatic hematoma of left shoulder with associated laceration  -S/p fall as noted above  -S/p laceration closure and compression wrap application in ED  -Trend hemoglobin  -Continue multimodal acute pain control  -Neurovascular checks protocol  -Continue local wound care    Bilateral gluteal muscle pain  -Patient reports new since falling  -Consider imaging studies to rule out acute traumatic injury    Acute pain due to trauma  -Recommend pain control per Geriatric pain protocol:  Tylenol 975mg Q8H scheduled  Roxicodone 2.5mg Q4H PRN moderate pain  Roxicodone 5mg Q4H PRN severe pain  Dilaudid 0.2mg Q4H PRN  -Consider adjuncts such as lidocaine patch topically to appropriate areas, do not apply over areas of broken skin or lacerations  -encourage addition of non-pharmacologic pain treatment including ice and frequent repositioning  -recommend  bowel regimen to prevent and treat constipation due to increased risk with acute pain and opiate pain medications    Coagulopathy due to Coumadin  -INR 2.32 overnight, 1.3 this morning following vitamin K and [place] administration  -Coumadin remains on hold due to acute traumatic hematoma with concern for ongoing blood loss  -Maintained on Coumadin chronically for A-fib, resume when medically appropriate    Seizure disorder  -Maintained on lamotrigine chronically as patient  -Continue seizure precautions and avoid/minimize use of medications known to lower seizure threshold  -outpatient follow-up with Neuro for ongoing medication monitoring dose titration and management    Anxiety with depression  -Symptoms reportedly well controlled at baseline with home sertraline regimen, continue home regimen with close monitoring of electrolytes due to history of hypoosmolar hyponatremia  -Encourage patient remain socially and cognitively active and engaged to prevent isolation and worsening symptoms  -Could consider outpatient referral to counseling/support group as part of comprehensive multimodal treatment approach if patient were interested  -Continue psychosocial supports Cognitive screening  -Oriented to self and situation at time of eval with fluctuations of mentation and some confusion consistent with acute metabolic encephalopathy  -At baseline patient fully oriented and independent with ADLs and IADLs aside from medication management which living facility manages  -No prior cognitive testing on record for review  -[facility] 9/22/23 images personally viewed, moderate to severe diffuse chronic microtraumatic changes appreciated  -Significant cardiovascular history including reported remote history of cardiac arrest and findings on CTH consistent with significant cerebrovascular disease increase patient's risk of developing vascular dementia, continue secondary risk factor modifications including optimization of hemodynamics, good control blood of lipids and good control chronic medical conditions as well as continue to be physically and cognitively active  -No recent TSH or B12 on record review, consider checking with routine labs  -Consider comprehensive geriatric assessment with cognitive testing as outpatient following recovery from acute injuries    Hx of cardiac arrest  -per SNF documentation remote history of cardiac arrest  -Continue secondary risk factor modifications    COPD  -Symptoms appear to be well controlled with home inhaler regimen including Advair, ProAir Respiclick and albuterol nebs  -Not maintained on any supplementation O2 at baseline  -Increases risk acute respiratory decompensation/respiratory failure, monitor respiratory status closely    Hypothyroidism  -No TSH on record review, recommend checking  -Continue home levothyroxine regimen, dose adjustment pending TSH result  -Continue close outpatient follow-up with PCP for ongoing dose titration and medication management    Impaired Vision  -recommend use of corrective lenses at all appropriate times  -encourage adequate lighting and encourage use of assistance with ambulation  -keep personal belongings close to person to avoid reaching  -encourage appropriate footwear at all times  -Consider large font for printed materials provided to patient    Impaired mastication  -Recently fitted for upper plate -encourage use at all appropriate times  -ensure meal consistency appropriate for abilities  -continue aspiration precautions    Urinary incontinence  -Reportedly controlled with oxybutynin at baseline  -Consider long-term goal tapering of oxybutynin due to increased risk confusion and falls in older population, consider instead timed voiding, limiting fluid intake within 2 hours of bed and outpatient follow-up with Urology   -Monitor for fecal and urinary retention due to increased risk with use    Deconditioning/debility/frailty  -clinical frailty scale stage V/VI, moderately frail  -Multifactorial due to age, seizure disorder and multitude of additional chronic medical comorbidities now with fall and acute traumatic injury superimposed in elderly individual with extremely limited physiologic and metabolic reserve increasing sensitivity to even seemingly mild additional metabolic derangements or stressors  -Continue to optimize chronic medical conditions and address acute derangements as arise  -Ensure underlying anxiety/mood/depression symptoms are well controlled as may impact patient response to therapies as well as overall sense of wellbeing and quality of life  -Ensure that treatments and interventions align with patient's wishes and goals of care  -Continue psychosocial supports    Home medication review  Personally obtained list from [employer]  Marc:    Absorbine topical pain relief Q6H PRN  Albuterol neb BID PRN  Anbesol 1 application QID PRN  Tylenol 650 Mg Q8H PRN   AND  Tylenol 500 Mg BID scheduled  Atorvastatin 80 Mg daily  Bisacodyl rectal suppository daily as needed  Calcium-vitamin D 600-400 Mg daily  Multivitamin daily  Diltiazem 180 mg daily  Enema daily as needed  Fluticasone-salmeterol 100-50 mcg 1 puff twice daily  Furosemide 40 Mg daily  Lactase dairy relief with dairy products as needed   Lamotrigine 100 Mg twice daily (seizure disorder)   Levothyroxine 125 mcg daily  Lidocaine 4% topical patch as needed  Loperamide 2 Mg. Milk of mag as needed  Montelukast 10 Mg daily  Guaifenesin 600 Mg Q12H  Omeprazole 20 Mg daily  Oxybutynin 5 Mg twice daily  MiraLAX as needed  ProAir Respiclick every 4 as needed  Senna 8.6 mg as needed  Sertraline 100 Mg at bedtime  Tussin cough syrup as needed  Vitamin C 500 Mg daily  Coumadin 1.25 Mg 3 times weekly (Friday, Saturday, Sunday)  Coumadin 2.5 Mg 4 times weekly (Monday, Tuesday, Wednesday, Thursday)    Amoxicillin -standing order for preprocedural antibiotic     Facility administers all of patients medications except inhalers which she self administers     History of Present Illness   Physician Requesting Consult: Bonnie Mcclendon DO  Reason for Consult / Principal Problem: Fall  Hx and PE limited by: N/A  Additional history obtained from: Chart review and patient evaluation, personally spoke with patients care staff at patient's assisted living facility [address] [place] [address] for additional history and confirmation of home medication regimen    HPI: Reji Murphy is a 80y.o. year old female with epilepsy, hyperlipidemia, persistent atrial fibrillation, hypothyroidism, hypertension, and amatory function was admitted to trauma service with fall found to have traumatic hematoma of left shoulder, she is being seen in consultation by Geriatrics for high risk developing delirium during hospitalization. She is seen and examined at bedside in the ED where she is lying resting, she is able to state that she sustained a fall last night striking her left shoulder and buttox but is confused on some of the details stating that it was at her nephews house when it was reportedly in her room at her assisted living facility.  She denies head strike or loss of consciousness but is not a completely reliable historian and thus additional information obtained personally speaking with patient's care staff at her care facility. They spent that she is mostly dependent with ADLs and IADLs except for medication management and is usually fully oriented calm and cooperative. She utilizes a walker/rollator for ambulation at baseline which she was not reportedly utilizing at the time of the fall last evening. She has history of recurrent falls multiple in the past 6 months per patient. She requires use of glasses and has recently been fitted for upper dentures, she requires use of hearing aid. Inpatient consult to Gerontology  Consult performed by: Shanita Mcdaniel DO  Consult ordered by: Britany Sanchez MD        Review of Systems   Constitutional: Negative. Negative for chills and fever. HENT: Positive for dental problem (Upper denture). Eyes: Positive for visual disturbance (Glasses for reading). Respiratory: Negative. Negative for shortness of breath. Cardiovascular: Negative. Negative for leg swelling. Gastrointestinal: Negative. Genitourinary: Negative. Musculoskeletal: Positive for gait problem. Pain in left shoulder and gluteal area bilaterally   Skin: Negative. Neurological: Negative for dizziness, syncope, weakness, light-headedness, numbness and headaches. Hematological: Bruises/bleeds easily. Psychiatric/Behavioral: Negative. All other systems reviewed and are negative.     Historical Information   Past Medical History:   Diagnosis Date   • Anemia    • Anxiety disorder, unspecified    • Anxiety disorder, unspecified    • Cardiac arrest, cause unspecified ([address])    • Chronic obstructive pulmonary disease, unspecified ([address])    • Constipation    • Essential (primary) hypertension    • GERD (gastroesophageal reflux disease)    • Hypo-osmolality and hyponatremia    • Hypothyroidism    • Nontraumatic hematoma of soft tissue    • Other seizures ([address]) • Persistent atrial fibrillation (HCC)    • Unspecified urinary incontinence      Past Surgical History:   Procedure Laterality Date   • APPENDECTOMY     • CARDIAC PACEMAKER PLACEMENT     • FEMUR FRACTURE SURGERY       Social History   Social History     Substance and Sexual Activity   Alcohol Use Not Currently     Social History     Substance and Sexual Activity   Drug Use Never     Social History     Tobacco Use   Smoking Status Never   Smokeless Tobacco Never     Family History: Patient unable to provide due to confusion/encephalopathy not currently reliable historian, no family bedside to provide info    Meds/Allergies   all current active meds have been reviewed    Allergies   Allergen Reactions   • Erythromycin Photosensitivity   • Shellfish-Derived Products - Food Allergy Hives   • Ephedrine Rash   • Iodinated Contrast Media Rash   • Latex Rash   • Nsaids Rash   • Povidone Iodine Rash   • Salicylates Rash   • Shrimp (Diagnostic) - Food Allergy Rash   • Sympathomimetics Rash   • Tiotropium Rash   • Tositumomab Rash     Objective     Intake/Output Summary (Last 24 hours) at 9/22/2023 0808  Last data filed at 9/22/2023 0722  Gross per 24 hour   Intake 50 ml   Output --   Net 50 ml     Invasive Devices     Peripheral Intravenous Line  Duration           Peripheral IV 09/22/23 Dorsal (posterior); Right Hand <1 day    Peripheral IV 09/22/23 Right;Upper;Ventral (anterior) Arm <1 day              Physical Exam  Vitals and nursing note reviewed. Constitutional:       General: She is not in acute distress. Comments: Frail elderly female appears stated age   HENT:      Head: Normocephalic. Nose: Nose normal.      Mouth/Throat:      Mouth: Mucous membranes are dry. Eyes:      General:         Right eye: No discharge. Left eye: No discharge. Neck:      Comments: Trachea midline, phonation normal  Cardiovascular:      Rate and Rhythm: Normal rate. Pulses: Normal pulses.       Heart sounds: Murmur heard. Pulmonary:      Effort: Pulmonary effort is normal. No respiratory distress. Breath sounds: No wheezing. Abdominal:      General: Bowel sounds are normal. There is no distension. Palpations: Abdomen is soft. Tenderness: There is no abdominal tenderness. Musculoskeletal:      Cervical back: Neck supple. Right lower leg: No edema. Left lower leg: No edema. Comments: Reduced overall muscle mass    Left shoulder wrapped with copious amounts of packing and Ace wrap   Skin:     General: Skin is warm and dry. Comments: Thin and very friable    Chronic venous stasis changes bilateral lower extremities   Neurological:      Mental Status: She is alert.       Comments: Awake and alert, oriented to self place and situation, some inappropriate responses to questions asked with intermittent delayed response   Psychiatric:      Comments: Restless        Lab Results:     I have personally reviewed pertinent lab results including the followin/22/23 -CBC, CMP, INR    I have personally reviewed the following imaging study reports in PACS:    23-portable CXR, CT C-spine without contrast, CT head without contrast, left shoulder XR, left wrist XR    Therapies:   PT: Pending  OT: Pending    VTE Prophylaxis: SCDs    Code Status: Level 1 - Full Code  Advance Directive and Living Will: Yes    Power of : Yes  POLST:      Family and Social Support: Assisted living facility staff    Goals of Care: pain control

## 2023-09-22 NOTE — ED PROVIDER NOTES
Emergency Department Trauma Note  Ti Velazco 80 y.o. female MRN: [id number]  Unit/Bed#: ED 12/ED 12 Encounter: [id number]      Trauma Alert: Trauma Acuity: Trauma Evaluation  Model of Arrival: Mode of Arrival: BLS via Trauma Squad Name and Number: [id number]  Trauma Team: Current Providers  Attending Provider: Ángel Davis DO  Registered Nurse: Ata Ave, RN  Consultants:     None      History of Present Illness     Chief Complaint:   Chief Complaint   Patient presents with   • Trauma     Pt presents from home with c/o trip and fall. Laceration to the left shoulder, no LOC +warfarin. HPI:  iT Velazco is a 80 y.o. female who presents with fall. Mechanism:Details of Incident: fall from [facility] when returnin lipstick to her bureau Injury Date: 09/22/23 Injury Time: 0006 Injury Occurence Location - [address] [place] [address],[address]    22-year-old female presents for evaluation status post fall, was going to put her lipstick on [employer] when she lost balance and fell landing on her left side, has a laceration to left shoulder as well as abrasion to left elbow and left wrist.  Has pain with movement of the shoulder. Does not think she struck her head and has no external head trauma. Does take Coumadin for blood thinning medication for  history of atrial fibrillation. also states that she has been having urinary frequency for which her daughter is making appointment with her PCP. Of  Note patient is supposed to ambulate with her walker however was not using her walker this evening when she fell. Tetanus last updated in 2022        Review of Systems   Constitutional: Negative for appetite change and fever. HENT: Negative for rhinorrhea and sore throat. Eyes: Negative for photophobia and visual disturbance. Respiratory: Negative for cough, chest tightness and wheezing. Cardiovascular: Negative for chest pain, palpitations and leg swelling.    Gastrointestinal: Negative for abdominal distention, abdominal pain, blood in stool, constipation and diarrhea. Genitourinary: Positive for frequency. Negative for difficulty urinating, dysuria, flank pain, hematuria and urgency. Musculoskeletal: Negative for back pain. Left shoulder pain  Laceration  abrasions   Skin: Negative for rash. Neurological: Negative for dizziness, weakness and headaches. All other systems reviewed and are negative. Historical Information     Immunizations: There is no immunization history on file for this patient. Past Medical History:   Diagnosis Date   • Anemia    • Anxiety disorder, unspecified    • Anxiety disorder, unspecified    • Cardiac arrest, cause unspecified ([address])    • Chronic obstructive pulmonary disease, unspecified ([address])    • Constipation    • Essential (primary) hypertension    • GERD (gastroesophageal reflux disease)    • Hypo-osmolality and hyponatremia    • Hypothyroidism    • Nontraumatic hematoma of soft tissue    • Other seizures (HCC)    • Persistent atrial fibrillation (HCC)    • Unspecified urinary incontinence      History reviewed. No pertinent family history. Past Surgical History:   Procedure Laterality Date   • APPENDECTOMY     • CARDIAC PACEMAKER PLACEMENT     • FEMUR FRACTURE SURGERY       Social History     Tobacco Use   • Smoking status: Never   • Smokeless tobacco: Never   Vaping Use   • Vaping Use: Never used   Substance Use Topics   • Alcohol use: Not Currently   • Drug use: Never     E-Cigarette/Vaping   • E-Cigarette Use Never User      E-Cigarette/Vaping Substances       Family History: non-contributory    Meds/Allergies   Prior to Admission Medications   Prescriptions Last Dose Informant Patient Reported? Taking? Fluticasone-Salmeterol (Advair) 100-50 mcg/dose inhaler   Yes No   Sig: Inhale 1 puff 2 (two) times a day Rinse mouth after use.    Lidocaine 4 % PTCH   Yes No   Sig: Apply 1 patch topically daily at bedtime On for 12 hrs, off for 12 hrs Multiple Vitamin (multivitamin) tablet   Yes No   Sig: Take 1 tablet by mouth daily   acetaminophen (TYLENOL) 325 mg tablet   Yes No   Sig: Take 650 mg by mouth every 8 (eight) hours as needed for mild pain   acetaminophen (TYLENOL) 500 mg tablet   Yes No   Sig: Take 500 mg by mouth every 12 (twelve) hours as needed for mild pain   albuterol (PROVENTIL HFA,VENTOLIN HFA) 90 mcg/act inhaler   Yes No   Sig: Inhale 2 puffs every 6 (six) hours as needed for wheezing   ascorbic acid (VITAMIN C) 500 MG tablet   Yes No   Sig: Take 500 mg by mouth daily   atorvastatin (LIPITOR) 80 mg tablet   Yes No   Sig: Take 80 mg by mouth daily   calcium carbonate (OS-JERARDO) 600 MG tablet   Yes No   Sig: Take 600 mg by mouth daily   cephalexin (KEFLEX) 500 mg capsule   Yes No   Sig: Take 500 mg by mouth every 6 (six) hours   diltiazem (DILACOR XR) 180 MG 24 hr capsule   Yes No   Sig: Take 180 mg by mouth daily   furosemide (LASIX) 40 mg tablet   Yes No   Sig: Take 40 mg by mouth daily   lactase (LACTAID) 3,000 units tablet   Yes No   Sig: Take 9,000 Units by mouth 3 (three) times a day as needed   lamoTRIgine (LaMICtal) 100 mg tablet   Yes No   Sig: Take 100 mg by mouth 2 (two) times a day   levothyroxine 125 mcg tablet   Yes No   Sig: Take 125 mcg by mouth daily   montelukast (SINGULAIR) 10 mg tablet   Yes No   Sig: Take 10 mg by mouth daily at bedtime   omeprazole (PriLOSEC) 20 mg delayed release capsule   Yes No   Sig: Take 20 mg by mouth daily   oxybutynin (DITROPAN) 5 mg tablet   Yes No   Sig: Take 5 mg by mouth 3 (three) times a day   senna (SENOKOT) 8.6 MG tablet   Yes No   Sig: Take 1 tablet by mouth daily as needed for constipation   sertraline (ZOLOFT) 100 mg tablet   Yes No   Sig: Take 100 mg by mouth daily   warfarin (COUMADIN) 2.5 mg tablet   Yes No   Sig: Take by mouth daily   warfarin (COUMADIN) 2.5 mg tablet   Yes No   Sig: Take 1.25 mg by mouth 2 (two) times a week      Facility-Administered Medications: None Allergies   Allergen Reactions   • Erythromycin Photosensitivity   • Shellfish-Derived Products - Food Allergy Hives   • Ephedrine Rash   • Iodinated Contrast Media Rash   • Latex Rash   • Nsaids Rash   • Povidone Iodine Rash   • Salicylates Rash   • Shrimp (Diagnostic) - Food Allergy Rash   • Sympathomimetics Rash   • Tiotropium Rash   • Tositumomab Rash       PHYSICAL EXAM    PE limited by: none    Objective   Vitals:   First set: Temperature: 98.8 °F (37.1 °C) (09/22/23 0047)  Pulse: 91 (09/22/23 0047)  Respirations: 19 (09/22/23 0047)  Blood Pressure: 168/88 (09/22/23 0047)  SpO2: 97 % (09/22/23 0047)    Primary Survey:   (A) Airway: intact  (B) Breathing: cta b/l  (C) Circulation: Pulses:   normal  (D) Disabliity:  GCS Total:  15  (E) Expose:  Completed    Secondary Survey: (Click on Physical Exam tab above)  Physical Exam  Vitals and nursing note reviewed. Constitutional:       Appearance: She is well-developed. HENT:      Head: Normocephalic and atraumatic. Eyes:      Pupils: Pupils are equal, round, and reactive to light. Neck:      Comments: Immobilized in cervical collar, no midline tenderness  Cardiovascular:      Rate and Rhythm: Rhythm irregular. Heart sounds: No murmur heard. No friction rub. No gallop. Comments: Pacemaker left chest wall, no overlying signs of infection  Pulmonary:      Effort: Pulmonary effort is normal.      Breath sounds: No wheezing or rales. Chest:      Chest wall: No tenderness. Abdominal:      General: There is no distension. Palpations: Abdomen is soft. There is no mass. Tenderness: There is no guarding or rebound.    Musculoskeletal:      Comments: Laceration, , abrasion , 4 cm to left posterior shoulder , no active bleeding, no deformity    Pain with ROM of shoulder w/ flexion above 90 degrees    Abrasion to left elbow and left wrist with full intact ROM of Left elbow and Left wrist     Otherwise neurovascularly intact   Skin: General: Skin is warm and dry. Neurological:      General: No focal deficit present. Mental Status: She is alert and oriented to person, place, and time. Mental status is at baseline. Cervical spine cleared by clinical criteria? No (imaging required)      Invasive Devices     Peripheral Intravenous Line  Duration           Peripheral IV 09/22/23 Dorsal (posterior); Right Hand <1 day                Lab Results:   Results Reviewed     Procedure Component Value Units Date/Time    APTT [[id number]]  (Normal) Collected: 09/22/23 0225    Lab Status: Final result Specimen: Blood from Arm, Right Updated: 09/22/23 0256     PTT 36 seconds     Protime-INR [[id number]]  (Abnormal) Collected: 09/22/23 0225    Lab Status: Final result Specimen: Blood from Arm, Right Updated: 09/22/23 0256     Protime 26.7 seconds      INR 2.32    HS Troponin I 2hr [[id number]]     Lab Status: No result Specimen: Blood     HS Troponin 0hr (reflex protocol) [[id number]]  (Normal) Collected: 09/22/23 0225    Lab Status: Final result Specimen: Blood from Arm, Right Updated: 09/22/23 0254     hs TnI 0hr 16 ng/L     Comprehensive metabolic panel [[id number]]  (Abnormal) Collected: 09/22/23 0225    Lab Status: Final result Specimen: Blood from Arm, Right Updated: 09/22/23 0251     Sodium 135 mmol/L      Potassium 3.6 mmol/L      Chloride 94 mmol/L      CO2 32 mmol/L      ANION GAP 9 mmol/L      BUN 16 mg/dL      Creatinine 0.68 mg/dL      Glucose 121 mg/dL      Calcium 9.9 mg/dL      AST 20 U/L      ALT 17 U/L      Alkaline Phosphatase 90 U/L      Total Protein 6.9 g/dL      Albumin 4.4 g/dL      Total Bilirubin 0.56 mg/dL      eGFR 81 ml/min/1.73sq m     Narrative:      [place] guidelines for Chronic Kidney Disease (CKD):   •  Stage 1 with normal or high GFR (GFR > 90 mL/min/1.73 square meters)  •  Stage 2 Mild CKD (GFR = 60-89 mL/min/1.73 square meters)  •  Stage 3A Moderate CKD (GFR = 45-59 mL/min/1.73 square meters)  •  Stage 3B Moderate CKD (GFR = 30-44 mL/min/1.73 square meters)  •  Stage 4 Severe CKD (GFR = 15-29 mL/min/1.73 square meters)  •  Stage 5 End Stage CKD (GFR <15 mL/min/1.73 square meters)  Note: GFR calculation is accurate only with a steady state creatinine    CBC and differential [[id number]]  (Abnormal) Collected: 09/22/23 0100    Lab Status: Final result Specimen: Blood from Arm, Right Updated: 09/22/23 0114     WBC 8.49 Thousand/uL      RBC 4.45 Million/uL      Hemoglobin 13.1 g/dL      Hematocrit 41.0 %      MCV 92 fL      MCH 29.4 pg      MCHC 32.0 g/dL      RDW 14.8 %      MPV 9.7 fL      Platelets 911 Thousands/uL      nRBC 0 /100 WBCs      Neutrophils Relative 74 %      Immat GRANS % 2 %      Lymphocytes Relative 11 %      Monocytes Relative 10 %      Eosinophils Relative 2 %      Basophils Relative 1 %      Neutrophils Absolute 6.27 Thousands/µL      Immature Grans Absolute 0.14 Thousand/uL      Lymphocytes Absolute 0.97 Thousands/µL      Monocytes Absolute 0.83 Thousand/µL      Eosinophils Absolute 0.20 Thousand/µL      Basophils Absolute 0.08 Thousands/µL     UA w Reflex to Microscopic w Reflex to Culture [[id number]]     Lab Status: No result Specimen: Urine                  Imaging Studies:   Direct to CT: Yes  XR wrist 3+ views LEFT   ED Interpretation by Toney Heredia DO (09/22 0308)   This study was ordered and independently reviewed by me    No acute findings noted         XR shoulder 2+ views LEFT   Final Result by Delmis Randhawa MD (09/22 0217)      No acute osseous abnormality. Workstation performed: [id number]         TRAUMA - CT head wo contrast   Final Result by Delmis Randhawa MD (09/22 0148)      No intracranial hemorrhage or calvarial fracture. Suspect acute left sphenoid sinus disease. Correlate clinically.             Workstation performed: [id number]         TRAUMA - CT spine cervical wo contrast   Final Result by Delmis Randhawa MD (09/22 1393)      No cervical spine fracture or traumatic malalignment. Stable CT C-spine compared to prior            Workstation performed: [id number]         XR Trauma chest portable   Final Result by Reymundo Schumacher MD (09/22 0201)      No acute cardiopulmonary disease. Workstation performed: [id number]         CT upper extremity w contrast left    (Results Pending)         Procedures  Universal Protocol:  Consent: Verbal consent obtained. Consent given by: patient  Patient identity confirmed: verbally with patient    Laceration repair    Date/Time: 9/22/2023 2:30 AM    Performed by: James Kiran DO  Authorized by: James Kiran DO  Body area: upper extremity  Location details: left shoulder  Laceration length: 4 cm  Tendon involvement: none  Nerve involvement: none  Anesthesia: local infiltration    Anesthesia:  Local Anesthetic: lidocaine 1% with epinephrine  Anesthetic total: 10 mL    Sedation:  Patient sedated: no      Wound Dehiscence:    Secondary closure or dehiscence: complex    Procedure Details:  Irrigation solution: saline  Irrigation method: syringe and jet lavage  Amount of cleaning: extensive  Debridement: minimal  Degree of undermining: minimal  Skin closure: 3-0 nylon  Subcutaneous closure: 3-0 Vicryl  Fascia closure: 3-0 Vicryl  Number of sutures: 6 superficial sutures and 3 subcutaneous.   Technique: simple  Approximation: close  Approximation difficulty: simple  Dressing: pressure dressing  Patient tolerance: patient tolerated the procedure well with no immediate complications  Comments: Significant hematoma to left posterior shoulder not currently expanding no active bleeding               ED Course  ED Course as of 09/22/23 0347   Fri Sep 22, 2023   0055 Procedure Note: EKG  Date/Time: 09/22/23 12:55 AM   Performed by: James Kiran  Authorized by: James Kiran  Indications / Diagnosis: Fall  ECG reviewed by me, the ED Provider: yes   The EKG demonstrates:  Rhythm: afib   Intervals: normal intervals  Axis: normal axis  QRS/Blocks: normal QRS  ST Changes: No acute ST Changes, no STD/RICHARD.       0123 There is significant amount of bleeding from left shoulder wound wound rewrapped with pressure dressing, neurovascular intact distally   0145 Slow venous oozing from left shoulder wound, with posterior hematoma, direct pressure placed at site of bleeding for hemostasis. On radiology review no acute fracture. Neurovascularly intact distally   0222 Initially CT scan with contrast deferred as patient did not have any active bleeding of her left upper extremity wound and has a documented history of contrast allergy, following this patient did have continued bleeding and developing hematoma of left shoulder wound, no active arterial bleed was able to be seen on direct examination however due to continued hematoma several stitches were placed in the wound with presumed hemostasis, will obtain CT imaging to further evaluate the region. Chart review it appears that patient did have CT with contrast of left lower extremity in March of this year with no allergic reaction she does not remember having an allergic reaction to contrast, will order scan and observe closely   0225 Cervical collar cleared   0300 My review of the CT of the upper extremity showing several foci of what appears to be acute bleeding in the hematoma, clinically patient is well-appearing no active bleeding, pressure dressing placed hematoma appears to be stable   0302 Will contact trauma, awaiting official radiology read which was requested as stat   0304 SpO2: 91 %  Patient generally on BiPAP while asleep   0314  patient with INR of 2.2, compressible site, will hold off on reversing anticoagulation, after discussion with trauma surgery will transfer to [address]  80year old female with fall. Imaging to evaluate for fractures, intracranial bleeding.   Likely fall due to chronic balance issues as she was not using her walker, will obtain EKG to evaluate for arrhythmia, ST changes, lab work to evaluate for electrolyte abnormalities, dehydration UA to evaluate for urinary tract infection, INR to evaluate for Coumadin levels    Amount and/or Complexity of Data Reviewed  Labs: ordered. Radiology: ordered.                   Disposition  [facility] Transfer: No  Final diagnoses:   Traumatic hematoma of left shoulder, initial encounter   Laceration of left shoulder, initial encounter   Elevated INR     Time reflects when diagnosis was documented in both MDM as applicable and the Disposition within this note     Time User Action Codes Description Comment    9/22/2023  3:12 AM Carmella Gabrielle Add [S40.012A] Traumatic hematoma of left shoulder, initial encounter     9/22/2023  3:13 AM Carmella Gabrielle Add [S41.012A] Laceration of left shoulder, initial encounter     9/22/2023  3:13 AM Carmella Gabrielle Add [R79.1] Elevated INR       ED Disposition     ED Disposition   Transfer to Another Facility-In Network    Condition   --    Date/Time   Fri Sep 22, 2023  3:13 AM    Comment   Sean Muhammad should be transferred out to [facility],  .           MD Documentation    Ian Verdugo Most Recent Value   Patient Condition The patient has been stabilized such that within reasonable medical probability, no material deterioration of the patient condition or the condition of the unborn child(trudy) is likely to result from the transfer   Reason for Transfer Level of Care needed not available at this facility   Benefits of Transfer Specialized equipment and/or services available at the receiving facility (Include comment)________________________, Continuity of care   Risks of Transfer Potential deterioration of medical condition, Potential for delay in receiving treatment, Loss of IV, Increased discomfort during transfer, Possible worsening of condition or death during transfer   Accepting Physician Dr. Jose Herrera Name, Clive Cabral MD, Dr. Surekha   Provider Certification General risk, such as traffic hazards, adverse weather conditions, rough terrain or turbulence, possible failure of equipment (including vehicle or aircraft), or consequences of actions of persons outside the control of the transport personnel      RN Documentation    [address] Name, [facility]   Bed Assignment ED   Transport Mode Ambulance   Level of Care Basic life support      Follow-up Information    None       Patient's Medications   Discharge Prescriptions    No medications on file     No discharge procedures on file.     PDMP Review     None          ED Provider  Electronically Signed by         Shima Rivas DO  09/22/23 5419

## 2023-09-23 LAB
2HR DELTA HS TROPONIN: 2 NG/L
ANION GAP SERPL CALCULATED.3IONS-SCNC: 4 MMOL/L
ANION GAP SERPL CALCULATED.3IONS-SCNC: 9 MMOL/L
BASOPHILS # BLD AUTO: 0.08 THOUSANDS/ÂΜL (ref 0–0.1)
BASOPHILS NFR BLD AUTO: 1 % (ref 0–1)
BUN SERPL-MCNC: 12 MG/DL (ref 5–25)
BUN SERPL-MCNC: 9 MG/DL (ref 5–25)
CALCIUM SERPL-MCNC: 8.1 MG/DL (ref 8.4–10.2)
CALCIUM SERPL-MCNC: 8.7 MG/DL (ref 8.4–10.2)
CARDIAC TROPONIN I PNL SERPL HS: 15 NG/L
CARDIAC TROPONIN I PNL SERPL HS: 17 NG/L
CHLORIDE SERPL-SCNC: 93 MMOL/L (ref 96–108)
CHLORIDE SERPL-SCNC: 94 MMOL/L (ref 96–108)
CO2 SERPL-SCNC: 30 MMOL/L (ref 21–32)
CO2 SERPL-SCNC: 35 MMOL/L (ref 21–32)
CREAT SERPL-MCNC: 0.62 MG/DL (ref 0.6–1.3)
CREAT SERPL-MCNC: 0.87 MG/DL (ref 0.6–1.3)
EOSINOPHIL # BLD AUTO: 0.16 THOUSAND/ÂΜL (ref 0–0.61)
EOSINOPHIL NFR BLD AUTO: 1 % (ref 0–6)
ERYTHROCYTE [DISTWIDTH] IN BLOOD BY AUTOMATED COUNT: 14.9 % (ref 11.6–15.1)
ERYTHROCYTE [DISTWIDTH] IN BLOOD BY AUTOMATED COUNT: 15 % (ref 11.6–15.1)
GFR SERPL CREATININE-BSD FRML MDRD: 61 ML/MIN/1.73SQ M
GFR SERPL CREATININE-BSD FRML MDRD: 83 ML/MIN/1.73SQ M
GLUCOSE SERPL-MCNC: 125 MG/DL (ref 65–140)
GLUCOSE SERPL-MCNC: 88 MG/DL (ref 65–140)
HCT VFR BLD AUTO: 29.5 % (ref 34.8–46.1)
HCT VFR BLD AUTO: 33.6 % (ref 34.8–46.1)
HGB BLD-MCNC: 11 G/DL (ref 11.5–15.4)
HGB BLD-MCNC: 9.7 G/DL (ref 11.5–15.4)
IMM GRANULOCYTES # BLD AUTO: 0.06 THOUSAND/UL (ref 0–0.2)
IMM GRANULOCYTES NFR BLD AUTO: 1 % (ref 0–2)
INR PPP: 1.05 (ref 0.84–1.19)
LACTATE SERPL-SCNC: 1.7 MMOL/L (ref 0.5–2)
LYMPHOCYTES # BLD AUTO: 0.81 THOUSANDS/ÂΜL (ref 0.6–4.47)
LYMPHOCYTES NFR BLD AUTO: 7 % (ref 14–44)
MAGNESIUM SERPL-MCNC: 1.2 MG/DL (ref 1.9–2.7)
MCH RBC QN AUTO: 29.9 PG (ref 26.8–34.3)
MCH RBC QN AUTO: 30.1 PG (ref 26.8–34.3)
MCHC RBC AUTO-ENTMCNC: 32.7 G/DL (ref 31.4–37.4)
MCHC RBC AUTO-ENTMCNC: 32.9 G/DL (ref 31.4–37.4)
MCV RBC AUTO: 91 FL (ref 82–98)
MCV RBC AUTO: 92 FL (ref 82–98)
MONOCYTES # BLD AUTO: 1.01 THOUSAND/ÂΜL (ref 0.17–1.22)
MONOCYTES NFR BLD AUTO: 9 % (ref 4–12)
NEUTROPHILS # BLD AUTO: 9.1 THOUSANDS/ÂΜL (ref 1.85–7.62)
NEUTS SEG NFR BLD AUTO: 81 % (ref 43–75)
NRBC BLD AUTO-RTO: 0 /100 WBCS
PHOSPHATE SERPL-MCNC: 3.6 MG/DL (ref 2.3–4.1)
PLATELET # BLD AUTO: 219 THOUSANDS/UL (ref 149–390)
PLATELET # BLD AUTO: 240 THOUSANDS/UL (ref 149–390)
PMV BLD AUTO: 9.8 FL (ref 8.9–12.7)
PMV BLD AUTO: 9.9 FL (ref 8.9–12.7)
POTASSIUM SERPL-SCNC: 3.5 MMOL/L (ref 3.5–5.3)
POTASSIUM SERPL-SCNC: 3.9 MMOL/L (ref 3.5–5.3)
PROTHROMBIN TIME: 13.9 SECONDS (ref 11.6–14.5)
RBC # BLD AUTO: 3.24 MILLION/UL (ref 3.81–5.12)
RBC # BLD AUTO: 3.65 MILLION/UL (ref 3.81–5.12)
SODIUM SERPL-SCNC: 132 MMOL/L (ref 135–147)
SODIUM SERPL-SCNC: 133 MMOL/L (ref 135–147)
WBC # BLD AUTO: 11.22 THOUSAND/UL (ref 4.31–10.16)
WBC # BLD AUTO: 11.49 THOUSAND/UL (ref 4.31–10.16)

## 2023-09-23 PROCEDURE — 85610 PROTHROMBIN TIME: CPT | Performed by: STUDENT IN AN ORGANIZED HEALTH CARE EDUCATION/TRAINING PROGRAM

## 2023-09-23 PROCEDURE — 80048 BASIC METABOLIC PNL TOTAL CA: CPT | Performed by: NURSE PRACTITIONER

## 2023-09-23 PROCEDURE — 85027 COMPLETE CBC AUTOMATED: CPT | Performed by: STUDENT IN AN ORGANIZED HEALTH CARE EDUCATION/TRAINING PROGRAM

## 2023-09-23 PROCEDURE — 99232 SBSQ HOSP IP/OBS MODERATE 35: CPT | Performed by: SURGERY

## 2023-09-23 PROCEDURE — 84484 ASSAY OF TROPONIN QUANT: CPT | Performed by: STUDENT IN AN ORGANIZED HEALTH CARE EDUCATION/TRAINING PROGRAM

## 2023-09-23 PROCEDURE — 83605 ASSAY OF LACTIC ACID: CPT | Performed by: STUDENT IN AN ORGANIZED HEALTH CARE EDUCATION/TRAINING PROGRAM

## 2023-09-23 PROCEDURE — 85025 COMPLETE CBC W/AUTO DIFF WBC: CPT | Performed by: NURSE PRACTITIONER

## 2023-09-23 PROCEDURE — 84100 ASSAY OF PHOSPHORUS: CPT | Performed by: STUDENT IN AN ORGANIZED HEALTH CARE EDUCATION/TRAINING PROGRAM

## 2023-09-23 PROCEDURE — 83735 ASSAY OF MAGNESIUM: CPT | Performed by: STUDENT IN AN ORGANIZED HEALTH CARE EDUCATION/TRAINING PROGRAM

## 2023-09-23 PROCEDURE — 80048 BASIC METABOLIC PNL TOTAL CA: CPT | Performed by: STUDENT IN AN ORGANIZED HEALTH CARE EDUCATION/TRAINING PROGRAM

## 2023-09-23 RX ORDER — WARFARIN SODIUM 2.5 MG/1
2.5 TABLET ORAL
Status: DISCONTINUED | OUTPATIENT
Start: 2023-09-23 | End: 2023-09-29 | Stop reason: HOSPADM

## 2023-09-23 RX ORDER — MAGNESIUM SULFATE HEPTAHYDRATE 40 MG/ML
4 INJECTION, SOLUTION INTRAVENOUS ONCE
Status: COMPLETED | OUTPATIENT
Start: 2023-09-23 | End: 2023-09-23

## 2023-09-23 RX ORDER — POTASSIUM CHLORIDE 20 MEQ/1
40 TABLET, EXTENDED RELEASE ORAL ONCE
Status: COMPLETED | OUTPATIENT
Start: 2023-09-23 | End: 2023-09-23

## 2023-09-23 RX ORDER — DOCUSATE SODIUM 100 MG/1
100 CAPSULE, LIQUID FILLED ORAL 2 TIMES DAILY
Status: DISCONTINUED | OUTPATIENT
Start: 2023-09-23 | End: 2023-09-29 | Stop reason: HOSPADM

## 2023-09-23 RX ORDER — ENOXAPARIN SODIUM 100 MG/ML
1 INJECTION SUBCUTANEOUS EVERY 12 HOURS SCHEDULED
Status: DISCONTINUED | OUTPATIENT
Start: 2023-09-23 | End: 2023-09-29

## 2023-09-23 RX ADMIN — POTASSIUM CHLORIDE 40 MEQ: 1500 TABLET, EXTENDED RELEASE ORAL at 18:20

## 2023-09-23 RX ADMIN — ENOXAPARIN SODIUM 60 MG: 60 INJECTION SUBCUTANEOUS at 20:58

## 2023-09-23 RX ADMIN — LEVOTHYROXINE SODIUM 125 MCG: 125 TABLET ORAL at 05:45

## 2023-09-23 RX ADMIN — MAGNESIUM SULFATE HEPTAHYDRATE 4 G: 40 INJECTION, SOLUTION INTRAVENOUS at 18:20

## 2023-09-23 RX ADMIN — DILTIAZEM HYDROCHLORIDE 90 MG: 90 CAPSULE, EXTENDED RELEASE ORAL at 10:31

## 2023-09-23 RX ADMIN — DOCUSATE SODIUM 100 MG: 100 CAPSULE, LIQUID FILLED ORAL at 18:20

## 2023-09-23 RX ADMIN — PANTOPRAZOLE SODIUM 20 MG: 20 TABLET, DELAYED RELEASE ORAL at 05:45

## 2023-09-23 RX ADMIN — OXYCODONE HYDROCHLORIDE 5 MG: 5 TABLET ORAL at 10:36

## 2023-09-23 RX ADMIN — ACETAMINOPHEN 650 MG: 325 TABLET, FILM COATED ORAL at 05:47

## 2023-09-23 RX ADMIN — OXYCODONE HYDROCHLORIDE AND ACETAMINOPHEN 500 MG: 500 TABLET ORAL at 10:31

## 2023-09-23 RX ADMIN — DILTIAZEM HYDROCHLORIDE 90 MG: 90 CAPSULE, EXTENDED RELEASE ORAL at 20:58

## 2023-09-23 RX ADMIN — SODIUM CHLORIDE, SODIUM LACTATE, POTASSIUM CHLORIDE, AND CALCIUM CHLORIDE 1000 ML: .6; .31; .03; .02 INJECTION, SOLUTION INTRAVENOUS at 15:37

## 2023-09-23 RX ADMIN — ACETAMINOPHEN 650 MG: 325 TABLET, FILM COATED ORAL at 20:58

## 2023-09-23 RX ADMIN — LAMOTRIGINE 100 MG: 100 TABLET ORAL at 20:58

## 2023-09-23 RX ADMIN — SERTRALINE 100 MG: 100 TABLET, FILM COATED ORAL at 10:31

## 2023-09-23 RX ADMIN — ATORVASTATIN CALCIUM 80 MG: 80 TABLET, FILM COATED ORAL at 10:31

## 2023-09-23 RX ADMIN — ENOXAPARIN SODIUM 30 MG: 30 INJECTION SUBCUTANEOUS at 10:30

## 2023-09-23 RX ADMIN — OXYBUTYNIN CHLORIDE 5 MG: 5 TABLET ORAL at 18:20

## 2023-09-23 RX ADMIN — LAMOTRIGINE 100 MG: 100 TABLET ORAL at 10:31

## 2023-09-23 RX ADMIN — OXYBUTYNIN CHLORIDE 5 MG: 5 TABLET ORAL at 10:30

## 2023-09-23 RX ADMIN — WARFARIN SODIUM 2.5 MG: 2.5 TABLET ORAL at 18:20

## 2023-09-23 NOTE — ASSESSMENT & PLAN NOTE
Encouraged calorie reduction and 30 minutes of exercise daily. Discussed impact of obesity on general health.       · On warfarin, holding for now due to bleed  · Will restart warfarin 9/23, bridge with therapeutic lovenox

## 2023-09-23 NOTE — PLAN OF CARE
Problem: PAIN - ADULT  Goal: Verbalizes/displays adequate comfort level or baseline comfort level  Description: Interventions:  - Encourage patient to monitor pain and request assistance  - Assess pain using appropriate pain scale  - Administer analgesics based on type and severity of pain and evaluate response  - Implement non-pharmacological measures as appropriate and evaluate response  - Consider cultural and social influences on pain and pain management  - Notify physician/advanced practitioner if interventions unsuccessful or patient reports new pain  Outcome: Progressing     Problem: INFECTION - ADULT  Goal: Absence or prevention of progression during hospitalization  Description: INTERVENTIONS:  - Assess and monitor for signs and symptoms of infection  - Monitor lab/diagnostic results  - Monitor all insertion sites, i.e. indwelling lines, tubes, and drains  - Monitor endotracheal if appropriate and nasal secretions for changes in amount and color  - [place] appropriate cooling/warming therapies per order  - Administer medications as ordered  - Instruct and encourage patient and family to use good hand hygiene technique  - Identify and instruct in appropriate isolation precautions for identified infection/condition  Outcome: Progressing  Goal: Absence of fever/infection during neutropenic period  Description: INTERVENTIONS:  - Monitor WBC    Outcome: Progressing     Problem: DISCHARGE PLANNING  Goal: Discharge to home or other facility with appropriate resources  Description: INTERVENTIONS:  - Identify barriers to discharge w/patient and caregiver  - Arrange for needed discharge resources and transportation as appropriate  - Identify discharge learning needs (meds, wound care, etc.)  - Arrange for interpretive services to assist at discharge as needed  - Refer to Case Management Department for coordinating discharge planning if the patient needs post-hospital services based on physician/advanced practitioner order or complex needs related to functional status, cognitive ability, or social support system  Outcome: Progressing     Problem: Knowledge Deficit  Goal: Patient/family/caregiver demonstrates understanding of disease process, treatment plan, medications, and discharge instructions  Description: Complete learning assessment and assess knowledge base.   Interventions:  - Provide teaching at level of understanding  - Provide teaching via preferred learning methods  Outcome: Progressing

## 2023-09-23 NOTE — PROGRESS NOTES
[facility]  Progress Note  Name: Michael Jones  MRN: [id number]  [facility] Date of Admission: 9/22/2023   Date of Service: 9/23/2023 I Hospital Day: 1    Assessment/Plan   Epilepsy [facility])  Assessment & Plan  · Continue home meds    S/P aortic valve replacement  Assessment & Plan  · On warfarin, holding for now due to bleed  · Will restart warfarin 9/23, bridge with therapeutic lovenox    Fall  Assessment & Plan  · Pt/ OT eval, recommending rehab    * Traumatic hematoma of left shoulder  Assessment & Plan  · With associated laceration status post bedside closure 9/22  · Compression wrap applied  · Trend hgb   · Monitor for signs infection             Bowel Regimen: colace  VTE Prophylaxis:Enoxaparin (Lovenox)     Disposition: PT recommended rehab    Subjective   Chief Complaint: shoulder pain    Subjective: pain controlled. Objective   Vitals:   Temp:  [97.6 °F (36.4 °C)-98.7 °F (37.1 °C)] 98.7 °F (37.1 °C)  HR:  [] 100  Resp:  [18-20] 20  BP: ()/(52-91) 94/52    I/O       09/21 0701  09/22 0700 09/22 0701  09/23 0700 09/23 0701  09/24 0700    P. O.   180    IV Piggyback 50      Total Intake 50  180    Urine  750 50    Total Output  750 50    Net +50 -750 +130                  Physical Exam:   GENERAL APPEARANCE:  NAD, non-toxic appearing  NEURO:  Awake, GCS 15, no focal deficits  HEENT:  Moist mucous membranes, NC/AT  CV:  Regular rate and rhythm  LUNGS:  Clear and equal breath sounds to auscultation bilaterally  GI:  Soft, nontender, and nondistended  MSK:  Moving all extremities  SKIN:  Extensive ecchymosis left shoulder, monitor for progression      Invasive Devices     Peripheral Intravenous Line  Duration           Peripheral IV 09/22/23 Dorsal (posterior); Right Hand 1 day    Peripheral IV 09/22/23 Right;Upper;Ventral (anterior) Arm 1 day                      Lab Results: Results: I have personally reviewed all pertinent laboratory/tests results  Imaging: I have personally reviewed pertinent reports.      Other Studies:

## 2023-09-23 NOTE — UTILIZATION REVIEW
Initial Clinical Review    The patient was transferred to [address] [place] [address] on 9/22/23 from [address] [place] [address], where care began on 9/22/223. 1 midnight has already been surpassed with active ongoing care. Admission: Date/Time/Statement:   Admission Orders (From admission, onward)     Ordered        09/22/23 0537  Inpatient Admission  Once                      Orders Placed This Encounter   Procedures   • Inpatient Admission     Standing Status:   Standing     Number of Occurrences:   1     Order Specific Question:   Level of Care     Answer:   Level 2 Stepdown / HOT [14]     Order Specific Question:   Estimated length of stay     Answer:   More than 2 Midnights     Order Specific Question:   Certification     Answer:   I certify that inpatient services are medically necessary for this patient for a duration of greater than two midnights. See H&P and MD Progress Notes for additional information about the patient's course of treatment. ED Arrival Information     Expected   9/22/2023     Arrival   9/22/2023 04:25    Acuity   Urgent            Means of arrival   Ambulance    Escorted by   AMPARO De [facility])    Service   Trauma    Admission type   Emergency            Arrival complaint   Fall           Chief Complaint   Patient presents with   • Trauma     Trauma transfer from . Fall from standing position onto floor. Hematoma to L shoulder       Initial Presentation: 80 y.o. female who presented initially to [address] [place] [address] then transferred to [address] [place] [address], admitted Inpatient status dt status post fall with left shoulder laceration. She states she was walking across her room when she fell, striking her left shoulder against a piece of furniture. Unclear head strike. Unclear LOC.    PMHx:   aortic valve replacement (warfarin), COPD, permanent A. fib, permanent pacemaker implantation, hypertension, hyperlipidemia, SAM with use of BiPAP and epilepsy. Transferred for evaluation of traumatic hematoma of left shoulder. Plan:  med surg, trend hgb, monitor for s/s of infection, laceration closure on 9/22, PT OT eval, continue home meds. Date: 9/23   Day 2:   Extensive ecchymosis left shoulder, monitor for progression. Transition to therapeutic lovenox to bridge to coumadin. Monitor L shoulder hematoma, pain control, PT OT, CM for dispo planning. PT OT recommending post acute rehab.    9/23 Per Geriatrics: intermittent confusion, neurovascular checks, local wound care, continue pain control, PT OT pending. Date: 9/24    Day 3: Has surpassed a 2nd midnight with active treatments and services, which include placement for post acute rehab, CM following, pain control, neurovascular checks, local wound care.      ED Triage Vitals   Temperature Pulse Respirations Blood Pressure SpO2   09/22/23 0436 09/22/23 0436 09/22/23 0436 09/22/23 0436 09/22/23 0436   98.1 °F (36.7 °C) 101 18 127/60 97 %      Temp Source Heart Rate Source Patient Position - Orthostatic VS BP Location FiO2 (%)   09/22/23 0436 09/22/23 0436 -- 09/23/23 1131 --   Oral Monitor  Left arm       Pain Score       09/22/23 0436       7          Wt Readings from Last 1 Encounters:   09/22/23 59.5 kg (131 lb 2.8 oz)     Additional Vital Signs:   Date/Time Temp Pulse Resp BP MAP (mmHg) SpO2 O2 Device   09/24/23 0833 -- 90 -- -- -- 92 % --   09/24/23 07:22:59 98.3 °F (36.8 °C) 105 16 122/78 93 93 % --   09/24/23 03:08:08 98.6 °F (37 °C) 99 18 136/85 102 93 % --   09/23/23 22:56:49 98.8 °F (37.1 °C) 116 Abnormal  20 135/81 99 93 % --   09/23/23 20:56:05 -- 99 -- 130/77 95 93 % --   09/23/23 18:54:07 99.6 °F (37.6 °C) 95 18 110/69 83 93 % --   09/23/23 17:05:22 -- 86 -- 115/72 86 94 % --   09/23/23 15:35:04 -- 90 -- 90/51 64 Abnormal  91 % --   09/23/23 15:13:33 -- 85 -- 86/54 Abnormal  65 92 % --   09/23/23 1500 -- -- -- -- -- -- None (Room air)   09/23/23 1434 -- -- -- 88/54 Abnormal  -- -- -- 09/23/23 14:32:08 99.4 °F (37.4 °C) 92 -- 87/55 Abnormal  66 93 % --   09/23/23 1240 -- -- -- 94/52 -- -- --   09/23/23 11:31:01 98.7 °F (37.1 °C) 100 20 98/61 73 92 % --   09/23/23 07:49:03 -- 102 -- -- -- 94 % --   09/23/23 03:02:47 98.2 °F (36.8 °C) 95 18 136/86 103 94 % --   09/22/23 22:57:17 97.6 °F (36.4 °C) 100 18 142/91 108 96 % --   09/22/23 2030 -- -- -- -- -- -- None (Room air)   09/22/23 18:37:26 98.1 °F (36.7 °C) 94 18 146/91 109 95 % --   09/22/23 13:21:10 98 °F (36.7 °C) 90 -- 149/92 111 94 % --   09/22/23 1100 -- 90 16 141/73 103 96 % --   09/22/23 0800 -- 90 16 118/64 85 94 % --   09/22/23 0730 -- 98 16 128/71 92 97 % --   09/22/23 0436 98.1 °F (36.7 °C) 101 18 127/60 86 97 % None (Room air)     Pertinent Labs/Diagnostic Test Results:   No orders to display         Results from last 7 days   Lab Units 09/24/23  0720 09/23/23  1541 09/23/23  0613 09/22/23 2009 09/22/23  1229 09/22/23  0610 09/22/23  0100   WBC Thousand/uL 12.28* 11.49* 11.22*  --   --   --  8.49   HEMOGLOBIN g/dL 10.4* 9.7* 11.0* 10.4* 9.9*   < > 13.1   HEMATOCRIT % 31.7* 29.5* 33.6* 30.7* 30.7*   < > 41.0   PLATELETS Thousands/uL 199 219 240  --   --   --  255   NEUTROS ABS Thousands/µL 10.59*  --  9.10*  --   --   --  6.27    < > = values in this interval not displayed.          Results from last 7 days   Lab Units 09/24/23  0720 09/23/23  1541 09/23/23  0613 09/22/23 0225   SODIUM mmol/L 130* 132* 133* 135   POTASSIUM mmol/L 4.2 3.5 3.9 3.6   CHLORIDE mmol/L 94* 93* 94* 94*   CO2 mmol/L 31 30 35* 32   ANION GAP mmol/L 5 9 4 9   BUN mg/dL 10 12 9 16   CREATININE mg/dL 0.59* 0.87 0.62 0.68   EGFR ml/min/1.73sq m 85 61 83 81   CALCIUM mg/dL 8.2* 8.1* 8.7 9.9   MAGNESIUM mg/dL 2.1 1.2*  --   --    PHOSPHORUS mg/dL  --  3.6  --   --      Results from last 7 days   Lab Units 09/22/23 0225   AST U/L 20   ALT U/L 17   ALK PHOS U/L 90   TOTAL PROTEIN g/dL 6.9   ALBUMIN g/dL 4.4   TOTAL BILIRUBIN mg/dL 0.56         Results from last 7 days   Lab Units 09/24/23  0720 09/23/23  1541 09/23/23  0613 09/22/23  0225   GLUCOSE RANDOM mg/dL 127 125 88 121     Results from last 7 days   Lab Units 09/23/23  1810 09/23/23  1541 09/22/23  0225   HS TNI 0HR ng/L  --  15 16   HS TNI 2HR ng/L 17  --   --    HSTNI D2 ng/L 2  --   --          Results from last 7 days   Lab Units 09/23/23  1102 09/22/23  0645 09/22/23  0225   PROTIME seconds 13.9 17.2* 26.7*   INR  1.05 1.38* 2.32*   PTT seconds  --   --  36     Results from last 7 days   Lab Units 09/23/23  1541   LACTIC ACID mmol/L 1.7     ED Treatment:   Medication Administration from 09/22/2023 0326 to 09/22/2023 1254       Date/Time Order Dose Route Action     09/22/2023 0551 EDT phytonadione (AQUA-MEPHYTON) 10 mg/mL 10 mg in sodium chloride 0.9 % 50 mL IVPB 10 mg Intravenous New Bag     09/22/2023 0523 EDT prothrombin complex concentrate (human) (Kcentra) 1,500 Units 1,500 Units Intravenous Given     09/22/2023 0602 EDT potassium chloride (K-DUR,KLOR-CON) CR tablet 40 mEq 40 mEq Oral Given     09/22/2023 9862 EDT acetaminophen (TYLENOL) tablet 650 mg 650 mg Oral Given     09/22/2023 0809 EDT lamoTRIgine (LaMICtal) tablet 100 mg 100 mg Oral Given     09/22/2023 0605 EDT pantoprazole (PROTONIX) EC tablet 20 mg 20 mg Oral Given        Past Medical History:   Diagnosis Date   • Anemia    • Anxiety disorder, unspecified    • Anxiety disorder, unspecified    • Cardiac arrest, cause unspecified ([address])    • Chronic obstructive pulmonary disease, unspecified ([address])    • Constipation    • Essential (primary) hypertension    • GERD (gastroesophageal reflux disease)    • Hypo-osmolality and hyponatremia    • Hypothyroidism    • Nontraumatic hematoma of soft tissue    • Other seizures (HCC)    • Persistent atrial fibrillation ([address])    • Unspecified urinary incontinence      Present on Admission:  **None**      Admitting Diagnosis: Fall, initial encounter [W19. XXXA]  Unspecified multiple injuries, initial encounter [T07.XXXA]  Age/Sex: 80 y.o. female  Admission Orders:  Scheduled Medications:  ascorbic acid, 500 mg, Oral, Daily  atorvastatin, 80 mg, Oral, Daily  diltiazem, 90 mg, Oral, Q12H SAMANTHA  docusate sodium, 100 mg, Oral, BID  enoxaparin, 1 mg/kg, Subcutaneous, Q12H SAMANTHA  Fluticasone Furoate-Vilanterol, 1 puff, Inhalation, Daily  lamoTRIgine, 100 mg, Oral, BID  levothyroxine, 125 mcg, Oral, Early Morning  oxybutynin, 5 mg, Oral, BID  pantoprazole, 20 mg, Oral, Early Morning  sertraline, 100 mg, Oral, Daily  warfarin, 2.5 mg, Oral, Daily (warfarin)      Continuous IV Infusions: none     PRN Meds:  acetaminophen, 650 mg, Oral, Q6H PRN x4 thus far  albuterol, 2 puff, Inhalation, Q6H PRN  HYDROmorphone, 0.2 mg, Intravenous, Q3H PRN  lactase, 9,000 Units, Oral, TID PRN  oxyCODONE, 5 mg, Oral, Q6H PRN x1 thus far  oxyCODONE, 2.5 mg, Oral, Q6H PRN    scd    IP CONSULT TO GERONTOLOGY    Network Utilization Review Department  ATTENTION: Please call with any questions or concerns to [phone] and carefully listen to the prompts so that you are directed to the right person. All voicemails are confidential.  Pedro Finder all requests for admission clinical reviews, approved or denied determinations and any other requests to dedicated fax number below belonging to the campus where the patient is receiving treatment.  List of dedicated fax numbers for the Facilities:  [place] DENIALS (Administrative/Medical Necessity) [phone] [address] (Maternity/NICU/Pediatrics) [phone]   [address] [place] [address] [address]  [facility] [phone]

## 2023-09-24 LAB
ANION GAP SERPL CALCULATED.3IONS-SCNC: 5 MMOL/L
BASOPHILS # BLD AUTO: 0.03 THOUSANDS/ÂΜL (ref 0–0.1)
BASOPHILS NFR BLD AUTO: 0 % (ref 0–1)
BUN SERPL-MCNC: 10 MG/DL (ref 5–25)
CALCIUM SERPL-MCNC: 8.2 MG/DL (ref 8.4–10.2)
CHLORIDE SERPL-SCNC: 94 MMOL/L (ref 96–108)
CO2 SERPL-SCNC: 31 MMOL/L (ref 21–32)
CREAT SERPL-MCNC: 0.59 MG/DL (ref 0.6–1.3)
EOSINOPHIL # BLD AUTO: 0.01 THOUSAND/ÂΜL (ref 0–0.61)
EOSINOPHIL NFR BLD AUTO: 0 % (ref 0–6)
ERYTHROCYTE [DISTWIDTH] IN BLOOD BY AUTOMATED COUNT: 14.9 % (ref 11.6–15.1)
GFR SERPL CREATININE-BSD FRML MDRD: 85 ML/MIN/1.73SQ M
GLUCOSE SERPL-MCNC: 127 MG/DL (ref 65–140)
HCT VFR BLD AUTO: 31.7 % (ref 34.8–46.1)
HGB BLD-MCNC: 10.4 G/DL (ref 11.5–15.4)
IMM GRANULOCYTES # BLD AUTO: 0.07 THOUSAND/UL (ref 0–0.2)
IMM GRANULOCYTES NFR BLD AUTO: 1 % (ref 0–2)
INR PPP: 1.14 (ref 0.84–1.19)
LYMPHOCYTES # BLD AUTO: 0.49 THOUSANDS/ÂΜL (ref 0.6–4.47)
LYMPHOCYTES NFR BLD AUTO: 4 % (ref 14–44)
MAGNESIUM SERPL-MCNC: 2.1 MG/DL (ref 1.9–2.7)
MCH RBC QN AUTO: 30.4 PG (ref 26.8–34.3)
MCHC RBC AUTO-ENTMCNC: 32.8 G/DL (ref 31.4–37.4)
MCV RBC AUTO: 93 FL (ref 82–98)
MONOCYTES # BLD AUTO: 1.09 THOUSAND/ÂΜL (ref 0.17–1.22)
MONOCYTES NFR BLD AUTO: 9 % (ref 4–12)
NEUTROPHILS # BLD AUTO: 10.59 THOUSANDS/ÂΜL (ref 1.85–7.62)
NEUTS SEG NFR BLD AUTO: 86 % (ref 43–75)
NRBC BLD AUTO-RTO: 0 /100 WBCS
PLATELET # BLD AUTO: 199 THOUSANDS/UL (ref 149–390)
PMV BLD AUTO: 9.8 FL (ref 8.9–12.7)
POTASSIUM SERPL-SCNC: 4.2 MMOL/L (ref 3.5–5.3)
PROTHROMBIN TIME: 14.9 SECONDS (ref 11.6–14.5)
RBC # BLD AUTO: 3.42 MILLION/UL (ref 3.81–5.12)
SODIUM SERPL-SCNC: 130 MMOL/L (ref 135–147)
WBC # BLD AUTO: 12.28 THOUSAND/UL (ref 4.31–10.16)

## 2023-09-24 PROCEDURE — 80048 BASIC METABOLIC PNL TOTAL CA: CPT | Performed by: STUDENT IN AN ORGANIZED HEALTH CARE EDUCATION/TRAINING PROGRAM

## 2023-09-24 PROCEDURE — 83735 ASSAY OF MAGNESIUM: CPT | Performed by: STUDENT IN AN ORGANIZED HEALTH CARE EDUCATION/TRAINING PROGRAM

## 2023-09-24 PROCEDURE — 85610 PROTHROMBIN TIME: CPT | Performed by: STUDENT IN AN ORGANIZED HEALTH CARE EDUCATION/TRAINING PROGRAM

## 2023-09-24 PROCEDURE — 87081 CULTURE SCREEN ONLY: CPT | Performed by: SURGERY

## 2023-09-24 PROCEDURE — 85025 COMPLETE CBC W/AUTO DIFF WBC: CPT | Performed by: STUDENT IN AN ORGANIZED HEALTH CARE EDUCATION/TRAINING PROGRAM

## 2023-09-24 PROCEDURE — 99232 SBSQ HOSP IP/OBS MODERATE 35: CPT | Performed by: SURGERY

## 2023-09-24 RX ADMIN — ENOXAPARIN SODIUM 60 MG: 60 INJECTION SUBCUTANEOUS at 20:25

## 2023-09-24 RX ADMIN — WARFARIN SODIUM 2.5 MG: 2.5 TABLET ORAL at 17:02

## 2023-09-24 RX ADMIN — PANTOPRAZOLE SODIUM 20 MG: 20 TABLET, DELAYED RELEASE ORAL at 06:30

## 2023-09-24 RX ADMIN — DILTIAZEM HYDROCHLORIDE 90 MG: 90 CAPSULE, EXTENDED RELEASE ORAL at 08:13

## 2023-09-24 RX ADMIN — OXYBUTYNIN CHLORIDE 5 MG: 5 TABLET ORAL at 08:12

## 2023-09-24 RX ADMIN — OXYBUTYNIN CHLORIDE 5 MG: 5 TABLET ORAL at 17:02

## 2023-09-24 RX ADMIN — OXYCODONE HYDROCHLORIDE AND ACETAMINOPHEN 500 MG: 500 TABLET ORAL at 08:12

## 2023-09-24 RX ADMIN — ATORVASTATIN CALCIUM 80 MG: 80 TABLET, FILM COATED ORAL at 08:12

## 2023-09-24 RX ADMIN — ACETAMINOPHEN 650 MG: 325 TABLET, FILM COATED ORAL at 20:25

## 2023-09-24 RX ADMIN — LAMOTRIGINE 100 MG: 100 TABLET ORAL at 08:12

## 2023-09-24 RX ADMIN — ENOXAPARIN SODIUM 60 MG: 60 INJECTION SUBCUTANEOUS at 08:12

## 2023-09-24 RX ADMIN — DOCUSATE SODIUM 100 MG: 100 CAPSULE, LIQUID FILLED ORAL at 08:11

## 2023-09-24 RX ADMIN — SERTRALINE 100 MG: 100 TABLET, FILM COATED ORAL at 08:12

## 2023-09-24 RX ADMIN — LAMOTRIGINE 100 MG: 100 TABLET ORAL at 20:26

## 2023-09-24 RX ADMIN — LEVOTHYROXINE SODIUM 125 MCG: 125 TABLET ORAL at 06:30

## 2023-09-24 RX ADMIN — FLUTICASONE FUROATE AND VILANTEROL TRIFENATATE 1 PUFF: 100; 25 POWDER RESPIRATORY (INHALATION) at 08:12

## 2023-09-24 RX ADMIN — DILTIAZEM HYDROCHLORIDE 90 MG: 90 CAPSULE, EXTENDED RELEASE ORAL at 20:26

## 2023-09-24 NOTE — PLAN OF CARE
Problem: PAIN - ADULT  Goal: Verbalizes/displays adequate comfort level or baseline comfort level  Description: Interventions:  - Encourage patient to monitor pain and request assistance  - Assess pain using appropriate pain scale  - Administer analgesics based on type and severity of pain and evaluate response  - Implement non-pharmacological measures as appropriate and evaluate response  - Consider cultural and social influences on pain and pain management  - Notify physician/advanced practitioner if interventions unsuccessful or patient reports new pain  Outcome: Progressing     Problem: INFECTION - ADULT  Goal: Absence or prevention of progression during hospitalization  Description: INTERVENTIONS:  - Assess and monitor for signs and symptoms of infection  - Monitor lab/diagnostic results  - Monitor all insertion sites, i.e. indwelling lines, tubes, and drains  - Monitor endotracheal if appropriate and nasal secretions for changes in amount and color  - [place] appropriate cooling/warming therapies per order  - Administer medications as ordered  - Instruct and encourage patient and family to use good hand hygiene technique  - Identify and instruct in appropriate isolation precautions for identified infection/condition  Outcome: Progressing  Goal: Absence of fever/infection during neutropenic period  Description: INTERVENTIONS:  - Monitor WBC    Outcome: Progressing     Problem: DISCHARGE PLANNING  Goal: Discharge to home or other facility with appropriate resources  Description: INTERVENTIONS:  - Identify barriers to discharge w/patient and caregiver  - Arrange for needed discharge resources and transportation as appropriate  - Identify discharge learning needs (meds, wound care, etc.)  - Arrange for interpretive services to assist at discharge as needed  - Refer to Case Management Department for coordinating discharge planning if the patient needs post-hospital services based on physician/advanced practitioner order or complex needs related to functional status, cognitive ability, or social support system  Outcome: Progressing     Problem: Knowledge Deficit  Goal: Patient/family/caregiver demonstrates understanding of disease process, treatment plan, medications, and discharge instructions  Description: Complete learning assessment and assess knowledge base.   Interventions:  - Provide teaching at level of understanding  - Provide teaching via preferred learning methods  Outcome: Progressing

## 2023-09-24 NOTE — ASSESSMENT & PLAN NOTE
· On warfarin, holding for now due to bleed  · Will restart warfarin 9/23, bridge with therapeutic lovenox

## 2023-09-24 NOTE — PLAN OF CARE
Problem: PAIN - ADULT  Goal: Verbalizes/displays adequate comfort level or baseline comfort level  Description: Interventions:  - Encourage patient to monitor pain and request assistance  - Assess pain using appropriate pain scale  - Administer analgesics based on type and severity of pain and evaluate response  - Implement non-pharmacological measures as appropriate and evaluate response  - Consider cultural and social influences on pain and pain management  - Notify physician/advanced practitioner if interventions unsuccessful or patient reports new pain  Outcome: Progressing     Problem: INFECTION - ADULT  Goal: Absence or prevention of progression during hospitalization  Description: INTERVENTIONS:  - Assess and monitor for signs and symptoms of infection  - Monitor lab/diagnostic results  - Monitor all insertion sites, i.e. indwelling lines, tubes, and drains  - Monitor endotracheal if appropriate and nasal secretions for changes in amount and color  - [place] appropriate cooling/warming therapies per order  - Administer medications as ordered  - Instruct and encourage patient and family to use good hand hygiene technique  - Identify and instruct in appropriate isolation precautions for identified infection/condition  Outcome: Progressing

## 2023-09-24 NOTE — PROGRESS NOTES
[address] [place] [address]  Progress Note  Name: Paresh Mercado  MRN: [id number]  [facility] Date of Admission: 9/22/2023   Date of Service: 9/24/2023 I Hospital Day: 2    Assessment/Plan   Epilepsy [facility])  Assessment & Plan  · Continue home meds    S/P aortic valve replacement  Assessment & Plan  · On warfarin, holding for now due to bleed  · Will restart warfarin 9/23, bridge with therapeutic lovenox    Fall  Assessment & Plan  · Pt/ OT eval, recommending rehab    * Traumatic hematoma of left shoulder  Assessment & Plan  · With associated laceration status post bedside closure 9/22  · Compression wrap applied  · Trend hgb   · Monitor for signs infection             Bowel Regimen: colace  VTE Prophylaxis:Enoxaparin (Lovenox)     Disposition: rehab    Subjective   Chief Complaint: pain controlled. Subjective: doing well. Pain controlled. Objective   Vitals:   Temp:  [98.3 °F (36.8 °C)-99.6 °F (37.6 °C)] 98.3 °F (36.8 °C)  HR:  [] 90  Resp:  [16-20] 16  BP: ()/(51-85) 122/78    I/O       09/22 0701  09/23 0700 09/23 0701  09/24 0700 09/24 0701  09/25 0700    P. O.  180     IV Piggyback       Total Intake  180     Urine 750 150     Total Output 750 150     Net -750 +30                   Physical Exam:   GENERAL APPEARANCE:  NAD, non-toxic appearing  NEURO:  Awake, GCS 15, no focal deficits  HEENT:  Moist mucous membranes, NC/AT  CV:  Regular rate and rhythm  LUNGS:  Clear and equal breath sounds to auscultation bilaterally  GI:  Soft, nontender, and nondistended  MSK:  Moving all extremities  SKIN:  Bruising left shoulder stable      Invasive Devices     Peripheral Intravenous Line  Duration           Peripheral IV 09/23/23 Distal;Left;Ventral (anterior) Forearm <1 day                      Lab Results: Results: I have personally reviewed all pertinent laboratory/tests results  Imaging: I have personally reviewed pertinent reports.      Other Studies:

## 2023-09-24 NOTE — PLAN OF CARE
Problem: PAIN - ADULT  Goal: Verbalizes/displays adequate comfort level or baseline comfort level  Description: Interventions:  - Encourage patient to monitor pain and request assistance  - Assess pain using appropriate pain scale  - Administer analgesics based on type and severity of pain and evaluate response  - Implement non-pharmacological measures as appropriate and evaluate response  - Consider cultural and social influences on pain and pain management  - Notify physician/advanced practitioner if interventions unsuccessful or patient reports new pain  Outcome: Progressing     Problem: INFECTION - ADULT  Goal: Absence or prevention of progression during hospitalization  Description: INTERVENTIONS:  - Assess and monitor for signs and symptoms of infection  - Monitor lab/diagnostic results  - Monitor all insertion sites, i.e. indwelling lines, tubes, and drains  - Monitor endotracheal if appropriate and nasal secretions for changes in amount and color  - [place] appropriate cooling/warming therapies per order  - Administer medications as ordered  - Instruct and encourage patient and family to use good hand hygiene technique  - Identify and instruct in appropriate isolation precautions for identified infection/condition  Outcome: Progressing  Goal: Absence of fever/infection during neutropenic period  Description: INTERVENTIONS:  - Monitor WBC    Outcome: Progressing     Problem: SAFETY ADULT  Goal: Patient will remain free of falls  Description: INTERVENTIONS:  - Educate patient/family on patient safety including physical limitations  - Instruct patient to call for assistance with activity   - Consult OT/PT to assist with strengthening/mobility   - Keep Call bell within reach  - Keep bed low and locked with side rails adjusted as appropriate  - Keep care items and personal belongings within reach  - Initiate and maintain comfort rounds  - Make Fall Risk Sign visible to staff  - Offer Toileting every 2 Hours, in advance of need  - Initiate/Maintain bed alarm  - Obtain necessary fall risk management equipment: yellow socks  - Apply yellow socks and bracelet for high fall risk patients  - Consider moving patient to room near nurses station  Outcome: Progressing  Goal: Maintain or return to baseline ADL function  Description: INTERVENTIONS:  -  Assess patient's ability to carry out ADLs; assess patient's baseline for ADL function and identify physical deficits which impact ability to perform ADLs (bathing, care of mouth/teeth, toileting, grooming, dressing, etc.)  - Assess/evaluate cause of self-care deficits   - Assess range of motion  - Assess patient's mobility; develop plan if impaired  - Assess patient's need for assistive devices and provide as appropriate  - Encourage maximum independence but intervene and supervise when necessary  - Involve family in performance of ADLs  - Assess for home care needs following discharge   - Consider OT consult to assist with ADL evaluation and planning for discharge  - Provide patient education as appropriate  Outcome: Progressing  Goal: Maintains/Returns to pre admission functional level  Description: INTERVENTIONS:  - Perform BMAT or MOVE assessment daily.   - Set and communicate daily mobility goal to care team and patient/family/caregiver. - Collaborate with rehabilitation services on mobility goals if consulted  - Perform Range of Motion 3 times a day. - Reposition patient every 2 hours.   - Dangle patient 3 times a day  - Stand patient 3 times a day  - Ambulate patient 3 times a day  - Out of bed to chair 3 times a day   - Out of bed for meals 3 times a day  - Out of bed for toileting  - Record patient progress and toleration of activity level   Outcome: Progressing     Problem: DISCHARGE PLANNING  Goal: Discharge to home or other facility with appropriate resources  Description: INTERVENTIONS:  - Identify barriers to discharge w/patient and caregiver  - Arrange for needed discharge resources and transportation as appropriate  - Identify discharge learning needs (meds, wound care, etc.)  - Arrange for interpretive services to assist at discharge as needed  - Refer to Case Management Department for coordinating discharge planning if the patient needs post-hospital services based on physician/advanced practitioner order or complex needs related to functional status, cognitive ability, or social support system  Outcome: Progressing     Problem: Knowledge Deficit  Goal: Patient/family/caregiver demonstrates understanding of disease process, treatment plan, medications, and discharge instructions  Description: Complete learning assessment and assess knowledge base. Interventions:  - Provide teaching at level of understanding  - Provide teaching via preferred learning methods  Outcome: Progressing     Problem: MOBILITY - ADULT  Goal: Maintain or return to baseline ADL function  Description: INTERVENTIONS:  -  Assess patient's ability to carry out ADLs; assess patient's baseline for ADL function and identify physical deficits which impact ability to perform ADLs (bathing, care of mouth/teeth, toileting, grooming, dressing, etc.)  - Assess/evaluate cause of self-care deficits   - Assess range of motion  - Assess patient's mobility; develop plan if impaired  - Assess patient's need for assistive devices and provide as appropriate  - Encourage maximum independence but intervene and supervise when necessary  - Involve family in performance of ADLs  - Assess for home care needs following discharge   - Consider OT consult to assist with ADL evaluation and planning for discharge  - Provide patient education as appropriate  Outcome: Progressing  Goal: Maintains/Returns to pre admission functional level  Description: INTERVENTIONS:  - Perform BMAT or MOVE assessment daily.   - Set and communicate daily mobility goal to care team and patient/family/caregiver.    - Collaborate with rehabilitation services on mobility goals if consulted  - Out of bed for toileting  - Record patient progress and toleration of activity level   Outcome: Progressing     Problem: Prexisting or High Potential for Compromised Skin Integrity  Goal: Skin integrity is maintained or improved  Description: INTERVENTIONS:  - Identify patients at risk for skin breakdown  - Assess and monitor skin integrity  - Assess and monitor nutrition and hydration status  - Monitor labs   - Assess for incontinence   - Turn and reposition patient  - Assist with mobility/ambulation  - Relieve pressure over bony prominences  - Avoid friction and shearing  - Provide appropriate hygiene as needed including keeping skin clean and dry  - Evaluate need for skin moisturizer/barrier cream  - Collaborate with interdisciplinary team   - Patient/family teaching  - Consider wound care consult   Outcome: Progressing

## 2023-09-25 LAB
ANION GAP SERPL CALCULATED.3IONS-SCNC: 8 MMOL/L
BASOPHILS # BLD AUTO: 0.04 THOUSANDS/ÂΜL (ref 0–0.1)
BASOPHILS NFR BLD AUTO: 0 % (ref 0–1)
BUN SERPL-MCNC: 9 MG/DL (ref 5–25)
CALCIUM SERPL-MCNC: 7.9 MG/DL (ref 8.4–10.2)
CHLORIDE SERPL-SCNC: 93 MMOL/L (ref 96–108)
CO2 SERPL-SCNC: 29 MMOL/L (ref 21–32)
CREAT SERPL-MCNC: 0.56 MG/DL (ref 0.6–1.3)
EOSINOPHIL # BLD AUTO: 0.14 THOUSAND/ÂΜL (ref 0–0.61)
EOSINOPHIL NFR BLD AUTO: 1 % (ref 0–6)
ERYTHROCYTE [DISTWIDTH] IN BLOOD BY AUTOMATED COUNT: 14.9 % (ref 11.6–15.1)
GFR SERPL CREATININE-BSD FRML MDRD: 86 ML/MIN/1.73SQ M
GLUCOSE SERPL-MCNC: 89 MG/DL (ref 65–140)
HCT VFR BLD AUTO: 29.4 % (ref 34.8–46.1)
HGB BLD-MCNC: 9.7 G/DL (ref 11.5–15.4)
IMM GRANULOCYTES # BLD AUTO: 0.1 THOUSAND/UL (ref 0–0.2)
IMM GRANULOCYTES NFR BLD AUTO: 1 % (ref 0–2)
INR PPP: 1.26 (ref 0.84–1.19)
LYMPHOCYTES # BLD AUTO: 0.83 THOUSANDS/ÂΜL (ref 0.6–4.47)
LYMPHOCYTES NFR BLD AUTO: 8 % (ref 14–44)
MCH RBC QN AUTO: 30.2 PG (ref 26.8–34.3)
MCHC RBC AUTO-ENTMCNC: 33 G/DL (ref 31.4–37.4)
MCV RBC AUTO: 92 FL (ref 82–98)
MONOCYTES # BLD AUTO: 1.18 THOUSAND/ÂΜL (ref 0.17–1.22)
MONOCYTES NFR BLD AUTO: 11 % (ref 4–12)
MRSA NOSE QL CULT: NORMAL
NEUTROPHILS # BLD AUTO: 8.46 THOUSANDS/ÂΜL (ref 1.85–7.62)
NEUTS SEG NFR BLD AUTO: 79 % (ref 43–75)
NRBC BLD AUTO-RTO: 0 /100 WBCS
PLATELET # BLD AUTO: 193 THOUSANDS/UL (ref 149–390)
PMV BLD AUTO: 10.1 FL (ref 8.9–12.7)
POTASSIUM SERPL-SCNC: 4.7 MMOL/L (ref 3.5–5.3)
PROTHROMBIN TIME: 16 SECONDS (ref 11.6–14.5)
RBC # BLD AUTO: 3.21 MILLION/UL (ref 3.81–5.12)
SODIUM SERPL-SCNC: 130 MMOL/L (ref 135–147)
WBC # BLD AUTO: 10.75 THOUSAND/UL (ref 4.31–10.16)

## 2023-09-25 PROCEDURE — 80048 BASIC METABOLIC PNL TOTAL CA: CPT | Performed by: NURSE PRACTITIONER

## 2023-09-25 PROCEDURE — 97110 THERAPEUTIC EXERCISES: CPT

## 2023-09-25 PROCEDURE — 85610 PROTHROMBIN TIME: CPT

## 2023-09-25 PROCEDURE — 99232 SBSQ HOSP IP/OBS MODERATE 35: CPT | Performed by: STUDENT IN AN ORGANIZED HEALTH CARE EDUCATION/TRAINING PROGRAM

## 2023-09-25 PROCEDURE — 85025 COMPLETE CBC W/AUTO DIFF WBC: CPT | Performed by: NURSE PRACTITIONER

## 2023-09-25 PROCEDURE — 97116 GAIT TRAINING THERAPY: CPT

## 2023-09-25 RX ADMIN — LAMOTRIGINE 100 MG: 100 TABLET ORAL at 08:51

## 2023-09-25 RX ADMIN — DILTIAZEM HYDROCHLORIDE 90 MG: 90 CAPSULE, EXTENDED RELEASE ORAL at 21:37

## 2023-09-25 RX ADMIN — LAMOTRIGINE 100 MG: 100 TABLET ORAL at 21:37

## 2023-09-25 RX ADMIN — ENOXAPARIN SODIUM 60 MG: 60 INJECTION SUBCUTANEOUS at 21:38

## 2023-09-25 RX ADMIN — ATORVASTATIN CALCIUM 80 MG: 80 TABLET, FILM COATED ORAL at 08:50

## 2023-09-25 RX ADMIN — OXYBUTYNIN CHLORIDE 5 MG: 5 TABLET ORAL at 18:32

## 2023-09-25 RX ADMIN — OXYCODONE HYDROCHLORIDE AND ACETAMINOPHEN 500 MG: 500 TABLET ORAL at 08:50

## 2023-09-25 RX ADMIN — ENOXAPARIN SODIUM 60 MG: 60 INJECTION SUBCUTANEOUS at 08:50

## 2023-09-25 RX ADMIN — SERTRALINE 100 MG: 100 TABLET, FILM COATED ORAL at 08:51

## 2023-09-25 RX ADMIN — PANTOPRAZOLE SODIUM 20 MG: 20 TABLET, DELAYED RELEASE ORAL at 05:03

## 2023-09-25 RX ADMIN — FLUTICASONE FUROATE AND VILANTEROL TRIFENATATE 1 PUFF: 100; 25 POWDER RESPIRATORY (INHALATION) at 08:52

## 2023-09-25 RX ADMIN — LEVOTHYROXINE SODIUM 125 MCG: 125 TABLET ORAL at 05:03

## 2023-09-25 RX ADMIN — WARFARIN SODIUM 2.5 MG: 2.5 TABLET ORAL at 18:32

## 2023-09-25 RX ADMIN — DILTIAZEM HYDROCHLORIDE 90 MG: 90 CAPSULE, EXTENDED RELEASE ORAL at 08:52

## 2023-09-25 RX ADMIN — OXYBUTYNIN CHLORIDE 5 MG: 5 TABLET ORAL at 08:51

## 2023-09-25 NOTE — PROGRESS NOTES
[address] [place] [address]  Progress Note  Name: Kristyn Bryant  MRN: [id number]  [facility] I Date of Admission: 9/22/2023   Date of Service: 9/25/2023 I Hospital Day: 3    Assessment/Plan   Epilepsy [facility])  Assessment & Plan  · Continue home meds    S/P aortic valve replacement  Assessment & Plan  · On warfarin, holding for now due to bleed  · Will restart warfarin 9/23, bridge with therapeutic lovenox    Fall  Assessment & Plan  · Pt/ OT eval, recommending rehab    * Traumatic hematoma of left shoulder  Assessment & Plan  · With associated laceration status post bedside closure 9/22  · Compression wrap applied  · Trend hgb   · Monitor for signs infection           Bowel Regimen: colace  VTE Prophylaxis:Enoxaparin (Lovenox)     Disposition: rheab    Subjective   Chief Complaint: Pain is controlled         Objective   Vitals:   Temp:  [98.2 °F (36.8 °C)-98.9 °F (37.2 °C)] 98.2 °F (36.8 °C)  HR:  [79-98] 87  Resp:  [16-18] 18  BP: (117-120)/(71-77) 119/73    I/O       09/23 0701  09/24 0700 09/24 0701  09/25 0700 09/25 0701  09/26 0700    P. O. 180 624     Total Intake(mL/kg) 180 624 (10.9)     Urine (mL/kg/hr) 150 650 (0.5)     Total Output 150 650     Net +30 -26                   Physical Exam:   GENERAL APPEARANCE: NAD  NEURO: Awake, GCS 15, no focal deficits  HEENT: Normocephalic, atraumatic. Mucous membranes moist  CV: RRR  LUNGS: Clear to auscultation bilaterally  GI: Abdomen soft and nontender  MSK: Moving all extremities  SKIN: Bruising of left shoulder    Invasive Devices     Peripheral Intravenous Line  Duration           Peripheral IV 09/23/23 Distal;Left;Ventral (anterior) Forearm 1 day          Drain  Duration           External Urinary Catheter <1 day                      Lab Results: Results: I have personally reviewed all pertinent laboratory/tests results  Imaging: I have personally reviewed pertinent reports.     Other Studies: none

## 2023-09-25 NOTE — PLAN OF CARE
Problem: PHYSICAL THERAPY ADULT  Goal: Performs mobility at highest level of function for planned discharge setting. See evaluation for individualized goals. Description: Treatment/Interventions: Functional transfer training, LE strengthening/ROM, Therapeutic exercise, Endurance training, Patient/family training, Equipment eval/education, Bed mobility, Gait training, Spoke to nursing, OT  Equipment Recommended: Zohreh Olivares       See flowsheet documentation for full assessment, interventions and recommendations. Outcome: Progressing  Note: Prognosis: Good  Problem List: Decreased strength, Decreased endurance, Decreased mobility, Pain  Assessment: Pt seen for PT treatment session this date. Therapy session focused on bed mobility, functional transfers, and ambulation in order to improve overall mobility and independence. Pt also participates in seated therapeutic exercises, completes with minimal cueing. Pt requires Assist X 1 for mobility , with limiting factors being pain, generalized weakness, fatigue ,impaired mobility, gait deviations. Pt was left in chair at the end of PT session with all needs in reach. Pt would benefit from continued PT services while in hospital to address remaining limitations. The patient's AM-PAC Basic Mobility Inpatient Short Form Raw Score is 16. A Raw score of less than or equal to 16 suggests the patient may benefit from discharge to post-acute rehabilitation services. Please also refer to the recommendation of the Physical Therapist for safe discharge planning. Post dc recommendation is Rehab at this time. Barriers to Discharge: Decreased caregiver support     PT Discharge Recommendation: Post acute rehabilitation services    See flowsheet documentation for full assessment. Fall Risk

## 2023-09-25 NOTE — UTILIZATION REVIEW
NOTIFICATION OF INPATIENT ADMISSION   AUTHORIZATION REQUEST   SERVICING FACILITY:   [facility]  Address: [address] [facility], [address] [place] [address] Place [address]  Tax ID: [id number]  NPI: [id number] ATTENDING PROVIDER:  Attending Name and NPI#: Aparna Garces [[id number]]  Address: [address]  Phone: [phone]   ADMISSION INFORMATION:  [facility]   Place of Service Code: 21  Inpatient Admission Date/Time: 9/22/23  5:37 AM  Discharge Date/Time: No discharge date for patient encounter. Admitting Diagnosis Code/Description:  Fall, initial encounter [W19. XXXA]  Unspecified multiple injuries, initial encounter [T07. XXXA]     UTILIZATION REVIEW CONTACT:  Isela Silvestre Utilization   Network Utilization Review Department  Phone: [phone]  Fax: [phone]  Email: [email or web address]  Contact for approvals/pending authorizations, clinical reviews, and discharge. PHYSICIAN ADVISORY SERVICES:  Medical Necessity Denial & [place] Review  Phone: [phone]  Fax: [phone]  Email: [email or web address]

## 2023-09-25 NOTE — PLAN OF CARE
Problem: PAIN - ADULT  Goal: Verbalizes/displays adequate comfort level or baseline comfort level  Description: Interventions:  - Encourage patient to monitor pain and request assistance  - Assess pain using appropriate pain scale  - Administer analgesics based on type and severity of pain and evaluate response  - Implement non-pharmacological measures as appropriate and evaluate response  - Consider cultural and social influences on pain and pain management  - Notify physician/advanced practitioner if interventions unsuccessful or patient reports new pain  Outcome: Progressing     Problem: INFECTION - ADULT  Goal: Absence or prevention of progression during hospitalization  Description: INTERVENTIONS:  - Assess and monitor for signs and symptoms of infection  - Monitor lab/diagnostic results  - Monitor all insertion sites, i.e. indwelling lines, tubes, and drains  - Monitor endotracheal if appropriate and nasal secretions for changes in amount and color  - [place] appropriate cooling/warming therapies per order  - Administer medications as ordered  - Instruct and encourage patient and family to use good hand hygiene technique  - Identify and instruct in appropriate isolation precautions for identified infection/condition  Outcome: Progressing  Goal: Absence of fever/infection during neutropenic period  Description: INTERVENTIONS:  - Monitor WBC    Outcome: Progressing     Problem: SAFETY ADULT  Goal: Patient will remain free of falls  Description: INTERVENTIONS:  - Educate patient/family on patient safety including physical limitations  - Instruct patient to call for assistance with activity   - Consult OT/PT to assist with strengthening/mobility   - Keep Call bell within reach  - Keep bed low and locked with side rails adjusted as appropriate  - Keep care items and personal belongings within reach  - Initiate and maintain comfort rounds  - Make Fall Risk Sign visible to staff  - Apply yellow socks and bracelet for high fall risk patients  - Consider moving patient to room near nurses station  Outcome: Progressing  Goal: Maintain or return to baseline ADL function  Description: INTERVENTIONS:  -  Assess patient's ability to carry out ADLs; assess patient's baseline for ADL function and identify physical deficits which impact ability to perform ADLs (bathing, care of mouth/teeth, toileting, grooming, dressing, etc.)  - Assess/evaluate cause of self-care deficits   - Assess range of motion  - Assess patient's mobility; develop plan if impaired  - Assess patient's need for assistive devices and provide as appropriate  - Encourage maximum independence but intervene and supervise when necessary  - Involve family in performance of ADLs  - Assess for home care needs following discharge   - Consider OT consult to assist with ADL evaluation and planning for discharge  - Provide patient education as appropriate  Outcome: Progressing  Goal: Maintains/Returns to pre admission functional level  Description: INTERVENTIONS:  - Perform BMAT or MOVE assessment daily.   - Set and communicate daily mobility goal to care team and patient/family/caregiver.    - Collaborate with rehabilitation services on mobility goals if consulted  - Out of bed for toileting  - Record patient progress and toleration of activity level   Outcome: Progressing     Problem: DISCHARGE PLANNING  Goal: Discharge to home or other facility with appropriate resources  Description: INTERVENTIONS:  - Identify barriers to discharge w/patient and caregiver  - Arrange for needed discharge resources and transportation as appropriate  - Identify discharge learning needs (meds, wound care, etc.)  - Arrange for interpretive services to assist at discharge as needed  - Refer to Case Management Department for coordinating discharge planning if the patient needs post-hospital services based on physician/advanced practitioner order or complex needs related to functional status, cognitive ability, or social support system  Outcome: Progressing     Problem: Knowledge Deficit  Goal: Patient/family/caregiver demonstrates understanding of disease process, treatment plan, medications, and discharge instructions  Description: Complete learning assessment and assess knowledge base. Interventions:  - Provide teaching at level of understanding  - Provide teaching via preferred learning methods  Outcome: Progressing     Problem: MOBILITY - ADULT  Goal: Maintain or return to baseline ADL function  Description: INTERVENTIONS:  -  Assess patient's ability to carry out ADLs; assess patient's baseline for ADL function and identify physical deficits which impact ability to perform ADLs (bathing, care of mouth/teeth, toileting, grooming, dressing, etc.)  - Assess/evaluate cause of self-care deficits   - Assess range of motion  - Assess patient's mobility; develop plan if impaired  - Assess patient's need for assistive devices and provide as appropriate  - Encourage maximum independence but intervene and supervise when necessary  - Involve family in performance of ADLs  - Assess for home care needs following discharge   - Consider OT consult to assist with ADL evaluation and planning for discharge  - Provide patient education as appropriate  Outcome: Progressing  Goal: Maintains/Returns to pre admission functional level  Description: INTERVENTIONS:  - Perform BMAT or MOVE assessment daily.   - Set and communicate daily mobility goal to care team and patient/family/caregiver.    - Collaborate with rehabilitation services on mobility goals if consulted  - Out of bed for toileting  - Record patient progress and toleration of activity level   Outcome: Progressing     Problem: Prexisting or High Potential for Compromised Skin Integrity  Goal: Skin integrity is maintained or improved  Description: INTERVENTIONS:  - Identify patients at risk for skin breakdown  - Assess and monitor skin integrity  - Assess and monitor nutrition and hydration status  - Monitor labs   - Assess for incontinence   - Turn and reposition patient  - Assist with mobility/ambulation  - Relieve pressure over bony prominences  - Avoid friction and shearing  - Provide appropriate hygiene as needed including keeping skin clean and dry  - Evaluate need for skin moisturizer/barrier cream  - Collaborate with interdisciplinary team   - Patient/family teaching  - Consider wound care consult   Outcome: Progressing

## 2023-09-25 NOTE — CASE MANAGEMENT
Case Management Assessment & Discharge Planning Note    Patient name Maci Tony  Location [facility] 828/[facility] 094-67 MRN [id number]  : 1940 Date 2023       Current Admission Date: 2023  Current Admission Diagnosis:Traumatic hematoma of left shoulder   Patient Active Problem List    Diagnosis Date Noted   • Fall 2023   • Traumatic hematoma of left shoulder 2023   • S/P aortic valve replacement 2023   • Epilepsy ([address]) 2023      LOS (days): 3  Geometric Mean LOS (GMLOS) (days): 2.50  Days to GMLOS:-0.8     OBJECTIVE:    Risk of Unplanned Readmission Score: 17.65         Current admission status: Inpatient       Preferred Pharmacy:   [facility] [id number] [place] [address]  [address] [facility] [address]  Phone: [phone] Fax: [phone]    Primary Care Provider: Anali Casiano MD    Primary Insurance: [facility]  Secondary Insurance:     ASSESSMENT:  Monica Proxies    There are no active Health Care Proxies on file. Advance Directives  Does patient have a [address]?: Yes  Does patient have Advance Directives?: Yes  Advance Directives: Living will, Power of  for health care  Primary Contact: [facility] [place] [phone] (h) [phone]    Readmission Root Cause  30 Day Readmission: No    Patient Information  Admitted from[de-identified] Home  Mental Status: Alert  During Assessment patient was accompanied by: Not accompanied during assessment  Assessment information provided by[de-identified] Patient  Primary Caregiver: Self  Support Systems: Self, [place] of Residence: [address] do you live in?: [address] [facility] entry access options.  Select all that apply.: No steps to enter home  Type of Current Residence: Facility  Upon entering residence, is there a bedroom on the main floor (no further steps)?: Yes  Upon entering residence, is there a bathroom on the main floor (no further steps)?: Yes  In the last 12 months, was there a time when you were not able to pay the mortgage or rent on time?: No  In the last 12 months, how many places have you lived?: 1  In the last 12 months, was there a time when you did not have a steady place to sleep or slept in a shelter (including now)?: No  Homeless/housing insecurity resource given?: N/A  Living Arrangements: Lives Alone  Is patient a ?: No    Activities of Daily Living Prior to Admission  Functional Status: Independent  Completes ADLs independently?: Yes  Ambulates independently?: Yes  Does patient use assisted devices?: Yes  Assisted Devices (DME) used: Walker, Rollator  Does patient currently own DME?: Yes  What DME does the patient currently own?: Laura Hutson  Does patient have a history of Outpatient Therapy (PT/OT)?: Yes  Does the patient have a history of Short-Term Rehab?: No  Does patient have a history of HHC?: No  Does patient currently have [facility]?: No    Patient Information Continued  Income Source: Pension/prison  Does patient have prescription coverage?: Yes  Within the past 12 months, you worried that your food would run out before you got the money to buy more.: Never true  Within the past 12 months, the food you bought just didn't last and you didn't have money to get more.: Never true  Food insecurity resource given?: N/A    Means of Transportation  In the past 12 months, has lack of transportation kept you from medical appointments or from getting medications?: No  In the past 12 months, has lack of transportation kept you from meetings, work, or from getting things needed for daily living?: No  Was application for public transport provided?: N/A    DISCHARGE DETAILS:    Discharge planning discussed with[de-identified] patient and POA  [place] of Choice: Yes     CM contacted family/caregiver?: Yes  Were Treatment Team discharge recommendations reviewed with patient/caregiver?: Yes  Did patient/caregiver verbalize understanding of patient care needs?: N/A- going to facility  Were patient/caregiver advised of the risks associated with not following Treatment Team discharge recommendations?: Yes    Contacts  Patient Contacts: [facility] [place]) [phone] (H) [phone]  Relationship to Patient[de-identified] Family  Contact Method: Phone  Phone Number: [phone] Rula Chahal  Reason/Outcome: Continuity of Care, Emergency [address]         Is the patient interested in [facility] at discharge?: No    DME Referral Provided  Referral made for DME?: No    Other Referral/Resources/Interventions Provided:  Interventions: SNF    Treatment Team Recommendation: SNF  Discharge Destination Plan[de-identified] SNF      Pt is from [facility].   Pt was evaluated by OT/PT and recommended for IP rehab  Pt prefers to return to [facility] if possible but if needing rehab would like referrals to [facility] and [facility]. CM placed referrals for SNF and will follow

## 2023-09-25 NOTE — PHYSICAL THERAPY NOTE
PHYSICAL THERAPY NOTE          Patient Name: Hussain GRIGGS Date: 9/25/2023 09/25/23 1135   PT Last Visit   PT Visit Date 09/25/23   Note Type   Note Type Treatment   Pain Assessment   Pain Assessment Tool 0-10   Pain Score 3   Pain Location/Orientation   (B/L Knee, L shoulder)   Pain Onset/Description Onset: Ongoing   Effect of Pain on Daily Activities Limited mobility   Patient's Stated Pain Goal No pain   Hospital Pain Intervention(s) Repositioned; Ambulation/increased activity   Restrictions/Precautions   Weight Bearing Precautions Per Order No   Other Precautions Chair Alarm; Bed Alarm;Multiple lines; Fall Risk;Pain;Hard of hearing   General   Chart Reviewed Yes   Cognition   Overall Cognitive Status WFL   Arousal/Participation Alert; Responsive   Attention Attends with cues to redirect   Orientation Level Oriented X4   Following Commands Follows one step commands with increased time or repetition   Comments Pt cooperative during session   Subjective   Subjective Agreeable to mobilize   Bed Mobility   Supine to Sit 4  Minimal assistance   Additional items Assist x 1;HOB elevated; Bedrails; Increased time required;Verbal cues;LE management   Sit to Supine Unable to assess   Additional Comments Pt noted to be in bed upon arrival.   Transfers   Sit to Stand 4  Minimal assistance   Additional items Assist x 1; Increased time required;Verbal cues   Stand to Sit 4  Minimal assistance   Additional items Assist x 1; Increased time required;Verbal cues   Additional Comments Completes STS with RW and assist x 1   Ambulation/Elevation   Gait pattern Improper Weight shift;Narrow TOMMY;Steppage; Short stride; Excessively slow   Gait Assistance 4  Minimal assist   Additional items Assist x 1;Verbal cues; Tactile cues   Assistive Device Rolling walker   Distance 4 ft   Ambulation/Elevation Additional Comments Pt completes ambulation with assist X 1, uses RW. Balance   Static Sitting Fair   Dynamic Sitting Fair -   Static Standing Fair -   Dynamic Standing Poor +   Ambulatory Poor   Endurance Deficit   Endurance Deficit Yes   Activity Tolerance   Activity Tolerance Patient limited by fatigue;Patient limited by pain   Nurse Made Aware RN cleared pt for therapy   Exercises   Hip Flexion Sitting;10 reps;Bilateral   Knee AROM Long Arc Quad Sitting;20 reps;Bilateral   Ankle Pumps 20 reps; Sitting;Bilateral   Assessment   Prognosis Good   Problem List Decreased strength;Decreased endurance;Decreased mobility;Pain   Assessment Pt seen for PT treatment session this date. Therapy session focused on bed mobility, functional transfers, and ambulation in order to improve overall mobility and independence. Pt also participates in seated therapeutic exercises, completes with minimal cueing. Pt requires Assist X 1 for mobility , with limiting factors being pain, generalized weakness, fatigue ,impaired mobility, gait deviations. Pt was left in chair at the end of PT session with all needs in reach. Pt would benefit from continued PT services while in hospital to address remaining limitations. The patient's AM-PAC Basic Mobility Inpatient Short Form Raw Score is 16. A Raw score of less than or equal to 16 suggests the patient may benefit from discharge to post-acute rehabilitation services. Please also refer to the recommendation of the Physical Therapist for safe discharge planning. Post dc recommendation is Rehab at this time. Barriers to Discharge Decreased caregiver support   Goals   Patient Goals to get better   STG Expiration Date 10/02/23   PT Treatment Day 1   Plan   Treatment/Interventions Functional transfer training; Therapeutic exercise;Patient/family training;Gait training;Spoke to nursing   Progress Slow progress, decreased activity tolerance   PT Frequency 3-5x/wk   Recommendation   PT Discharge Recommendation Post acute rehabilitation services Equipment Recommended [facility] Basic Mobility Inpatient   Turning in Flat Bed Without Bedrails 3   Lying on Back to Sitting on Edge of Flat Bed Without Bedrails 3   Moving Bed to Chair 3   Standing Up From Chair Using Arms 3   Walk in Room 2   Climb 3-5 Stairs With Railing 2   Basic Mobility Inpatient Raw Score 16   Basic Mobility Standardized Score 38.32   Highest Level Of Mobility   -HLM Goal 5: Stand one or more mins   JH-HLM Achieved 5: Stand (1 or more minutes)   Education   Education Provided Mobility training   Patient Demonstrates verbal understanding   End of Consult   Patient Position at End of Consult Bed/Chair alarm activated; All needs within reach; Bedside chair   Danielle Prima PT DPT

## 2023-09-26 PROBLEM — Z91.89 AT RISK FOR DELIRIUM: Status: ACTIVE | Noted: 2023-09-26

## 2023-09-26 LAB
ANION GAP SERPL CALCULATED.3IONS-SCNC: 6 MMOL/L
ANION GAP SERPL CALCULATED.3IONS-SCNC: 7 MMOL/L
ANION GAP SERPL CALCULATED.3IONS-SCNC: 8 MMOL/L
ANION GAP SERPL CALCULATED.3IONS-SCNC: 9 MMOL/L
BASOPHILS # BLD AUTO: 0.03 THOUSANDS/ÂΜL (ref 0–0.1)
BASOPHILS NFR BLD AUTO: 0 % (ref 0–1)
BUN SERPL-MCNC: 10 MG/DL (ref 5–25)
BUN SERPL-MCNC: 11 MG/DL (ref 5–25)
CALCIUM SERPL-MCNC: 7.8 MG/DL (ref 8.4–10.2)
CALCIUM SERPL-MCNC: 7.8 MG/DL (ref 8.4–10.2)
CALCIUM SERPL-MCNC: 8.1 MG/DL (ref 8.4–10.2)
CALCIUM SERPL-MCNC: 8.3 MG/DL (ref 8.4–10.2)
CHLORIDE SERPL-SCNC: 91 MMOL/L (ref 96–108)
CHLORIDE SERPL-SCNC: 92 MMOL/L (ref 96–108)
CHLORIDE SERPL-SCNC: 93 MMOL/L (ref 96–108)
CHLORIDE SERPL-SCNC: 93 MMOL/L (ref 96–108)
CO2 SERPL-SCNC: 24 MMOL/L (ref 21–32)
CO2 SERPL-SCNC: 24 MMOL/L (ref 21–32)
CO2 SERPL-SCNC: 26 MMOL/L (ref 21–32)
CO2 SERPL-SCNC: 27 MMOL/L (ref 21–32)
CREAT SERPL-MCNC: 0.58 MG/DL (ref 0.6–1.3)
CREAT SERPL-MCNC: 0.61 MG/DL (ref 0.6–1.3)
CREAT SERPL-MCNC: 0.64 MG/DL (ref 0.6–1.3)
CREAT SERPL-MCNC: 0.7 MG/DL (ref 0.6–1.3)
EOSINOPHIL # BLD AUTO: 0.03 THOUSAND/ÂΜL (ref 0–0.61)
EOSINOPHIL NFR BLD AUTO: 0 % (ref 0–6)
ERYTHROCYTE [DISTWIDTH] IN BLOOD BY AUTOMATED COUNT: 15.1 % (ref 11.6–15.1)
GFR SERPL CREATININE-BSD FRML MDRD: 80 ML/MIN/1.73SQ M
GFR SERPL CREATININE-BSD FRML MDRD: 82 ML/MIN/1.73SQ M
GFR SERPL CREATININE-BSD FRML MDRD: 84 ML/MIN/1.73SQ M
GFR SERPL CREATININE-BSD FRML MDRD: 85 ML/MIN/1.73SQ M
GLUCOSE SERPL-MCNC: 101 MG/DL (ref 65–140)
GLUCOSE SERPL-MCNC: 111 MG/DL (ref 65–140)
GLUCOSE SERPL-MCNC: 114 MG/DL (ref 65–140)
GLUCOSE SERPL-MCNC: 139 MG/DL (ref 65–140)
HCT VFR BLD AUTO: 26 % (ref 34.8–46.1)
HGB BLD-MCNC: 8.7 G/DL (ref 11.5–15.4)
IMM GRANULOCYTES # BLD AUTO: 0.12 THOUSAND/UL (ref 0–0.2)
IMM GRANULOCYTES NFR BLD AUTO: 1 % (ref 0–2)
INR PPP: 1.46 (ref 0.84–1.19)
LYMPHOCYTES # BLD AUTO: 0.74 THOUSANDS/ÂΜL (ref 0.6–4.47)
LYMPHOCYTES NFR BLD AUTO: 6 % (ref 14–44)
MCH RBC QN AUTO: 30.4 PG (ref 26.8–34.3)
MCHC RBC AUTO-ENTMCNC: 33.5 G/DL (ref 31.4–37.4)
MCV RBC AUTO: 91 FL (ref 82–98)
MONOCYTES # BLD AUTO: 1.09 THOUSAND/ÂΜL (ref 0.17–1.22)
MONOCYTES NFR BLD AUTO: 9 % (ref 4–12)
NEUTROPHILS # BLD AUTO: 10.4 THOUSANDS/ÂΜL (ref 1.85–7.62)
NEUTS SEG NFR BLD AUTO: 84 % (ref 43–75)
NRBC BLD AUTO-RTO: 0 /100 WBCS
OSMOLALITY UR/SERPL-RTO: 261 MMOL/KG (ref 282–298)
OSMOLALITY UR: 352 MMOL/KG
PLATELET # BLD AUTO: 213 THOUSANDS/UL (ref 149–390)
PMV BLD AUTO: 9.9 FL (ref 8.9–12.7)
POTASSIUM SERPL-SCNC: 3.7 MMOL/L (ref 3.5–5.3)
POTASSIUM SERPL-SCNC: 3.7 MMOL/L (ref 3.5–5.3)
POTASSIUM SERPL-SCNC: 4 MMOL/L (ref 3.5–5.3)
POTASSIUM SERPL-SCNC: 4 MMOL/L (ref 3.5–5.3)
PROTHROMBIN TIME: 18 SECONDS (ref 11.6–14.5)
RBC # BLD AUTO: 2.86 MILLION/UL (ref 3.81–5.12)
SODIUM 24H UR-SCNC: 19 MOL/L
SODIUM SERPL-SCNC: 124 MMOL/L (ref 135–147)
SODIUM SERPL-SCNC: 125 MMOL/L (ref 135–147)
SODIUM SERPL-SCNC: 125 MMOL/L (ref 135–147)
SODIUM SERPL-SCNC: 126 MMOL/L (ref 135–147)
WBC # BLD AUTO: 12.41 THOUSAND/UL (ref 4.31–10.16)

## 2023-09-26 PROCEDURE — 85025 COMPLETE CBC W/AUTO DIFF WBC: CPT

## 2023-09-26 PROCEDURE — 83935 ASSAY OF URINE OSMOLALITY: CPT

## 2023-09-26 PROCEDURE — 84300 ASSAY OF URINE SODIUM: CPT

## 2023-09-26 PROCEDURE — 80048 BASIC METABOLIC PNL TOTAL CA: CPT

## 2023-09-26 PROCEDURE — 80048 BASIC METABOLIC PNL TOTAL CA: CPT | Performed by: SURGERY

## 2023-09-26 PROCEDURE — 99232 SBSQ HOSP IP/OBS MODERATE 35: CPT | Performed by: SURGERY

## 2023-09-26 PROCEDURE — 83930 ASSAY OF BLOOD OSMOLALITY: CPT

## 2023-09-26 PROCEDURE — 85610 PROTHROMBIN TIME: CPT

## 2023-09-26 RX ORDER — SODIUM CHLORIDE 1 G/1
2 TABLET ORAL
Status: DISCONTINUED | OUTPATIENT
Start: 2023-09-27 | End: 2023-09-29 | Stop reason: HOSPADM

## 2023-09-26 RX ORDER — SODIUM CHLORIDE 9 MG/ML
100 INJECTION, SOLUTION INTRAVENOUS CONTINUOUS
Status: DISCONTINUED | OUTPATIENT
Start: 2023-09-26 | End: 2023-09-27

## 2023-09-26 RX ADMIN — WARFARIN SODIUM 2.5 MG: 2.5 TABLET ORAL at 17:07

## 2023-09-26 RX ADMIN — ACETAMINOPHEN 650 MG: 325 TABLET, FILM COATED ORAL at 22:16

## 2023-09-26 RX ADMIN — ENOXAPARIN SODIUM 60 MG: 60 INJECTION SUBCUTANEOUS at 21:56

## 2023-09-26 RX ADMIN — OXYBUTYNIN CHLORIDE 5 MG: 5 TABLET ORAL at 08:45

## 2023-09-26 RX ADMIN — SERTRALINE 100 MG: 100 TABLET, FILM COATED ORAL at 08:45

## 2023-09-26 RX ADMIN — PANTOPRAZOLE SODIUM 20 MG: 20 TABLET, DELAYED RELEASE ORAL at 05:41

## 2023-09-26 RX ADMIN — SODIUM CHLORIDE 100 ML/HR: 0.9 INJECTION, SOLUTION INTRAVENOUS at 17:07

## 2023-09-26 RX ADMIN — ATORVASTATIN CALCIUM 80 MG: 80 TABLET, FILM COATED ORAL at 08:45

## 2023-09-26 RX ADMIN — DILTIAZEM HYDROCHLORIDE 90 MG: 90 CAPSULE, EXTENDED RELEASE ORAL at 08:46

## 2023-09-26 RX ADMIN — LAMOTRIGINE 100 MG: 100 TABLET ORAL at 21:56

## 2023-09-26 RX ADMIN — SODIUM CHLORIDE 500 ML: 0.9 INJECTION, SOLUTION INTRAVENOUS at 11:14

## 2023-09-26 RX ADMIN — LAMOTRIGINE 100 MG: 100 TABLET ORAL at 08:45

## 2023-09-26 RX ADMIN — DILTIAZEM HYDROCHLORIDE 90 MG: 90 CAPSULE, EXTENDED RELEASE ORAL at 21:58

## 2023-09-26 RX ADMIN — FLUTICASONE FUROATE AND VILANTEROL TRIFENATATE 1 PUFF: 100; 25 POWDER RESPIRATORY (INHALATION) at 08:46

## 2023-09-26 RX ADMIN — ENOXAPARIN SODIUM 60 MG: 60 INJECTION SUBCUTANEOUS at 08:45

## 2023-09-26 RX ADMIN — OXYCODONE HYDROCHLORIDE AND ACETAMINOPHEN 500 MG: 500 TABLET ORAL at 08:45

## 2023-09-26 RX ADMIN — LEVOTHYROXINE SODIUM 125 MCG: 125 TABLET ORAL at 05:41

## 2023-09-26 RX ADMIN — OXYBUTYNIN CHLORIDE 5 MG: 5 TABLET ORAL at 17:07

## 2023-09-26 NOTE — RESTORATIVE TECHNICIAN NOTE
Restorative Technician Note      Patient Name: Ning Kate     [facility] Tech Visit Date: 09/26/23  Note Type: Mobility  Patient Position Upon Consult: Supine  Activity Performed: Ambulated; Other (Comment) (within room due to decreased staff for chair follow)  Assistive Device: Standard walker  Education Provided: Yes  Patient Position at End of Consult: Bedside chair;  All needs within reach    Kaitlin Sweet  DPT, Restorative Technician

## 2023-09-26 NOTE — CASE MANAGEMENT
Case Management Discharge Planning Note    Patient name Toya Frost  [facility] [id number]  : 1940 Date 2023       Current Admission Date: 2023  Current Admission Diagnosis:Traumatic hematoma of left shoulder   Patient Active Problem List    Diagnosis Date Noted   • Fall 2023   • Traumatic hematoma of left shoulder 2023   • S/P aortic valve replacement 2023   • Epilepsy ([address]) 2023      LOS (days): 4  Geometric Mean LOS (GMLOS) (days): 2.50  Days to GMLOS:-1.7     OBJECTIVE:  Risk of Unplanned Readmission Score: 17.86         Current admission status: Inpatient   Preferred Pharmacy:   [address] [address]  Phone: [phone] Fax: [phone]    Primary Care Provider: Shasha Beltran MD    Primary Insurance: [facility]  Secondary Insurance:     DISCHARGE DETAILS:        CM spoke to liaison from [facility]. They're amenable to having the pt return to their facility but the stipulation would be that the pt would have to use a wheelchair for all mobility due to safety and not enough staffing to provide assistance with mobility. In talking with the pt's POA, he wasn't keen on this d/c as he feels the pt can't likely mobilize independently in a wheelchair effectively. He would like CM to ask the pt her thoughts, and if pt wants to return there, then we can facilitate this.  Pt does have a wheelchair at her facility     Pt accepted to [facility] but awaiting answer from [address] [place] [address] (first choice)

## 2023-09-26 NOTE — ASSESSMENT & PLAN NOTE
· On warfarin, held initially due to bleed  · Warfarin resumed on 9/23, bridge with therapeutic lovenox, goal INR 2.5

## 2023-09-26 NOTE — CASE MANAGEMENT
Case Management Discharge Planning Note    Patient name Ning [place]  Location [facility] 828/[facility] 227-07 MRN [id number]  : 1940 Date 2023       Current Admission Date: 2023  Current Admission Diagnosis:Traumatic hematoma of left shoulder   Patient Active Problem List    Diagnosis Date Noted   • Fall 2023   • Traumatic hematoma of left shoulder 2023   • S/P aortic valve replacement 2023   • Epilepsy ([address]) 2023      LOS (days): 4  Geometric Mean LOS (GMLOS) (days): 2.50  Days to GMLOS:-1.8     OBJECTIVE:  Risk of Unplanned Readmission Score: 18.09         Current admission status: Inpatient   Preferred Pharmacy:   [address] [address]  Phone: [phone] Fax: [phone]    Primary Care Provider: Juan Lozano MD    Primary Insurance: Rodrigue Camacho [facility]  Secondary Insurance:     DISCHARGE DETAILS:    Discharge planning discussed with[de-identified] patient at bedside  Freedom of Choice: Yes  Comments - Freedom of Choice: This CM followed up with pt at bedside. She does not want to return to [address] [place] [address] if she needs to use a wheelchair. Her first choice is [facility]. CM sent message in [facility] to [address] [place] [address] to see if they have a bed available and if they can accept. Await response.  Will need auth       Other Referral/Resources/Interventions Provided:  Interventions: Short Term Rehab         Treatment Team Recommendation: Short Term Rehab  Discharge Destination Plan[de-identified] Short Term Rehab

## 2023-09-26 NOTE — ASSESSMENT & PLAN NOTE
· Patient noted to be intermittently confused on initial assessment by geriatrics. · Patient has remained alert and oriented. · No evidence of metabolic encephalopathy.

## 2023-09-26 NOTE — PLAN OF CARE
Problem: PAIN - ADULT  Goal: Verbalizes/displays adequate comfort level or baseline comfort level  Description: Interventions:  - Encourage patient to monitor pain and request assistance  - Assess pain using appropriate pain scale  - Administer analgesics based on type and severity of pain and evaluate response  - Implement non-pharmacological measures as appropriate and evaluate response  - Consider cultural and social influences on pain and pain management  - Notify physician/advanced practitioner if interventions unsuccessful or patient reports new pain  Outcome: Progressing     Problem: INFECTION - ADULT  Goal: Absence or prevention of progression during hospitalization  Description: INTERVENTIONS:  - Assess and monitor for signs and symptoms of infection  - Monitor lab/diagnostic results  - Monitor all insertion sites, i.e. indwelling lines, tubes, and drains  - Monitor endotracheal if appropriate and nasal secretions for changes in amount and color  - Powell appropriate cooling/warming therapies per order  - Administer medications as ordered  - Instruct and encourage patient and family to use good hand hygiene technique  - Identify and instruct in appropriate isolation precautions for identified infection/condition  Outcome: Progressing  Goal: Absence of fever/infection during neutropenic period  Description: INTERVENTIONS:  - Monitor WBC    Outcome: Progressing     Problem: SAFETY ADULT  Goal: Patient will remain free of falls  Description: INTERVENTIONS:  - Educate patient/family on patient safety including physical limitations  - Instruct patient to call for assistance with activity   - Consult OT/PT to assist with strengthening/mobility   - Keep Call bell within reach  - Keep bed low and locked with side rails adjusted as appropriate  - Keep care items and personal belongings within reach  - Initiate and maintain comfort rounds  - Make Fall Risk Sign visible to staff  - Offer Toileting every 2 Hours, in advance of need  - Initiate/Maintain bed alarm  - Obtain necessary fall risk management equipment: non-skid socks  - Apply yellow socks and bracelet for high fall risk patients  - Consider moving patient to room near nurses station  Outcome: Progressing  Goal: Maintain or return to baseline ADL function  Description: INTERVENTIONS:  -  Assess patient's ability to carry out ADLs; assess patient's baseline for ADL function and identify physical deficits which impact ability to perform ADLs (bathing, care of mouth/teeth, toileting, grooming, dressing, etc.)  - Assess/evaluate cause of self-care deficits   - Assess range of motion  - Assess patient's mobility; develop plan if impaired  - Assess patient's need for assistive devices and provide as appropriate  - Encourage maximum independence but intervene and supervise when necessary  - Involve family in performance of ADLs  - Assess for home care needs following discharge   - Consider OT consult to assist with ADL evaluation and planning for discharge  - Provide patient education as appropriate  Outcome: Progressing  Goal: Maintains/Returns to pre admission functional level  Description: INTERVENTIONS:  - Perform BMAT or MOVE assessment daily.   - Set and communicate daily mobility goal to care team and patient/family/caregiver. - Collaborate with rehabilitation services on mobility goals if consulted  - Perform Range of Motion 3 times a day. - Reposition patient every 2 hours.   - Dangle patient 3 times a day  - Stand patient 3 times a day  - Ambulate patient 3 times a day  - Out of bed to chair 3 times a day   - Out of bed for meals 3 times a day  - Out of bed for toileting  - Record patient progress and toleration of activity level   Outcome: Progressing

## 2023-09-26 NOTE — PROGRESS NOTES
[facility]  Progress Note  Name: Tremayne Butler  MRN: [id number]  Unit/Bed#: [facility] 784-39 I Date of Admission: 9/22/2023   Date of Service: 9/26/2023 I Hospital Day: 4    Assessment/Plan   * Traumatic hematoma of left shoulder  Assessment & Plan  · With associated laceration status post bedside closure 9/22  · Compression wrap applied  · Trend hgb   · Monitor for signs infection    Fall  Assessment & Plan  · Pt/ OT eval, recommending rehab    At risk for delirium  Assessment & Plan  · Patient noted to be intermittently confused on initial assessment by geriatrics. · Patient has remained alert and oriented. · No evidence of metabolic encephalopathy. Epilepsy ([address])  Assessment & Plan  · Continue home meds    S/P aortic valve replacement  Assessment & Plan  · On warfarin, held initially due to bleed  · Warfarin resumed on 9/23, bridge with therapeutic lovenox, goal INR 2.5           Bowel Regimen: Colace  VTE Prophylaxis:Enoxaparin (Lovenox)     Disposition: Rehab    Subjective   Chief Complaint: Pain well controlled         Objective   Vitals:   Temp:  [98 °F (36.7 °C)-100.1 °F (37.8 °C)] 98 °F (36.7 °C)  HR:  [] 94  Resp:  [15-24] 15  BP: (110-144)/(64-87) 110/64    I/O       09/24 0701  09/25 0700 09/25 0701  09/26 0700 09/26 0701  09/27 0700    P. O. 624      Total Intake(mL/kg) 624 (10.9)      Urine (mL/kg/hr) 650 (0.5) 350 (0.3)     Stool  0     Total Output 650 350     Net -26 -350            Unmeasured Stool Occurrence  1 x            Physical Exam:   GENERAL APPEARANCE: NAD  NEURO: Awake and alert, no focal deficits  HEENT: Normocephalic, atraumatic. Mucous membranes moist  CV: Regular rate and rhythm  LUNGS: Clear to auscultation bilaterally  GI: Soft and nontender  MSK: Moving all extremities  SKIN: Warm and dry.   Bruising of the left shoulder    Invasive Devices     Peripheral Intravenous Line  Duration           Peripheral IV 09/23/23 Distal;Left;Ventral (anterior) Forearm 2 days          Drain  Duration           External Urinary Catheter 1 day                      Lab Results: Results: I have personally reviewed all pertinent laboratory/tests results  Imaging: I have personally reviewed pertinent reports.     Other Studies: None

## 2023-09-27 PROBLEM — E87.1 HYPONATREMIA: Status: ACTIVE | Noted: 2023-09-27

## 2023-09-27 LAB
ANION GAP SERPL CALCULATED.3IONS-SCNC: 4 MMOL/L
ANION GAP SERPL CALCULATED.3IONS-SCNC: 5 MMOL/L
ANION GAP SERPL CALCULATED.3IONS-SCNC: 6 MMOL/L
BASOPHILS # BLD AUTO: 0.03 THOUSANDS/ÂΜL (ref 0–0.1)
BASOPHILS NFR BLD AUTO: 0 % (ref 0–1)
BUN SERPL-MCNC: 8 MG/DL (ref 5–25)
BUN SERPL-MCNC: 8 MG/DL (ref 5–25)
BUN SERPL-MCNC: 9 MG/DL (ref 5–25)
CALCIUM SERPL-MCNC: 7.7 MG/DL (ref 8.4–10.2)
CALCIUM SERPL-MCNC: 7.7 MG/DL (ref 8.4–10.2)
CALCIUM SERPL-MCNC: 7.9 MG/DL (ref 8.4–10.2)
CHLORIDE SERPL-SCNC: 95 MMOL/L (ref 96–108)
CHLORIDE SERPL-SCNC: 98 MMOL/L (ref 96–108)
CHLORIDE SERPL-SCNC: 99 MMOL/L (ref 96–108)
CO2 SERPL-SCNC: 25 MMOL/L (ref 21–32)
CO2 SERPL-SCNC: 27 MMOL/L (ref 21–32)
CO2 SERPL-SCNC: 28 MMOL/L (ref 21–32)
CREAT SERPL-MCNC: 0.5 MG/DL (ref 0.6–1.3)
CREAT SERPL-MCNC: 0.53 MG/DL (ref 0.6–1.3)
CREAT SERPL-MCNC: 0.53 MG/DL (ref 0.6–1.3)
EOSINOPHIL # BLD AUTO: 0.13 THOUSAND/ÂΜL (ref 0–0.61)
EOSINOPHIL NFR BLD AUTO: 1 % (ref 0–6)
ERYTHROCYTE [DISTWIDTH] IN BLOOD BY AUTOMATED COUNT: 15.1 % (ref 11.6–15.1)
GFR SERPL CREATININE-BSD FRML MDRD: 88 ML/MIN/1.73SQ M
GFR SERPL CREATININE-BSD FRML MDRD: 88 ML/MIN/1.73SQ M
GFR SERPL CREATININE-BSD FRML MDRD: 89 ML/MIN/1.73SQ M
GLUCOSE SERPL-MCNC: 102 MG/DL (ref 65–140)
GLUCOSE SERPL-MCNC: 114 MG/DL (ref 65–140)
GLUCOSE SERPL-MCNC: 91 MG/DL (ref 65–140)
HCT VFR BLD AUTO: 26.8 % (ref 34.8–46.1)
HGB BLD-MCNC: 8.7 G/DL (ref 11.5–15.4)
IMM GRANULOCYTES # BLD AUTO: 0.11 THOUSAND/UL (ref 0–0.2)
IMM GRANULOCYTES NFR BLD AUTO: 1 % (ref 0–2)
INR PPP: 1.57 (ref 0.84–1.19)
LYMPHOCYTES # BLD AUTO: 0.67 THOUSANDS/ÂΜL (ref 0.6–4.47)
LYMPHOCYTES NFR BLD AUTO: 7 % (ref 14–44)
MAGNESIUM SERPL-MCNC: 1.9 MG/DL (ref 1.9–2.7)
MCH RBC QN AUTO: 30.5 PG (ref 26.8–34.3)
MCHC RBC AUTO-ENTMCNC: 32.5 G/DL (ref 31.4–37.4)
MCV RBC AUTO: 94 FL (ref 82–98)
MONOCYTES # BLD AUTO: 1.13 THOUSAND/ÂΜL (ref 0.17–1.22)
MONOCYTES NFR BLD AUTO: 11 % (ref 4–12)
NEUTROPHILS # BLD AUTO: 7.92 THOUSANDS/ÂΜL (ref 1.85–7.62)
NEUTS SEG NFR BLD AUTO: 80 % (ref 43–75)
NRBC BLD AUTO-RTO: 0 /100 WBCS
PLATELET # BLD AUTO: 221 THOUSANDS/UL (ref 149–390)
PMV BLD AUTO: 10 FL (ref 8.9–12.7)
POTASSIUM SERPL-SCNC: 3.5 MMOL/L (ref 3.5–5.3)
POTASSIUM SERPL-SCNC: 3.9 MMOL/L (ref 3.5–5.3)
POTASSIUM SERPL-SCNC: 4 MMOL/L (ref 3.5–5.3)
PROTHROMBIN TIME: 19 SECONDS (ref 11.6–14.5)
RBC # BLD AUTO: 2.85 MILLION/UL (ref 3.81–5.12)
SODIUM SERPL-SCNC: 128 MMOL/L (ref 135–147)
SODIUM SERPL-SCNC: 129 MMOL/L (ref 135–147)
SODIUM SERPL-SCNC: 130 MMOL/L (ref 135–147)
WBC # BLD AUTO: 9.99 THOUSAND/UL (ref 4.31–10.16)

## 2023-09-27 PROCEDURE — 80048 BASIC METABOLIC PNL TOTAL CA: CPT | Performed by: INTERNAL MEDICINE

## 2023-09-27 PROCEDURE — 85610 PROTHROMBIN TIME: CPT | Performed by: STUDENT IN AN ORGANIZED HEALTH CARE EDUCATION/TRAINING PROGRAM

## 2023-09-27 PROCEDURE — 97116 GAIT TRAINING THERAPY: CPT

## 2023-09-27 PROCEDURE — 85025 COMPLETE CBC W/AUTO DIFF WBC: CPT | Performed by: STUDENT IN AN ORGANIZED HEALTH CARE EDUCATION/TRAINING PROGRAM

## 2023-09-27 PROCEDURE — 97530 THERAPEUTIC ACTIVITIES: CPT

## 2023-09-27 PROCEDURE — 99222 1ST HOSP IP/OBS MODERATE 55: CPT | Performed by: INTERNAL MEDICINE

## 2023-09-27 PROCEDURE — 97535 SELF CARE MNGMENT TRAINING: CPT

## 2023-09-27 PROCEDURE — 80048 BASIC METABOLIC PNL TOTAL CA: CPT | Performed by: STUDENT IN AN ORGANIZED HEALTH CARE EDUCATION/TRAINING PROGRAM

## 2023-09-27 PROCEDURE — 99232 SBSQ HOSP IP/OBS MODERATE 35: CPT | Performed by: STUDENT IN AN ORGANIZED HEALTH CARE EDUCATION/TRAINING PROGRAM

## 2023-09-27 PROCEDURE — 80048 BASIC METABOLIC PNL TOTAL CA: CPT

## 2023-09-27 PROCEDURE — 83735 ASSAY OF MAGNESIUM: CPT | Performed by: STUDENT IN AN ORGANIZED HEALTH CARE EDUCATION/TRAINING PROGRAM

## 2023-09-27 RX ADMIN — PANTOPRAZOLE SODIUM 20 MG: 20 TABLET, DELAYED RELEASE ORAL at 05:39

## 2023-09-27 RX ADMIN — OXYBUTYNIN CHLORIDE 5 MG: 5 TABLET ORAL at 09:11

## 2023-09-27 RX ADMIN — FLUTICASONE FUROATE AND VILANTEROL TRIFENATATE 1 PUFF: 100; 25 POWDER RESPIRATORY (INHALATION) at 09:14

## 2023-09-27 RX ADMIN — ATORVASTATIN CALCIUM 80 MG: 80 TABLET, FILM COATED ORAL at 09:10

## 2023-09-27 RX ADMIN — ENOXAPARIN SODIUM 60 MG: 60 INJECTION SUBCUTANEOUS at 09:14

## 2023-09-27 RX ADMIN — SODIUM CHLORIDE 2 G: 1 TABLET ORAL at 09:10

## 2023-09-27 RX ADMIN — LEVOTHYROXINE SODIUM 125 MCG: 125 TABLET ORAL at 05:39

## 2023-09-27 RX ADMIN — LAMOTRIGINE 100 MG: 100 TABLET ORAL at 20:54

## 2023-09-27 RX ADMIN — SERTRALINE 100 MG: 100 TABLET, FILM COATED ORAL at 09:11

## 2023-09-27 RX ADMIN — SODIUM CHLORIDE 2 G: 1 TABLET ORAL at 17:14

## 2023-09-27 RX ADMIN — OXYCODONE HYDROCHLORIDE 5 MG: 5 TABLET ORAL at 05:39

## 2023-09-27 RX ADMIN — DOCUSATE SODIUM 100 MG: 100 CAPSULE, LIQUID FILLED ORAL at 17:15

## 2023-09-27 RX ADMIN — DILTIAZEM HYDROCHLORIDE 90 MG: 90 CAPSULE, EXTENDED RELEASE ORAL at 20:54

## 2023-09-27 RX ADMIN — SODIUM CHLORIDE 2 G: 1 TABLET ORAL at 12:43

## 2023-09-27 RX ADMIN — OXYCODONE HYDROCHLORIDE AND ACETAMINOPHEN 500 MG: 500 TABLET ORAL at 09:11

## 2023-09-27 RX ADMIN — LAMOTRIGINE 100 MG: 100 TABLET ORAL at 09:10

## 2023-09-27 RX ADMIN — WARFARIN SODIUM 2.5 MG: 2.5 TABLET ORAL at 17:14

## 2023-09-27 RX ADMIN — OXYBUTYNIN CHLORIDE 5 MG: 5 TABLET ORAL at 17:14

## 2023-09-27 RX ADMIN — ENOXAPARIN SODIUM 60 MG: 60 INJECTION SUBCUTANEOUS at 20:54

## 2023-09-27 RX ADMIN — SODIUM CHLORIDE 100 ML/HR: 0.9 INJECTION, SOLUTION INTRAVENOUS at 03:30

## 2023-09-27 NOTE — CONSULTS
Consultation    Nephrology   [facility] DAYBREAK [facility] 80 y.o. female MRN: [id number]  Unit/Bed#: [facility] 828-01 Encounter: [id number]    History of Present Illness   Physician Requesting Consult: Zee Pelaez DO  Reason for Consult : -Acute on chronic hyponatremia*     ASSESSMENT:   80 y.o.  female with medical history of hypertension, GERD, anxiety, mechanical aortic valve replacement, COPD, A-fib, pacemaker, hyperlipidemia, SAM and epilepsy who was admitted for trauma eval status post fall with left shoulder laceration. Nephrology has been consulted for evaluation and management acute hyponatremia     Acute on chronic hyponatremia:   Baseline sodium 130-135 meq  Admission sodium level of 135 meq on 9/22. Most recent sodium level was 128 meq at 5 AM on 9/27  Etiology of hyponatremia multifactorial likely due to pre-renal azotemia + medication induced (Zoloft )+ increased ADH due to pain + SIADH (pulmonary nodules). plasma osmolality = 261  urine osmolality = 352  urine sodium = 19  No acute indication for Hypertonic saline (HTS) at this time. DC normal saline for now  Continue salt tabs 2 g p.o. 3 times daily for now  Consider decreasing Zoloft dosage if tolerable to maintain serum sodium greater than 130 mEq  Check BMP every 12  Place on fluid restriction of 1.5L/day  Strict I/O. Call if sodium is greater than 136 or less than 128 in the next 24 hours. Baseline Creatinine of 0.5-0.7 mg/dL  Most recent creatinine is 0.50 Mg/dL at risk for DEON secondary to contrast exposure 9/22/2023      H&H/anemia:  most recent hemoglobin at 8.7 g/dL  Maintain hemoglobin greater than 8 grams/deciliter  As per primary team check CBC    Acid-base electrolytes:  Hyponatremia: See above  Acid-base: Recent bicarb stable at 28    Blood pressure hypertension/A-fib:   Optimize hemodynamic status to avoid delay in renal recovery. Maintain MAP > 65mmHg  Avoid BP fluctuations.   Currently on diltiazem 90 mg p.o. every 12  At home is on Lasix 40 mg p.o. daily hold for now May consider restarting in next 24 to 48 hours. DC IV fluids for now    Other medical problems:  Mechanical aortic valve:  Management per primary team.  On Coumadin  Status post fall/shoulder laceration intramuscular hemorrhage in the trapezius muscle:  Management per primary team.  Follow-up with trauma      Plan below is what was discussed with the primary team that they agree with:  • check magnesium, phosphorus in a.m. • Check BMP every 12 call if serum sodium greater than 136 or less than 128 mEq  • Fluid restrict 1.5 L/day  • DC normal saline  • Continue salt tablets 2 g p.o. 3 times daily  • Hold home Lasix for now    Thanks for the consult  Will continue to follow  Please call with questions/ concerns. Fabiola Israel MD, FASN, 9/27/2023, 12:25 PM          HISTORY OF PRESENT ILLNESS:   Santy Ruffin is a 80 y.o.  female with medical history of hypertension, GERD, anxiety, mechanical aortic valve replacement, COPD, A-fib, pacemaker, hyperlipidemia, SAM and epilepsy who was admitted for trauma eval status post fall with left shoulder laceration. Nephrology has been consulted for evaluation and management acute hyponatremia review of record shows patient baseline serum sodium 130 to 135 mEq admitted with serum sodium 135 mEq on 9/22. Most recent serum sodium today at 128 mEq. Patient on Zoloft 100 mg p.o. daily. After admission received IV fluids this a.m. was on saline 100 cc an hour as well as salt tablets 2 g p.o. 3 times daily. Patient reports no NSAID use does not recall how long she has been on Zoloft no overt complaints other than urinary frequency. History obtained from chart review and the patient    Consults    Review of Systems   Constitutional: Negative for chills and fever. HENT: Negative for congestion. Respiratory: Negative for cough and shortness of breath. Cardiovascular: Negative for leg swelling.    Gastrointestinal: Negative for abdominal pain and diarrhea. Genitourinary: Positive for frequency. Negative for hematuria. Musculoskeletal: Negative for back pain. Neurological: Negative for headaches. Psychiatric/Behavioral: Negative for agitation and confusion. All other systems reviewed and are negative.        Historical Information   Patient Active Problem List   Diagnosis   • Fall   • Traumatic hematoma of left shoulder   • S/P aortic valve replacement   • Epilepsy ([address])   • At risk for delirium   • Hyponatremia     Past Medical History:   Diagnosis Date   • Anemia    • Anxiety disorder, unspecified    • Anxiety disorder, unspecified    • Cardiac arrest, cause unspecified ([address])    • Chronic obstructive pulmonary disease, unspecified ([address])    • Constipation    • Essential (primary) hypertension    • GERD (gastroesophageal reflux disease)    • Hypo-osmolality and hyponatremia    • Hypothyroidism    • Nontraumatic hematoma of soft tissue    • Other seizures (HCC)    • Persistent atrial fibrillation (HCC)    • Unspecified urinary incontinence      Past Surgical History:   Procedure Laterality Date   • APPENDECTOMY     • CARDIAC PACEMAKER PLACEMENT     • FEMUR FRACTURE SURGERY       Social History   Social History     Substance and Sexual Activity   Alcohol Use Not Currently     Social History     Substance and Sexual Activity   Drug Use Never     Social History     Tobacco Use   Smoking Status Never   Smokeless Tobacco Never     Family History   Family history unknown: Yes       Meds/Allergies   current meds:   Current Facility-Administered Medications   Medication Dose Route Frequency   • acetaminophen (TYLENOL) tablet 650 mg  650 mg Oral Q6H PRN   • albuterol (PROVENTIL HFA,VENTOLIN HFA) inhaler 2 puff  2 puff Inhalation Q6H PRN   • ascorbic acid (VITAMIN C) tablet 500 mg  500 mg Oral Daily   • atorvastatin (LIPITOR) tablet 80 mg  80 mg Oral Daily   • diltiazem (CARDIZEM SR) 12 hr capsule 90 mg  90 mg Oral Q12H [address]   • docusate sodium (COLACE) capsule 100 mg  100 mg Oral BID   • enoxaparin (LOVENOX) subcutaneous injection 60 mg  1 mg/kg Subcutaneous Q12H [facility]   • Fluticasone Furoate-Vilanterol 100-25 mcg/actuation 1 puff  1 puff Inhalation Daily   • HYDROmorphone HCl (DILAUDID) injection 0.2 mg  0.2 mg Intravenous Q3H PRN   • lactase (LACTAID) tablet 9,000 Units  9,000 Units Oral TID PRN   • lamoTRIgine (LaMICtal) tablet 100 mg  100 mg Oral BID   • levothyroxine tablet 125 mcg  125 mcg Oral Early Morning   • oxybutynin (DITROPAN) tablet 5 mg  5 mg Oral BID   • oxyCODONE (ROXICODONE) IR tablet 5 mg  5 mg Oral Q6H PRN   • oxyCODONE (ROXICODONE) split tablet 2.5 mg  2.5 mg Oral Q6H PRN   • pantoprazole (PROTONIX) EC tablet 20 mg  20 mg Oral Early Morning   • sertraline (ZOLOFT) tablet 100 mg  100 mg Oral Daily   • sodium chloride tablet 2 g  2 g Oral TID With Meals   • warfarin (COUMADIN) tablet 2.5 mg  2.5 mg Oral Daily (warfarin)    and PTA meds:   Prior to Admission Medications   Prescriptions Last Dose Informant Patient Reported? Taking? Fluticasone-Salmeterol (Advair) 100-50 mcg/dose inhaler 9/24/2023  Yes Yes   Sig: Inhale 1 puff 2 (two) times a day Rinse mouth after use.    Lidocaine 4 % PT[facility] Unknown  Yes No   Sig: Apply 1 patch topically daily at bedtime On for 12 hrs, off for 12 hrs   Multiple Vitamin (multivitamin) tablet Unknown  Yes No   Sig: Take 1 tablet by mouth daily   acetaminophen (TYLENOL) 325 mg tablet 9/23/2023  Yes Yes   Sig: Take 650 mg by mouth every 8 (eight) hours as needed for mild pain   acetaminophen (TYLENOL) 500 mg tablet Unknown  Yes No   Sig: Take 500 mg by mouth every 12 (twelve) hours as needed for mild pain   albuterol (PROVENTIL HFA,VENTOLIN HFA) 90 mcg/act inhaler Unknown  Yes No   Sig: Inhale 2 puffs every 6 (six) hours as needed for wheezing   ascorbic acid (VITAMIN C) 500 MG tablet 9/24/2023  Yes Yes   Sig: Take 500 mg by mouth daily   atorvastatin (LIPITOR) 80 mg tablet 9/24/2023  Yes Yes   Sig: Take 80 mg by mouth daily   calcium carbonate (OS-JERARDO) 600 MG tablet Unknown  Yes No   Sig: Take 600 mg by mouth daily   cephalexin (KEFLEX) 500 mg capsule Unknown  Yes No   Sig: Take 500 mg by mouth every 6 (six) hours   diltiazem (DILACOR XR) 180 MG 24 hr capsule 9/24/2023  Yes Yes   Sig: Take 180 mg by mouth daily   furosemide (LASIX) 40 mg tablet Unknown  Yes No   Sig: Take 40 mg by mouth daily   lactase (LACTAID) 3,000 units tablet Past Week  Yes Yes   Sig: Take 9,000 Units by mouth 3 (three) times a day as needed   lamoTRIgine (LaMICtal) 100 mg tablet 9/24/2023  Yes Yes   Sig: Take 100 mg by mouth 2 (two) times a day   levothyroxine 125 mcg tablet 9/24/2023  Yes Yes   Sig: Take 125 mcg by mouth daily   montelukast (SINGULAIR) 10 mg tablet Unknown  Yes No   Sig: Take 10 mg by mouth daily at bedtime   omeprazole (PriLOSEC) 20 mg delayed release capsule Unknown  Yes No   Sig: Take 20 mg by mouth daily   oxybutynin (DITROPAN) 5 mg tablet 9/24/2023  Yes Yes   Sig: Take 5 mg by mouth 3 (three) times a day   senna (SENOKOT) 8.6 MG tablet 9/23/2023  Yes Yes   Sig: Take 1 tablet by mouth daily as needed for constipation   sertraline (ZOLOFT) 100 mg tablet 9/24/2023  Yes Yes   Sig: Take 100 mg by mouth daily   warfarin (COUMADIN) 2.5 mg tablet 9/23/2023  Yes Yes   Sig: Take by mouth daily   warfarin (COUMADIN) 2.5 mg tablet 9/23/2023  Yes Yes   Sig: Take 1.25 mg by mouth 2 (two) times a week      Facility-Administered Medications: None       Scheduled Meds:  Current Facility-Administered Medications   Medication Dose Route Frequency Provider Last Rate   • acetaminophen  650 mg Oral Q6H PRN Mana Muro MD     • albuterol  2 puff Inhalation Q6H PRN Mana Muro MD     • ascorbic acid  500 mg Oral Daily JEAN Murray     • atorvastatin  80 mg Oral Daily JEAN Murray     • diltiazem  90 mg Oral Q12H [address] JEAN Murray     • docusate sodium  100 mg Oral BID Mana Muro MD     • enoxaparin  1 mg/kg Subcutaneous Q12H [address] Ila Amaral MD     • Fluticasone Furoate-Vilanterol  1 puff Inhalation Daily JEAN Cardona     • HYDROmorphone  0.2 mg Intravenous Q3H PRN Genesis Moody MD     • lactase  9,000 Units Oral TID PRN JEAN Cardona     • lamoTRIgine  100 mg Oral BID Genesis Moody MD     • levothyroxine  125 mcg Oral Early Morning JEAN Cardona     • oxybutynin  5 mg Oral BID JEAN Cardona     • oxyCODONE  5 mg Oral Q6H PRN Genesis Moody MD     • oxyCODONE  2.5 mg Oral Q6H PRN Genesis Moody MD     • pantoprazole  20 mg Oral Early Morning Genesis Moody MD     • sertraline  100 mg Oral Daily JEAN Cardona     • sodium chloride  2 g Oral TID With Meals Clary Rouse MD     • warfarin  2.5 mg Oral Daily (warfarin) Ila Amaral MD         PRN Meds:.•  acetaminophen  •  albuterol  •  HYDROmorphone  •  lactase  •  oxyCODONE  •  oxyCODONE    Continuous Infusions: Allergies   Allergen Reactions   • Erythromycin Photosensitivity   • Shellfish-Derived Products - Food Allergy Hives   • Ephedrine Rash   • Iodinated Contrast Media Rash   • Latex Rash   • Nsaids Rash   • Povidone Iodine Rash   • Salicylates Rash   • Shrimp (Diagnostic) - Food Allergy Rash   • Sympathomimetics Rash   • Tiotropium Rash   • Tositumomab Rash         Invasive Devices:      Invasive Devices     Peripheral Intravenous Line  Duration           Peripheral IV 23 Distal;Right;Ventral (anterior) Forearm <1 day          Drain  Duration           External Urinary Catheter 2 days                  PHYSICAL EXAM  BP 98/60   Pulse 87   Temp 98.4 °F (36.9 °C)   Resp 16   Ht 5' (1.524 m)   Wt 57.2 kg (126 lb)   SpO2 93%   BMI 24.61 kg/m²   Temp (24hrs), Av.6 °F (37 °C), Min:98.4 °F (36.9 °C), Max:98.8 °F (37.1 °C)        Intake/Output Summary (Last 24 hours) at 2023 1225  Last data filed at 2023 1100  Gross per 24 hour   Intake 1835 ml   Output 800 ml   Net 1035 ml       I/O last 24 hours: In: 0013 [P.O.:940; I.V.:1035; IV Piggyback:500]  Out: 800 [Urine:800]          Current Weight: Weight - Scale: 57.2 kg (126 lb)  First Weight: Weight - Scale: 57.2 kg (126 lb)  Physical Exam  Vitals and nursing note reviewed. Constitutional:       General: She is not in acute distress. Appearance: Normal appearance. She is normal weight. She is not ill-appearing, toxic-appearing or diaphoretic. HENT:      Head: Normocephalic and atraumatic. Mouth/Throat:      Pharynx: No oropharyngeal exudate. Eyes:      General: No scleral icterus. Conjunctiva/sclera: Conjunctivae normal.   Cardiovascular:      Rate and Rhythm: Normal rate. Pulmonary:      Effort: No respiratory distress. Breath sounds: No wheezing. Comments: mild basal crackles  Abdominal:      Palpations: Abdomen is soft. There is no mass. Tenderness: There is no abdominal tenderness. Musculoskeletal:         General: No swelling. Cervical back: Normal range of motion. No rigidity. Skin:     Coloration: Skin is not jaundiced. Neurological:      General: No focal deficit present. Mental Status: She is alert and oriented to person, place, and time.    Psychiatric:         Mood and Affect: Mood normal.         Behavior: Behavior normal.           LABORATORY:    Results from last 7 days   Lab Units 09/27/23  0526 09/26/23  2214 09/26/23  1517 09/26/23  1500 09/26/23  0907 09/25/23  0514 09/24/23  0720 09/23/23  1541 09/23/23  0613 09/22/23 2009 09/22/23  0225 09/22/23  0100   WBC Thousand/uL 9.99  --   --   --  12.41* 10.75* 12.28* 11.49* 11.22*  --   --  8.49   HEMOGLOBIN g/dL 8.7*  --   --   --  8.7* 9.7* 10.4* 9.7* 11.0* 10.4*   < > 13.1   HEMATOCRIT % 26.8*  --   --   --  26.0* 29.4* 31.7* 29.5* 33.6* 30.7*   < > 41.0   PLATELETS Thousands/uL 221  --   --   --  213 193 199 219 240  --   --  255   POTASSIUM mmol/L 4.0 4.0 3.7 3.7 4.0 4.7 4.2 3.5 3.9  --    < >  --    CHLORIDE mmol/L 95* 93* 93* 92* 91* 93* 94* 93* 94*  --    < >  --    CO2 mmol/L 28 26 24 24 27 29 31 30 35*  --    < >  --    BUN mg/dL 8 11 11 11 10 9 10 12 9  --    < >  --    CREATININE mg/dL 0.50* 0.61 0.64 0.70 0.58* 0.56* 0.59* 0.87 0.62  --    < >  --    CALCIUM mg/dL 7.9* 7.8* 8.3* 7.8* 8.1* 7.9* 8.2* 8.1* 8.7  --    < >  --    MAGNESIUM mg/dL 1.9  --   --   --   --   --  2.1 1.2*  --   --   --   --    PHOSPHORUS mg/dL  --   --   --   --   --   --   --  3.6  --   --   --   --     < > = values in this interval not displayed. rest all reviewed    RADIOLOGY:  No orders to display     Rest all reviewed    Portions of the record may have been created with voice recognition software. Occasional wrong word or "sound a like" substitutions may have occurred due to the inherent limitations of voice recognition software. Read the chart carefully and recognize, using context, where substitutions have occurred. If you have any questions, please contact the dictating provider.

## 2023-09-27 NOTE — CASE MANAGEMENT
Case Management Discharge Planning Note    Patient name Sonya Rodgers  Location [facility] 828/[facility] 632-38 MRN [id number]  : 1940 Date 2023       Current Admission Date: 2023  Current Admission Diagnosis:Traumatic hematoma of left shoulder   Patient Active Problem List    Diagnosis Date Noted   • Hyponatremia 2023   • At risk for delirium 2023   • Fall 2023   • Traumatic hematoma of left shoulder 2023   • S/P aortic valve replacement 2023   • Epilepsy ([address]) 2023      LOS (days): 5  Geometric Mean LOS (GMLOS) (days): 3.80  Days to GMLOS:-1.6     OBJECTIVE:  Risk of Unplanned Readmission Score: 19.18         Current admission status: Inpatient   Preferred Pharmacy:   [address]  [address] [facility] [address]  Phone: [phone] Fax: [phone]    Primary Care Provider: Jocelynn Kirk MD    Primary Insurance: [facility]  Secondary Insurance:     DISCHARGE DETAILS:    CM's messaged [employer] multiple times today for decision. [facility] states they're reviewing, though CM explained the pt's been medically stable for d/c for two days and the facility is the pt's first choice. Updated notes are available in order to submit for auth when given definitive answer.

## 2023-09-27 NOTE — PLAN OF CARE
Problem: PAIN - ADULT  Goal: Verbalizes/displays adequate comfort level or baseline comfort level  Description: Interventions:  - Encourage patient to monitor pain and request assistance  - Assess pain using appropriate pain scale  - Administer analgesics based on type and severity of pain and evaluate response  - Implement non-pharmacological measures as appropriate and evaluate response  - Consider cultural and social influences on pain and pain management  - Notify physician/advanced practitioner if interventions unsuccessful or patient reports new pain  Outcome: Progressing     Problem: INFECTION - ADULT  Goal: Absence or prevention of progression during hospitalization  Description: INTERVENTIONS:  - Assess and monitor for signs and symptoms of infection  - Monitor lab/diagnostic results  - Monitor all insertion sites, i.e. indwelling lines, tubes, and drains  - Monitor endotracheal if appropriate and nasal secretions for changes in amount and color  - [place] appropriate cooling/warming therapies per order  - Administer medications as ordered  - Instruct and encourage patient and family to use good hand hygiene technique  - Identify and instruct in appropriate isolation precautions for identified infection/condition  Outcome: Progressing  Goal: Absence of fever/infection during neutropenic period  Description: INTERVENTIONS:  - Monitor WBC    Outcome: Progressing

## 2023-09-27 NOTE — PLAN OF CARE
Problem: PHYSICAL THERAPY ADULT  Goal: Performs mobility at highest level of function for planned discharge setting. See evaluation for individualized goals. Description: Treatment/Interventions: Functional transfer training, LE strengthening/ROM, Therapeutic exercise, Endurance training, Patient/family training, Equipment eval/education, Bed mobility, Gait training, Spoke to nursing, OT  Equipment Recommended: Britt Zhong       See flowsheet documentation for full assessment, interventions and recommendations. Outcome: Progressing  Note: Prognosis: Good  Problem List: Decreased strength, Decreased endurance, Decreased mobility, Pain  Assessment: Pt seen for PT treatment session this date. Therapy session focused on functional transfers, and ambulation in order to improve overall mobility and independence. Pt participates in multiple STS transfers this session , completes with Min A X 1. Pt demonstrates improved activity tolerance and able to complete increased ambulation distance . Pt participates in hygiene care and toileting with assist, PT focused on functional transfers, standing balance and increased functional independence. Pt making steady progress toward goals. Pt was left in recliner at the end of PT session with all needs in reach. Pt would benefit from continued PT services while in hospital to address remaining limitations. The patient's AM-PAC Basic Mobility Inpatient Short Form Raw Score is 16. A Raw score of less than or equal to 16 suggests the patient may benefit from discharge to post-acute rehabilitation services. Please also refer to the recommendation of the Physical Therapist for safe discharge planning. Post dc recommendation is Rehab at this time. Barriers to Discharge: Decreased caregiver support     PT Discharge Recommendation: Post acute rehabilitation services    See flowsheet documentation for full assessment.

## 2023-09-27 NOTE — OCCUPATIONAL THERAPY NOTE
Occupational Therapy Progress Note     Patient Name: Lay Hendrickson  PQVCL'E Date: 9/27/2023  Problem List  Principal Problem:    Traumatic hematoma of left shoulder  Active Problems:    Fall    S/P aortic valve replacement    Epilepsy ([address])    At risk for delirium    Hyponatremia          09/27/23 1008   OT Last Visit   OT Visit Date 09/27/23   Note Type   Note Type Treatment for insurance authorization   Pain Assessment   Pain Assessment Tool 0-10   Pain Score No Pain   Restrictions/Precautions   Weight Bearing Precautions Per Order No   Other Precautions Bed Alarm; Chair Alarm;Multiple lines; Fall Risk;Pain;Hard of hearing   Lifestyle   Autonomy I adls and mobility with rollator -reports assist 2x/wk for showers - meals/homemaking provided   Reciprocal Relationships supportive family and facility staff   Service to Others retired   Intrinsic Gratification mostly sedentary   ADL   Where Assessed Chair   Eating Assistance 5  Supervision/Setup   Eating Deficit Beverage management   Grooming Assistance 5  Supervision/Setup   Grooming Deficit Setup; Wash/dry face; Wash/dry hands; Teeth care;Denture care   UB Bathing Assistance 4  Minimal Assistance   UB Bathing Deficit Setup   LB Bathing Assistance 2  Maximal Assistance   LB Bathing Deficit Setup;Right lower leg including foot; Left lower leg including foot   UB Dressing Assistance 4  Minimal Assistance   UB Dressing Deficit Setup; Fasteners   Toileting Assistance  2  Maximal Assistance   Toileting Deficit Setup   Bed Mobility   Supine to Sit 3  Moderate assistance   Additional items Assist x 1   Additional Comments Pt greeted supine in bed. Transfers   Sit to Stand 4  Minimal assistance   Additional items Assist x 1; Increased time required;Verbal cues   Stand to Sit 4  Minimal assistance   Additional items Assist x 1; Increased time required;Verbal cues   Functional Mobility   Functional Mobility 4  Minimal assistance   Additional Comments Min Ax1 with RW- short household distances with SBAX1 for chair follow   Additional items Rolling walker   Cognition   Overall Cognitive Status WFL   Arousal/Participation Alert; Responsive   Attention Attends with cues to redirect   Orientation Level Oriented X4   Memory Within functional limits   Following Commands Follows one step commands with increased time or repetition   Comments Pt pleasant and cooperative during OT session. Pt verbose and requires cues to attend back to task. Activity Tolerance   Activity Tolerance Patient limited by fatigue   Medical Staff Made Aware RN cleared/updated. Assessment   Assessment Pt greeted bedside for OT treatment on 9/27/2023 focusing on maximizing independence with ADLs. Pt requires mod AX1 with bed mobility, min A with functional transfers, and min Ax1 with RW. Pt engages in ADLs seated in recliner chair: S with grooming tasks (oral care, denture care, washing hands and face), min A with UB bathing, min A with UB dressing, and max A with LB dressing. Limitations that impact functional performance include decreased ADL status, decreased UE ROM, decreased UE strength, decreased safe judgement during ADLs, decreased cognition, decreased endurance, decreased self care transfers, decreased high level ADLs and pain. Occupational performance areas to address ADL retraining, functional transfer training, UE strengthening/ROM, endurance training, cognitive reorientation, Pt/caregiver education, equipment evaluation/education, compensatory technique education, energy conservation and activity engagement . Pt would benefit from continued skilled OT services while in hospital to maximize independence with ADLs. Will continue to follow Pt's goals and progress. Pt would benefit from post acute rehabilitation services upon DC to maximize safety and independence with ADLs and functional tasks of choice.    Plan   Treatment Interventions Functional transfer training;ADL retraining;UE strengthening/ROM; Endurance training;Cognitive reorientation;Patient/family training; Compensatory technique education; Activityengagement; Energy conservation   Goal Expiration Date 10/06/23   OT Treatment Day 1   OT Frequency 2-3x/wk   Recommendation   OT Discharge Recommendation Post acute rehabilitation services   Additional Comments  The patient's raw score on the AM-PAC Daily Activity Inpatient Short Form is 16. A raw score of less than 19 suggests the patient may benefit from discharge to post-acute rehabilitation services. Please refer to the recommendation of the Occupational Therapist for safe discharge planning. AM-PAC Daily Activity Inpatient   Lower Body Dressing 2   Bathing 2   Toileting 2   Upper Body Dressing 3   Grooming 3   Eating 4   Daily Activity Raw Score 16   Daily Activity Standardized Score (Calc for Raw Score >=11) 35.96   AM-PAC Applied Cognition Inpatient   Following a Speech/Presentation 4   Understanding Ordinary Conversation 4   Taking Medications 4   Remembering Where Things Are Placed or Put Away 3   Remembering List of 4-5 Errands 3   Taking Care of Complicated Tasks 3   Applied Cognition Raw Score 21   Applied Cognition Standardized Score 44.3   End of Consult   Education Provided Yes   Patient Position at End of Consult Bedside chair;Bed/Chair alarm activated; All needs within reach  (Hand off to PT)   Nurse Communication Nurse aware of consult       Bam Hernandez MS, OTR/L

## 2023-09-27 NOTE — ASSESSMENT & PLAN NOTE
· Patient noted to be intermittently confused on initial assessment by geriatrics. · Patient has remained alert and oriented. · Metabolic encephalopathy ruled out.

## 2023-09-27 NOTE — PROGRESS NOTES
[address] [place] [address]  Progress Note  Name: Sharon Sharpe  MRN: [id number]  [facility] Date of Admission: 9/22/2023   Date of Service: 9/27/2023 I Hospital Day: 5    Assessment/Plan   * Traumatic hematoma of left shoulder  Assessment & Plan  · With associated laceration status post bedside closure 9/22  · Compression wrap applied  · Trend hgb   · Monitor for signs infection    Fall  Assessment & Plan  · PT/OT eval, recommending rehab    Hyponatremia  Assessment & Plan  · Patient chronically hyponatremic. · Na 125 on 9/26. Na previously 130-133. · Serum osmolality mildly decreased. Urine osmolality and urine sodium WNL. · Patient started on NS at 100 mL/hour and salt tabs. · Nephrology consulted, recommendations appreciated. At risk for delirium  Assessment & Plan  · Patient noted to be intermittently confused on initial assessment by geriatrics. · Patient has remained alert and oriented. · Metabolic encephalopathy ruled out. Epilepsy ([address])  Assessment & Plan  · Continue home meds    S/P aortic valve replacement  Assessment & Plan  · On warfarin, held initially due to bleed  · Warfarin resumed on 9/23, bridge with therapeutic lovenox, goal INR 2.5           Bowel Regimen: Colace  VTE Prophylaxis:Enoxaparin (Lovenox)     Disposition: Rehab, pending placement    Subjective   Chief Complaint: Pain well controlled         Objective   Vitals:   Temp:  [98.4 °F (36.9 °C)-98.8 °F (37.1 °C)] 98.4 °F (36.9 °C)  HR:  [] 87  Resp:  [16-24] 16  BP: ()/(60-73) 98/60    I/O       09/25 0701  09/26 0700 09/26 0701  09/27 0700 09/27 0701  09/28 0700    P. O.  760     I.V. (mL/kg)  1035 (18.1)     IV Piggyback  500     Total Intake(mL/kg)  2295 (40.1)     Urine (mL/kg/hr) 350 (0.3)      Stool 0      Total Output 350      Net -350 +2295            Unmeasured Urine Occurrence  2 x     Unmeasured Stool Occurrence 1 x 2 x            Physical Exam:   GENERAL APPEARANCE: No acute distress  NEURO: Alert and oriented. HEENT: Normocephalic, atraumatic. Mucous membranes moist.  CV: Regular rate and rhythm. LUNGS: There to auscultation bilaterally. GI: Soft, nondistended, nontender. : Pelvis stable. MSK: Moving all 4 extremities equally. SKIN: Warm and dry. Ecchymosis of the left shoulder. Invasive Devices     Peripheral Intravenous Line  Duration           Peripheral IV 09/27/23 Distal;Right;Ventral (anterior) Forearm <1 day          Drain  Duration           External Urinary Catheter 2 days                      Lab Results:   BMP/CMP:   Lab Results   Component Value Date    SODIUM 128 (L) 09/27/2023    K 4.0 09/27/2023    CL 95 (L) 09/27/2023    CO2 28 09/27/2023    BUN 8 09/27/2023    CREATININE 0.50 (L) 09/27/2023    CALCIUM 7.9 (L) 09/27/2023    EGFR 89 09/27/2023     Imaging: I have personally reviewed pertinent reports.     Other Studies: None

## 2023-09-27 NOTE — PHYSICAL THERAPY NOTE
PHYSICAL THERAPY NOTE          Patient Name: Lexis López  VBWNW'H Date: 9/27/2023 09/27/23 0955   PT Last Visit   PT Visit Date 09/27/23   Note Type   Note Type Treatment   Education   Education Provided Yes   End of Consult   Patient Position at End of Consult Bedside chair; All needs within reach;Bed/Chair alarm activated   Pain Assessment   Pain Assessment Tool 0-10   Pain Location/Orientation Orientation: Left; Location: Shoulder   Effect of Pain on Daily Activities Limited mobility   Patient's Stated Pain Goal No pain   Hospital Pain Intervention(s) Repositioned; Ambulation/increased activity   Restrictions/Precautions   Weight Bearing Precautions Per Order No   Other Precautions Chair Alarm; Bed Alarm;Multiple lines; Fall Risk;Pain;Hard of hearing   General   Chart Reviewed Yes   Family/Caregiver Present No   Cognition   Overall Cognitive Status WFL   Arousal/Participation Alert; Responsive   Attention Attends with cues to redirect   Orientation Level Oriented X4   Following Commands Follows one step commands with increased time or repetition   Comments Pt cooperative during sessioon   Subjective   Subjective Agreeable to mobilize   Bed Mobility   Supine to Sit Unable to assess   Sit to Supine Unable to assess   Additional Comments Pt noted to be in chair upon arrival   Transfers   Sit to Stand 4  Minimal assistance   Additional items Assist x 1; Increased time required;Verbal cues   Stand to Sit 4  Minimal assistance   Additional items Assist x 1; Increased time required;Verbal cues   Toilet transfer 4  Minimal assistance   Additional items Assist x 1; Increased time required;Verbal cues;Standard toilet   Additional Comments Pt completes 4 STS transfers during session with Min A X1 , uses RW for support   Ambulation/Elevation   Gait pattern Foward flexed; Short stride; Excessively slow   Gait Assistance 4  Minimal assist Additional items Assist x 1;Verbal cues; Tactile cues   Assistive Device Rolling walker   Distance 35 ft + 25 ft + 5 FT   Ambulation/Elevation Additional Comments Pt demonstrates improvement in ambulation distance this session. Completes task with 1 seated rest break. Chair follow for safety   Balance   Static Sitting Fair +   Dynamic Sitting Fair   Static Standing Fair -   Dynamic Standing Poor +   Ambulatory Poor   Endurance Deficit   Endurance Deficit Yes   Endurance Deficit Description fatigue, pain   Activity Tolerance   Activity Tolerance Patient limited by pain; Patient limited by fatigue   Medical Staff Made Aware Co-treat for part of session with OT [address]    Nurse Made Aware RN cleeared pt for therapy   Exercises   Balance training  STS transfers X 4, Sitting baalnce with ADL task   Assessment   Prognosis Good   Problem List Decreased strength;Decreased endurance;Decreased mobility;Pain   Assessment Pt seen for PT treatment session this date. Therapy session focused on functional transfers, and ambulation in order to improve overall mobility and independence. Pt participates in multiple STS transfers this session , completes with Min A X 1. Pt demonstrates improved activity tolerance and able to complete increased ambulation distance . Pt participates in hygiene care and toileting with assist, PT focused on functional transfers, standing balance and increased functional independence. Pt making steady progress toward goals. Pt was left in recliner at the end of PT session with all needs in reach. Pt would benefit from continued PT services while in hospital to address remaining limitations. The patient's AM-PAC Basic Mobility Inpatient Short Form Raw Score is 16. A Raw score of less than or equal to 16 suggests the patient may benefit from discharge to post-acute rehabilitation services. Please also refer to the recommendation of the Physical Therapist for safe discharge planning.  Post dc recommendation is Rehab at this time. Barriers to Discharge Decreased caregiver support   Goals   Patient Goals to walk   STG Expiration Date 10/02/23   PT Treatment Day 2   Plan   Treatment/Interventions Functional transfer training; Therapeutic exercise;Patient/family training;Gait training;Bed mobility;Spoke to nursing;OT   Progress Progressing toward goals   PT Frequency 2-3x/wk   Recommendation   PT Discharge Recommendation Post acute rehabilitation services   Equipment Recommended Walker   AM-PAC Basic Mobility Inpatient   Turning in Flat Bed Without Bedrails 3   Lying on Back to Sitting on Edge of Flat Bed Without Bedrails 3   Moving Bed to Chair 3   Standing Up From Chair Using Arms 3   Walk in Room 3   Climb 3-5 Stairs With Railing 1   Basic Mobility Inpatient Raw Score 16   Basic Mobility Standardized Score 38.32   Highest Level Of Mobility   JH-HLM Goal 5: Stand one or more mins   JH-HLM Achieved 7: Walk 25 feet or more   Education   Education Provided Mobility training   Patient Demonstrates verbal understanding   End of Consult   Patient Position at End of Consult All needs within reach;Bed/Chair alarm activated; Bedside chair   Miroslava Manjarrez PT DPT

## 2023-09-27 NOTE — PLAN OF CARE
Problem: OCCUPATIONAL THERAPY ADULT  Goal: Performs self-care activities at highest level of function for planned discharge setting. See evaluation for individualized goals. Description: Treatment Interventions: ADL retraining, Functional transfer training, UE strengthening/ROM, Endurance training, Patient/family training, Equipment evaluation/education, Compensatory technique education, Activityengagement          See flowsheet documentation for full assessment, interventions and recommendations. Outcome: Progressing  Note: Limitation: Decreased ADL status, Decreased UE ROM, Decreased endurance, Decreased self-care trans, Decreased high-level ADLs  Prognosis: Good  Assessment: Pt greeted bedside for OT treatment on 9/27/2023 focusing on maximizing independence with ADLs. Pt requires mod AX1 with bed mobility, min A with functional transfers, and min Ax1 with RW. Pt engages in ADLs seated in recliner chair: S with grooming tasks (oral care, denture care, washing hands and face), min A with UB bathing, min A with UB dressing, and max A with LB dressing. Limitations that impact functional performance include decreased ADL status, decreased UE ROM, decreased UE strength, decreased safe judgement during ADLs, decreased cognition, decreased endurance, decreased self care transfers, decreased high level ADLs and pain. Occupational performance areas to address ADL retraining, functional transfer training, UE strengthening/ROM, endurance training, cognitive reorientation, Pt/caregiver education, equipment evaluation/education, compensatory technique education, energy conservation and activity engagement . Pt would benefit from continued skilled OT services while in hospital to maximize independence with ADLs. Will continue to follow Pt's goals and progress. Pt would benefit from post acute rehabilitation services upon DC to maximize safety and independence with ADLs and functional tasks of choice.      OT Discharge Recommendation: Post acute rehabilitation services

## 2023-09-27 NOTE — ASSESSMENT & PLAN NOTE
· PT/OT eval, recommending rehab  · CM for placement, currently awaiting insurance approval. Anticipated DC tomorrow

## 2023-09-27 NOTE — ASSESSMENT & PLAN NOTE
· Patient chronically hyponatremic. · Na 125 on 9/26. Na previously 130-133. · Serum osmolality mildly decreased. Urine osmolality and urine sodium WNL. · Patient started on NS at 100 mL/hour and salt tabs. · Nephrology consulted, recommendations appreciated.

## 2023-09-27 NOTE — PLAN OF CARE
Problem: PAIN - ADULT  Goal: Verbalizes/displays adequate comfort level or baseline comfort level  Description: Interventions:  - Encourage patient to monitor pain and request assistance  - Assess pain using appropriate pain scale  - Administer analgesics based on type and severity of pain and evaluate response  - Implement non-pharmacological measures as appropriate and evaluate response  - Consider cultural and social influences on pain and pain management  - Notify physician/advanced practitioner if interventions unsuccessful or patient reports new pain  Outcome: Progressing     Problem: SAFETY ADULT  Goal: Patient will remain free of falls  Description: INTERVENTIONS:  - Educate patient/family on patient safety including physical limitations  - Instruct patient to call for assistance with activity   - Consult OT/PT to assist with strengthening/mobility   - Keep Call bell within reach  - Keep bed low and locked with side rails adjusted as appropriate  - Keep care items and personal belongings within reach  - Initiate and maintain comfort rounds  - Make Fall Risk Sign visible to staff  - Offer Toileting every 2 Hours, in advance of need  - Initiate/Maintain alarm  - Obtain necessary fall risk management equipment  - Apply yellow socks and bracelet for high fall risk patients  - Consider moving patient to room near nurses station  Outcome: Progressing  Goal: Maintain or return to baseline ADL function  Description: INTERVENTIONS:  -  Assess patient's ability to carry out ADLs; assess patient's baseline for ADL function and identify physical deficits which impact ability to perform ADLs (bathing, care of mouth/teeth, toileting, grooming, dressing, etc.)  - Assess/evaluate cause of self-care deficits   - Assess range of motion  - Assess patient's mobility; develop plan if impaired  - Assess patient's need for assistive devices and provide as appropriate  - Encourage maximum independence but intervene and supervise when necessary  - Involve family in performance of ADLs  - Assess for home care needs following discharge   - Consider OT consult to assist with ADL evaluation and planning for discharge  - Provide patient education as appropriate  Outcome: Progressing  Goal: Maintains/Returns to pre admission functional level  Description: INTERVENTIONS:  - Perform BMAT or MOVE assessment daily.   - Set and communicate daily mobility goal to care team and patient/family/caregiver. - Collaborate with rehabilitation services on mobility goals if consulted   - Out of bed for toileting  - Record patient progress and toleration of activity level   Outcome: Progressing     Problem: Knowledge Deficit  Goal: Patient/family/caregiver demonstrates understanding of disease process, treatment plan, medications, and discharge instructions  Description: Complete learning assessment and assess knowledge base.   Interventions:  - Provide teaching at level of understanding  - Provide teaching via preferred learning methods  Outcome: Progressing

## 2023-09-28 LAB
ANION GAP SERPL CALCULATED.3IONS-SCNC: 7 MMOL/L
BUN SERPL-MCNC: 7 MG/DL (ref 5–25)
CALCIUM SERPL-MCNC: 7.7 MG/DL (ref 8.4–10.2)
CHLORIDE SERPL-SCNC: 101 MMOL/L (ref 96–108)
CO2 SERPL-SCNC: 26 MMOL/L (ref 21–32)
CREAT SERPL-MCNC: 0.58 MG/DL (ref 0.6–1.3)
GFR SERPL CREATININE-BSD FRML MDRD: 85 ML/MIN/1.73SQ M
GLUCOSE SERPL-MCNC: 136 MG/DL (ref 65–140)
INR PPP: 2 (ref 0.84–1.19)
MAGNESIUM SERPL-MCNC: 1.8 MG/DL (ref 1.9–2.7)
PHOSPHATE SERPL-MCNC: 2.2 MG/DL (ref 2.3–4.1)
POTASSIUM SERPL-SCNC: 4 MMOL/L (ref 3.5–5.3)
PROTHROMBIN TIME: 22.9 SECONDS (ref 11.6–14.5)
SODIUM SERPL-SCNC: 134 MMOL/L (ref 135–147)

## 2023-09-28 PROCEDURE — 80048 BASIC METABOLIC PNL TOTAL CA: CPT | Performed by: STUDENT IN AN ORGANIZED HEALTH CARE EDUCATION/TRAINING PROGRAM

## 2023-09-28 PROCEDURE — 83735 ASSAY OF MAGNESIUM: CPT | Performed by: INTERNAL MEDICINE

## 2023-09-28 PROCEDURE — 99232 SBSQ HOSP IP/OBS MODERATE 35: CPT | Performed by: STUDENT IN AN ORGANIZED HEALTH CARE EDUCATION/TRAINING PROGRAM

## 2023-09-28 PROCEDURE — 85610 PROTHROMBIN TIME: CPT | Performed by: STUDENT IN AN ORGANIZED HEALTH CARE EDUCATION/TRAINING PROGRAM

## 2023-09-28 PROCEDURE — 84100 ASSAY OF PHOSPHORUS: CPT | Performed by: INTERNAL MEDICINE

## 2023-09-28 PROCEDURE — 97535 SELF CARE MNGMENT TRAINING: CPT

## 2023-09-28 PROCEDURE — 99232 SBSQ HOSP IP/OBS MODERATE 35: CPT | Performed by: INTERNAL MEDICINE

## 2023-09-28 PROCEDURE — 97530 THERAPEUTIC ACTIVITIES: CPT

## 2023-09-28 RX ADMIN — LAMOTRIGINE 100 MG: 100 TABLET ORAL at 09:31

## 2023-09-28 RX ADMIN — OXYCODONE HYDROCHLORIDE AND ACETAMINOPHEN 500 MG: 500 TABLET ORAL at 09:31

## 2023-09-28 RX ADMIN — LEVOTHYROXINE SODIUM 125 MCG: 125 TABLET ORAL at 06:36

## 2023-09-28 RX ADMIN — SODIUM CHLORIDE 2 G: 1 TABLET ORAL at 13:28

## 2023-09-28 RX ADMIN — LAMOTRIGINE 100 MG: 100 TABLET ORAL at 21:04

## 2023-09-28 RX ADMIN — ATORVASTATIN CALCIUM 80 MG: 80 TABLET, FILM COATED ORAL at 09:31

## 2023-09-28 RX ADMIN — SERTRALINE 100 MG: 100 TABLET, FILM COATED ORAL at 09:31

## 2023-09-28 RX ADMIN — DOCUSATE SODIUM 100 MG: 100 CAPSULE, LIQUID FILLED ORAL at 17:25

## 2023-09-28 RX ADMIN — PANTOPRAZOLE SODIUM 20 MG: 20 TABLET, DELAYED RELEASE ORAL at 06:36

## 2023-09-28 RX ADMIN — DIBASIC SODIUM PHOSPHATE, MONOBASIC POTASSIUM PHOSPHATE AND MONOBASIC SODIUM PHOSPHATE 2 TABLET: 852; 155; 130 TABLET ORAL at 13:28

## 2023-09-28 RX ADMIN — ENOXAPARIN SODIUM 60 MG: 60 INJECTION SUBCUTANEOUS at 09:32

## 2023-09-28 RX ADMIN — OXYBUTYNIN CHLORIDE 5 MG: 5 TABLET ORAL at 09:31

## 2023-09-28 RX ADMIN — DILTIAZEM HYDROCHLORIDE 90 MG: 90 CAPSULE, EXTENDED RELEASE ORAL at 21:04

## 2023-09-28 RX ADMIN — WARFARIN SODIUM 2.5 MG: 2.5 TABLET ORAL at 17:25

## 2023-09-28 RX ADMIN — OXYBUTYNIN CHLORIDE 5 MG: 5 TABLET ORAL at 17:25

## 2023-09-28 RX ADMIN — ENOXAPARIN SODIUM 60 MG: 60 INJECTION SUBCUTANEOUS at 21:04

## 2023-09-28 RX ADMIN — SODIUM CHLORIDE 2 G: 1 TABLET ORAL at 06:36

## 2023-09-28 RX ADMIN — DILTIAZEM HYDROCHLORIDE 90 MG: 90 CAPSULE, EXTENDED RELEASE ORAL at 09:30

## 2023-09-28 RX ADMIN — FLUTICASONE FUROATE AND VILANTEROL TRIFENATATE 1 PUFF: 100; 25 POWDER RESPIRATORY (INHALATION) at 11:21

## 2023-09-28 RX ADMIN — SODIUM CHLORIDE 2 G: 1 TABLET ORAL at 17:25

## 2023-09-28 NOTE — PLAN OF CARE
Problem: OCCUPATIONAL THERAPY ADULT  Goal: Performs self-care activities at highest level of function for planned discharge setting. See evaluation for individualized goals. Description: Treatment Interventions: ADL retraining, Functional transfer training, UE strengthening/ROM, Endurance training, Patient/family training, Equipment evaluation/education, Compensatory technique education, Activityengagement          See flowsheet documentation for full assessment, interventions and recommendations. Outcome: Progressing  Note: Limitation: Decreased ADL status, Decreased UE ROM, Decreased endurance, Decreased self-care trans, Decreased high-level ADLs  Prognosis: Good  Assessment: Pt greeted bedside for OT treatment on 9/28/2023 focusing on maximizing independence with ADLs. Pt completed bed mobility with mod A and requires increased time to sit on EOB to orient. Pt requires mod AX1 with functional transfers and Toan X1 with functional mobility with RW. Pt completes beverage management at S level, LB dressing at max A and LB bathing at max A level. Limitations that impact functional performance include decreased ADL status, decreased UE ROM, decreased UE strength, decreased safe judgement during ADLs, decreased cognition, decreased endurance, decreased self care transfers, decreased high level ADLs and pain. Occupational performance areas to address ADL retraining, functional transfer training, UE strengthening/ROM, endurance training, cognitive reorientation, Pt/caregiver education, equipment evaluation/education, compensatory technique education, energy conservation and activity engagement . Pt would benefit from continued skilled OT services while in hospital to maximize independence with ADLs. Will continue to follow Pt's goals and progress. Pt would benefit from post acute rehabilitation services upon DC to maximize safety and independence with ADLs and functional tasks of choice.      OT Discharge Recommendation: Post acute rehabilitation services

## 2023-09-28 NOTE — CASE MANAGEMENT
Case Management Discharge Planning Note    Patient name Josey Cooper  Location [facility] 828/[facility] 082-26 MRN [id number]  : 1940 Date 2023       Current Admission Date: 2023  Current Admission Diagnosis:Traumatic hematoma of left shoulder   Patient Active Problem List    Diagnosis Date Noted   • Hyponatremia 2023   • At risk for delirium 2023   • Fall 2023   • Traumatic hematoma of left shoulder 2023   • S/P aortic valve replacement 2023   • Epilepsy ([address]) 2023      LOS (days): 6  Geometric Mean LOS (GMLOS) (days): 3.80  Days to GMLOS:-2.7     OBJECTIVE:  Risk of Unplanned Readmission Score: 21.04         Current admission status: Inpatient   Preferred Pharmacy:   [address] - [address] [place] [address] Road [facility]  Phone: [phone] Fax: [phone]    Primary Care Provider: Norma Rivas MD    Primary Insurance: Antionette SINGH [facility]  Secondary Insurance:     DISCHARGE DETAILS:    D/C tomorrow @3907 via Erica Chavez

## 2023-09-28 NOTE — PROGRESS NOTES
[facility] [facility]  Progress Note  Name: Soumya Little  MRN: [id number]  Unit/Bed#: [facility] 390-09 I Date of Admission: 9/22/2023   Date of Service: 9/28/2023 I Hospital Day: 6    Assessment/Plan   Hyponatremia  Assessment & Plan  · Patient chronically hyponatremic. · Na 125 on 9/26. Na previously 130-133. · Serum osmolality mildly decreased. Urine osmolality and urine sodium WNL. · Patient started on NS at 100 mL/hour and salt tabs. · Nephrology consulted, recommendations appreciated. At risk for delirium  Assessment & Plan  · Patient noted to be intermittently confused on initial assessment by geriatrics. · Patient has remained alert and oriented. · Metabolic encephalopathy ruled out. Epilepsy ([address])  Assessment & Plan  · Continue home meds    S/P aortic valve replacement  Assessment & Plan  · On warfarin, held initially due to bleed  · Warfarin resumed on 9/23, bridge with therapeutic lovenox, goal INR 2.5    Fall  Assessment & Plan  · PT/OT eval, recommending rehab  · CM for placement, currently awaiting insurance approval. Anticipated DC tomorrow    * Traumatic hematoma of left shoulder  Assessment & Plan  · With associated laceration status post bedside closure 9/22  · Compression wrap applied  · Trend hgb   · Monitor for signs infection             Subjective/Objective     Subjective:   Patient seen and examined at bedside this morning. Pain well controlled. Denies fever/chills, denies nausea vomiting or diarrhea. Denies chest pain or shortness of breath. Patient not having any difficulty with urinating, passing flatus. Patient currently awaiting insurance authorization for placement. Patient is medically stable for discharge. Objective:  Vitals: Blood pressure 114/76, pulse 101, temperature 98.9 °F (37.2 °C), resp. rate 16, height 5' (1.524 m), weight 57.2 kg (126 lb), SpO2 94 %. ,Body mass index is 24.61 kg/m².       Intake/Output Summary (Last 24 hours) at 9/28/2023 1306  Last data filed at 9/28/2023 0301  Gross per 24 hour   Intake 1000 ml   Output 950 ml   Net 50 ml       Invasive Devices     Peripheral Intravenous Line  Duration           Peripheral IV 09/27/23 Distal;Right;Ventral (anterior) Forearm 1 day          Drain  Duration           External Urinary Catheter 3 days                Physical Exam: /76   Pulse 101   Temp 98.9 °F (37.2 °C)   Resp 16   Ht 5' (1.524 m)   Wt 57.2 kg (126 lb)   SpO2 94%   BMI 24.61 kg/m²     GENERAL APPEARANCE: NAD  NEURO: GCS 15 no focal deficits  HEENT: NCAT  CV: well perfused peripherally  LUNGS: no increased WOB, no stridor  GI: no abdominal distension  MSK: no TTP of extremities, full ROM  SKIN: warm dry intact      Lab, Imaging and other studies: I have personally reviewed pertinent reports.     VTE Pharmacologic Prophylaxis: Warfarin (Coumadin)  VTE Mechanical Prophylaxis: sequential compression device

## 2023-09-28 NOTE — OCCUPATIONAL THERAPY NOTE
Occupational Therapy Progress Note     Patient Name: Lexis López  YILWX'G Date: 9/28/2023  Problem List  Principal Problem:    Traumatic hematoma of left shoulder  Active Problems:    Fall    S/P aortic valve replacement    Epilepsy ([address])    At risk for delirium    Hyponatremia            09/28/23 1128   OT Last Visit   OT Visit Date 09/28/23   Note Type   Note Type Treatment   Pain Assessment   Pain Assessment Tool 0-10   Pain Score No Pain   Restrictions/Precautions   Weight Bearing Precautions Per Order No   Other Precautions Chair Alarm; Bed Alarm;Pain; Fall Risk;Hard of hearing   Lifestyle   Autonomy I adls and mobility with rollator -reports assist 2x/wk for showers - meals/homemaking provided   Reciprocal Relationships supportive family and facility staff   Service to Others retired   Intrinsic Gratification mostly sedentary   ADL   Where Assessed Edge of bed   Equipment Provided Long-handled shoe horn   Eating Assistance 5  Supervision/Setup   Eating Deficit Beverage management   LB Bathing Assistance 2  Maximal Assistance   LB Bathing Deficit Right lower leg including foot; Left lower leg including foot; Other (Comment)  (apply lotion)   LB Dressing Assistance 2  Maximal Assistance   LB Dressing Deficit Setup;Don/doff R sock; Don/doff L sock; Don/doff L shoe;Don/doff R shoe;Use of adaptive equipment   Bed Mobility   Supine to Sit 3  Moderate assistance   Additional items Assist x 1; Increased time required;Verbal cues;LE management   Additional Comments Pt greeted supine in bed. Transfers   Sit to Stand 3  Moderate assistance   Additional items Assist x 1; Increased time required;Verbal cues   Stand to Sit 4  Minimal assistance   Additional items Assist x 1;Verbal cues; Increased time required   Functional Mobility   Functional Mobility 4  Minimal assistance   Additional Comments Min AX1 with RW - few steps from EOB to recliner chair.    Additional items Rolling walker   Subjective   Subjective "I feel forgetful today."   Cognition   Overall Cognitive Status WFL   Arousal/Participation Alert; Responsive   Attention Attends with cues to redirect   Orientation Level Oriented X4   Memory Within functional limits   Following Commands Follows one step commands with increased time or repetition   Comments Pt lethargic and requires increased time to awaken while sitting on EOB. Pt pleasant and cooperative during OT session. Pt with occasional confusion and requires cues to reorient. Activity Tolerance   Activity Tolerance Patient limited by fatigue   Medical Staff Made Aware RN cleared/updated. Assessment   Assessment Pt greeted bedside for OT treatment on 9/28/2023 focusing on maximizing independence with ADLs. Pt completed bed mobility with mod A and requires increased time to sit on EOB to orient. Pt requires mod AX1 with functional transfers and Toan X1 with functional mobility with RW. Pt completes beverage management at S level, LB dressing at max A and LB bathing at max A level. Limitations that impact functional performance include decreased ADL status, decreased UE ROM, decreased UE strength, decreased safe judgement during ADLs, decreased cognition, decreased endurance, decreased self care transfers, decreased high level ADLs and pain. Occupational performance areas to address ADL retraining, functional transfer training, UE strengthening/ROM, endurance training, cognitive reorientation, Pt/caregiver education, equipment evaluation/education, compensatory technique education, energy conservation and activity engagement . Pt would benefit from continued skilled OT services while in hospital to maximize independence with ADLs. Will continue to follow Pt's goals and progress. Pt would benefit from post acute rehabilitation services upon DC to maximize safety and independence with ADLs and functional tasks of choice.    Plan   Treatment Interventions ADL retraining;Functional transfer training;UE strengthening/ROM; Cognitive reorientation; Endurance training;Patient/family training; Compensatory technique education; Activityengagement; Energy conservation   Goal Expiration Date 10/06/23   OT Treatment Day 2   OT Frequency 2-3x/wk   Recommendation   OT Discharge Recommendation Post acute rehabilitation services   Additional Comments  The patient's raw score on the AM-PAC Daily Activity Inpatient Short Form is 16. A raw score of less than 19 suggests the patient may benefit from discharge to post-acute rehabilitation services. Please refer to the recommendation of the Occupational Therapist for safe discharge planning. AM-PAC Daily Activity Inpatient   Lower Body Dressing 2   Bathing 2   Toileting 2   Upper Body Dressing 3   Grooming 3   Eating 4   Daily Activity Raw Score 16   Daily Activity Standardized Score (Calc for Raw Score >=11) 35.96   AM-PAC Applied Cognition Inpatient   Following a Speech/Presentation 4   Understanding Ordinary Conversation 4   Taking Medications 4   Remembering Where Things Are Placed or Put Away 3   Remembering List of 4-5 Errands 3   Taking Care of Complicated Tasks 3   Applied Cognition Raw Score 21   Applied Cognition Standardized Score 44.3   End of Consult   Education Provided Yes   Patient Position at End of Consult Bedside chair;Bed/Chair alarm activated; All needs within reach   Nurse Communication Nurse aware of consult       Lisha Washburn MS, OTR/L

## 2023-09-28 NOTE — PROGRESS NOTES
[address] NOTE   [facility] 80 y.o. female MRN: [id number]  Unit/Bed#: [facility] 828-01 Encounter: [id number]  Reason for Consult: hyponatremia    ASSESSMENT and PLAN:    80-year-old female with a past medical history of hypertension, GERD, anxiety, mechanical aortic valve, COPD, A-fib, HPL, SAM, seizure who initially presents with trauma and status post fall with shoulder laceration. Nephrology on board for hyponatremia    1-hyponatremia- hypoosmolar    - Patient sodium low 130s  - Admission sodium 135 on 9/22  - Sodium level decreasing to 128 on 9/27 prompting nephrology consultation  - Etiology- possibly iatrogenic with antidepressant, hypovolemia, increased ADH in setting of pain, also with pulmonary nodules  - Serum osmolarity 261  - Urine osmolarity 352  - Urine sodium-19  - Intravenous fluids held  - Started on salt tablets. Sodium level improving to 130 on 9/27. Then in the evening was 129. Appropriate. Plan  - I/os; avoid nephrotoxic agents  - Avoid hypotension  - Awaiting BMP this morning  - BMP again in a.m.  - Continue salt tablet for now  - Recheck phosphorus level in a.m.  - If phosphorus less than 2, please replete  - I have messaged primary team to review-awaiting BMP and we are in agreement to await BMP before further decisions on disposition. Is on sodium levels improving, no changes to salt tablets. If sodium levels decreasing, may need to increase salt tablets. Attempting to avoid diuretic for now given marginal blood pressures. 2-renal function- had contrast exposure 9/22. Creatinine stable 0.5 mg/dL. 3-acid/base-bicarbonate stable and appropriate yesterday    4-anemia-per primary team    5- hypertension-on diltiazem. At home is on Lasix. Currently being held. 6-mechanical aortic valve-anticoagulation per primary team    7-trauma-fall. Status post intramuscular hemorrhage and laceration. Per primary team.  Laceration repair 9/22.   Hematoma reversal with Kcentra. SUBJECTIVE / 24H INTERVAL HISTORY:    Blood pressures 53-1 14 systolic. Afebrile. On room air.     OBJECTIVE:  Current Weight: Weight - Scale: 57.2 kg (126 lb)  Vitals:    09/27/23 1517 09/27/23 2050 09/27/23 2150 09/28/23 0642   BP: 99/61 115/77 114/76 114/76   Pulse: 92 99 100 101   Resp: 16  18 16   Temp: 98.1 °F (36.7 °C)  98.4 °F (36.9 °C) 98.9 °F (37.2 °C)   TempSrc:       SpO2: 95% 96% 94% 94%   Weight:       Height:           Intake/Output Summary (Last 24 hours) at 9/28/2023 1026  Last data filed at 9/28/2023 0301  Gross per 24 hour   Intake 1000 ml   Output 1750 ml   Net -750 ml     General: NAD  Skin: no rash  Eyes: anicteric sclera  ENT: moist mucous membrane  Neck: supple  Chest: CTA b/l, no ronchii, no wheeze, no rubs, no rales  CVS: s1s2, no murmur, no gallop, no rub  Abdomen: soft, nontender, nl sounds  Extremities: no edema LE b/l, left shoulder laceration repair noted, mild erythema significant pitting  : no wang  Neuro: AAOX3  Psych: normal affect    Medications:    Current Facility-Administered Medications:   •  acetaminophen (TYLENOL) tablet 650 mg, 650 mg, Oral, Q6H PRN, Mia Ureña MD, 650 mg at 09/26/23 2216  •  albuterol (PROVENTIL HFA,VENTOLIN HFA) inhaler 2 puff, 2 puff, Inhalation, Q6H PRN, Mia Ureña MD  •  ascorbic acid (VITAMIN C) tablet 500 mg, 500 mg, Oral, Daily, Ban Beavers, CRNP, 500 mg at 09/28/23 2373  •  atorvastatin (LIPITOR) tablet 80 mg, 80 mg, Oral, Daily, Ban Beavers, CRNP, 80 mg at 09/28/23 0931  •  diltiazem (CARDIZEM SR) 12 hr capsule 90 mg, 90 mg, Oral, Q12H [facility], Ban Beavers, CRNP, 90 mg at 09/28/23 0930  •  docusate sodium (COLACE) capsule 100 mg, 100 mg, Oral, BID, Mia Ureña MD, 100 mg at 09/27/23 1715  •  enoxaparin (LOVENOX) subcutaneous injection 60 mg, 1 mg/kg, Subcutaneous, Q12H [facility], Yesy Butts MD, 60 mg at 09/28/23 0932  •  Fluticasone Furoate-Vilanterol 100-25 mcg/actuation 1 puff, 1 puff, Inhalation, Daily, JEAN Lewis, 1 puff at 09/27/23 2805  •  HYDROmorphone HCl (DILAUDID) injection 0.2 mg, 0.2 mg, Intravenous, Q3H PRN, Andi Law MD  •  lactase (LACTAID) tablet 9,000 Units, 9,000 Units, Oral, TID PRN, JEAN Lewis  •  lamoTRIgine (LaMICtal) tablet 100 mg, 100 mg, Oral, BID, Andi Law MD, 100 mg at 09/28/23 6365  •  levothyroxine tablet 125 mcg, 125 mcg, Oral, Early Morning, JEAN Lewis, 125 mcg at 09/28/23 7942  •  oxybutynin (DITROPAN) tablet 5 mg, 5 mg, Oral, BID, JEAN Lewis, 5 mg at 09/28/23 4009  •  oxyCODONE (ROXICODONE) IR tablet 5 mg, 5 mg, Oral, Q6H PRN, Andi Law MD, 5 mg at 09/27/23 6496  •  oxyCODONE (ROXICODONE) split tablet 2.5 mg, 2.5 mg, Oral, Q6H PRN, Andi Law MD  •  pantoprazole (PROTONIX) EC tablet 20 mg, 20 mg, Oral, Early Morning, Andi Law MD, 20 mg at 09/28/23 7022  •  sertraline (ZOLOFT) tablet 100 mg, 100 mg, Oral, Daily, JEAN Lewis, 100 mg at 09/28/23 2972  •  sodium chloride tablet 2 g, 2 g, Oral, TID With Meals, Brionna Leary MD, 2 g at 09/28/23 1941  •  warfarin (COUMADIN) tablet 2.5 mg, 2.5 mg, Oral, Daily (warfarin), Karen Galicia MD, 2.5 mg at 09/27/23 1714    Laboratory Results:  Results from last 7 days   Lab Units 09/28/23  0647 09/27/23  2020 09/27/23  1344 09/27/23  0526 09/26/23  2214 09/26/23  1517 09/26/23  1500 09/26/23  0907 09/25/23  0514 09/24/23  0720 09/23/23  1541 09/23/23  0613 09/22/23 2009 09/22/23  0225 09/22/23  0100   WBC Thousand/uL  --   --   --  9.99  --   --   --  12.41* 10.75* 12.28* 11.49* 11.22*  --   --  8.49   HEMOGLOBIN g/dL  --   --   --  8.7*  --   --   --  8.7* 9.7* 10.4* 9.7* 11.0* 10.4*   < > 13.1   HEMATOCRIT %  --   --   --  26.8*  --   --   --  26.0* 29.4* 31.7* 29.5* 33.6* 30.7*   < > 41.0   PLATELETS Thousands/uL  --   --   --  221  --   --   --  213 193 199 219 240  --   --  255   POTASSIUM mmol/L  --  3.9 3.5 4.0 4.0 3.7 3.7 4.0 4.7 4.2 3.5 3.9  --    < >  --    CHLORIDE mmol/L  --  98 99 95* 93* 93* 92* 91* 93* 94* 93* 94*  --    < >  --    CO2 mmol/L  --  27 25 28 26 24 24 27 29 31 30 35*  --    < >  --    BUN mg/dL  --  8 9 8 11 11 11 10 9 10 12 9  --    < >  --    CREATININE mg/dL  --  0.53* 0.53* 0.50* 0.61 0.64 0.70 0.58* 0.56* 0.59* 0.87 0.62  --    < >  --    CALCIUM mg/dL  --  7.7* 7.7* 7.9* 7.8* 8.3* 7.8* 8.1* 7.9* 8.2* 8.1* 8.7  --    < >  --    MAGNESIUM mg/dL 1.8*  --   --  1.9  --   --   --   --   --  2.1 1.2*  --   --   --   --    PHOSPHORUS mg/dL 2.2*  --   --   --   --   --   --   --   --   --  3.6  --   --   --   --     < > = values in this interval not displayed.

## 2023-09-28 NOTE — PLAN OF CARE
Problem: PAIN - ADULT  Goal: Verbalizes/displays adequate comfort level or baseline comfort level  Description: Interventions:  - Encourage patient to monitor pain and request assistance  - Assess pain using appropriate pain scale  - Administer analgesics based on type and severity of pain and evaluate response  - Implement non-pharmacological measures as appropriate and evaluate response  - Consider cultural and social influences on pain and pain management  - Notify physician/advanced practitioner if interventions unsuccessful or patient reports new pain  Outcome: Progressing     Problem: INFECTION - ADULT  Goal: Absence or prevention of progression during hospitalization  Description: INTERVENTIONS:  - Assess and monitor for signs and symptoms of infection  - Monitor lab/diagnostic results  - Monitor all insertion sites, i.e. indwelling lines, tubes, and drains  - Monitor endotracheal if appropriate and nasal secretions for changes in amount and color  - [place] appropriate cooling/warming therapies per order  - Administer medications as ordered  - Instruct and encourage patient and family to use good hand hygiene technique  - Identify and instruct in appropriate isolation precautions for identified infection/condition  Outcome: Progressing

## 2023-09-28 NOTE — CASE MANAGEMENT
Case Management Discharge Planning Note    Patient name Luke Camejo  Location [facility] 828/[facility] 723-35 MRN [id number]  : 1940 Date 2023       Current Admission Date: 2023  Current Admission Diagnosis:Traumatic hematoma of left shoulder   Patient Active Problem List    Diagnosis Date Noted   • Hyponatremia 2023   • At risk for delirium 2023   • Fall 2023   • Traumatic hematoma of left shoulder 2023   • S/P aortic valve replacement 2023   • Epilepsy ([address]) 2023      LOS (days): 6  Geometric Mean LOS (GMLOS) (days): 3.80  Days to GMLOS:-2.5     OBJECTIVE:  Risk of Unplanned Readmission Score: 20.78         Current admission status: Inpatient   Preferred Pharmacy:   [address]  [address] [facility] [address]  Phone: [phone] Fax: [phone]    Primary Care Provider: Kathrine Spangler MD    Primary Insurance: [facility] REP  Secondary Insurance:     DISCHARGE DETAILS:    Pt clinically accepted to [address] [place] [address]. CM submitted for insurance approval  Pt is medically stable for d/c.

## 2023-09-28 NOTE — CASE MANAGEMENT
Case Management Discharge Planning Note    Patient name Lexis López  Location [facility] 828/[facility] 855-49 MRN [id number]  : 1940 Date 2023       Current Admission Date: 2023  Current Admission Diagnosis:Traumatic hematoma of left shoulder   Patient Active Problem List    Diagnosis Date Noted   • Hyponatremia 2023   • At risk for delirium 2023   • Fall 2023   • Traumatic hematoma of left shoulder 2023   • S/P aortic valve replacement 2023   • Epilepsy ([address]) 2023      LOS (days): 6  Geometric Mean LOS (GMLOS) (days): 3.80  Days to GMLOS:-2.6     OBJECTIVE:  Risk of Unplanned Readmission Score: 20.78         Current admission status: Inpatient   Preferred Pharmacy:   [address]  [address] [address]  Phone: [phone] Fax: [phone]    Primary Care Provider: Indigo Harrison MD    Primary Insurance: Anni Castillo [facility] Floor:     [address] [place] [address] Number: [id number]

## 2023-09-28 NOTE — CASE MANAGEMENT
[address] [facility] received request for authorization from Care Manager.   Authorization request for: SNF  Facility Name: [facility]  NPI: [id number]  Facility MD:  Dr. Enedina Torres   NPI: [id number]  [facility] initiated by contacting insurance: Zahra Cruz Via: Phone  Clinicals submitted via: [facility] has received approved authorization from insurance:   [id number]  Authorization #: [id number]  Start of Care: 9/29  Next Review Date: 10/4  Continued Stay Care Coordinator: none given   Submit next review to: Call [phone]   Care Manager notified: Grace Cook

## 2023-09-28 NOTE — PLAN OF CARE
Problem: PAIN - ADULT  Goal: Verbalizes/displays adequate comfort level or baseline comfort level  Description: Interventions:  - Encourage patient to monitor pain and request assistance  - Assess pain using appropriate pain scale  - Administer analgesics based on type and severity of pain and evaluate response  - Implement non-pharmacological measures as appropriate and evaluate response  - Consider cultural and social influences on pain and pain management  - Notify physician/advanced practitioner if interventions unsuccessful or patient reports new pain  Outcome: Progressing     Problem: INFECTION - ADULT  Goal: Absence or prevention of progression during hospitalization  Description: INTERVENTIONS:  - Assess and monitor for signs and symptoms of infection  - Monitor lab/diagnostic results  - Monitor all insertion sites, i.e. indwelling lines, tubes, and drains  - Monitor endotracheal if appropriate and nasal secretions for changes in amount and color  - [place] appropriate cooling/warming therapies per order  - Administer medications as ordered  - Instruct and encourage patient and family to use good hand hygiene technique  - Identify and instruct in appropriate isolation precautions for identified infection/condition  Outcome: Progressing  Goal: Absence of fever/infection during neutropenic period  Description: INTERVENTIONS:  - Monitor WBC    Outcome: Progressing     Problem: SAFETY ADULT  Goal: Patient will remain free of falls  Description: INTERVENTIONS:  - Educate patient/family on patient safety including physical limitations  - Instruct patient to call for assistance with activity   - Consult OT/PT to assist with strengthening/mobility   - Keep Call bell within reach  - Keep bed low and locked with side rails adjusted as appropriate  - Keep care items and personal belongings within reach  - Initiate and maintain comfort rounds  - Make Fall Risk Sign visible to staff  - Offer Toileting every 2 Hours, in advance of need  - Apply yellow socks and bracelet for high fall risk patients  - Consider moving patient to room near nurses station  Outcome: Progressing  Goal: Maintain or return to baseline ADL function  Description: INTERVENTIONS:  -  Assess patient's ability to carry out ADLs; assess patient's baseline for ADL function and identify physical deficits which impact ability to perform ADLs (bathing, care of mouth/teeth, toileting, grooming, dressing, etc.)  - Assess/evaluate cause of self-care deficits   - Assess range of motion  - Assess patient's mobility; develop plan if impaired  - Assess patient's need for assistive devices and provide as appropriate  - Encourage maximum independence but intervene and supervise when necessary  - Involve family in performance of ADLs  - Assess for home care needs following discharge   - Consider OT consult to assist with ADL evaluation and planning for discharge  - Provide patient education as appropriate  Outcome: Progressing  Goal: Maintains/Returns to pre admission functional level  Description: INTERVENTIONS:  - Perform BMAT or MOVE assessment daily.   - Set and communicate daily mobility goal to care team and patient/family/caregiver.    - Collaborate with rehabilitation services on mobility goals if consulted  - Out of bed to chair 3 times a day   - Out of bed for meals 3 times a day  - Out of bed for toileting  - Record patient progress and toleration of activity level   Outcome: Progressing     Problem: DISCHARGE PLANNING  Goal: Discharge to home or other facility with appropriate resources  Description: INTERVENTIONS:  - Identify barriers to discharge w/patient and caregiver  - Arrange for needed discharge resources and transportation as appropriate  - Identify discharge learning needs (meds, wound care, etc.)  - Arrange for interpretive services to assist at discharge as needed  - Refer to Case Management Department for coordinating discharge planning if the patient needs post-hospital services based on physician/advanced practitioner order or complex needs related to functional status, cognitive ability, or social support system  Outcome: Progressing     Problem: Knowledge Deficit  Goal: Patient/family/caregiver demonstrates understanding of disease process, treatment plan, medications, and discharge instructions  Description: Complete learning assessment and assess knowledge base. Interventions:  - Provide teaching at level of understanding  - Provide teaching via preferred learning methods  Outcome: Progressing     Problem: MOBILITY - ADULT  Goal: Maintain or return to baseline ADL function  Description: INTERVENTIONS:  -  Assess patient's ability to carry out ADLs; assess patient's baseline for ADL function and identify physical deficits which impact ability to perform ADLs (bathing, care of mouth/teeth, toileting, grooming, dressing, etc.)  - Assess/evaluate cause of self-care deficits   - Assess range of motion  - Assess patient's mobility; develop plan if impaired  - Assess patient's need for assistive devices and provide as appropriate  - Encourage maximum independence but intervene and supervise when necessary  - Involve family in performance of ADLs  - Assess for home care needs following discharge   - Consider OT consult to assist with ADL evaluation and planning for discharge  - Provide patient education as appropriate  Outcome: Progressing  Goal: Maintains/Returns to pre admission functional level  Description: INTERVENTIONS:  - Perform BMAT or MOVE assessment daily.   - Set and communicate daily mobility goal to care team and patient/family/caregiver.    - Collaborate with rehabilitation services on mobility goals if consulted  - Stand patient 3 times a day  - Ambulate patient 3 times a day  - Out of bed to chair 3 times a day   - Out of bed for meals 3 times a day  - Out of bed for toileting  - Record patient progress and toleration of activity level Outcome: Progressing     Problem: Prexisting or High Potential for Compromised Skin Integrity  Goal: Skin integrity is maintained or improved  Description: INTERVENTIONS:  - Identify patients at risk for skin breakdown  - Assess and monitor skin integrity  - Assess and monitor nutrition and hydration status  - Monitor labs   - Assess for incontinence   - Turn and reposition patient  - Assist with mobility/ambulation  - Relieve pressure over bony prominences  - Avoid friction and shearing  - Provide appropriate hygiene as needed including keeping skin clean and dry  - Evaluate need for skin moisturizer/barrier cream  - Collaborate with interdisciplinary team   - Patient/family teaching  - Consider wound care consult   Outcome: Progressing     Problem: Nutrition/Hydration-ADULT  Goal: Nutrient/Hydration intake appropriate for improving, restoring or maintaining nutritional needs  Description: Monitor and assess patient's nutrition/hydration status for malnutrition. Collaborate with interdisciplinary team and initiate plan and interventions as ordered. Monitor patient's weight and dietary intake as ordered or per policy. Utilize nutrition screening tool and intervene as necessary. Determine patient's food preferences and provide high-protein, high-caloric foods as appropriate.      INTERVENTIONS:  - Monitor oral intake, urinary output, labs, and treatment plans  - Assess nutrition and hydration status and recommend course of action  - Evaluate amount of meals eaten  - Assist patient with eating if necessary   - Allow adequate time for meals  - Recommend/ encourage appropriate diets, oral nutritional supplements, and vitamin/mineral supplements  - Order, calculate, and assess calorie counts as needed  - Recommend, monitor, and adjust tube feedings and TPN/PPN based on assessed needs  - Assess need for intravenous fluids  - Provide specific nutrition/hydration education as appropriate  - Include patient/family/caregiver in decisions related to nutrition  Outcome: Progressing

## 2023-09-29 VITALS
HEIGHT: 60 IN | DIASTOLIC BLOOD PRESSURE: 160 MMHG | WEIGHT: 126 LBS | OXYGEN SATURATION: 96 % | RESPIRATION RATE: 16 BRPM | HEART RATE: 103 BPM | BODY MASS INDEX: 24.74 KG/M2 | SYSTOLIC BLOOD PRESSURE: 187 MMHG | TEMPERATURE: 98.5 F

## 2023-09-29 LAB
ANION GAP SERPL CALCULATED.3IONS-SCNC: 4 MMOL/L
BUN SERPL-MCNC: 8 MG/DL (ref 5–25)
CALCIUM SERPL-MCNC: 8 MG/DL (ref 8.4–10.2)
CHLORIDE SERPL-SCNC: 100 MMOL/L (ref 96–108)
CO2 SERPL-SCNC: 31 MMOL/L (ref 21–32)
CREAT SERPL-MCNC: 0.52 MG/DL (ref 0.6–1.3)
GFR SERPL CREATININE-BSD FRML MDRD: 88 ML/MIN/1.73SQ M
GLUCOSE SERPL-MCNC: 88 MG/DL (ref 65–140)
INR PPP: 2.57 (ref 0.84–1.19)
PHOSPHATE SERPL-MCNC: 2.5 MG/DL (ref 2.3–4.1)
POTASSIUM SERPL-SCNC: 4 MMOL/L (ref 3.5–5.3)
PROTHROMBIN TIME: 27.9 SECONDS (ref 11.6–14.5)
SODIUM SERPL-SCNC: 135 MMOL/L (ref 135–147)

## 2023-09-29 PROCEDURE — 80048 BASIC METABOLIC PNL TOTAL CA: CPT | Performed by: INTERNAL MEDICINE

## 2023-09-29 PROCEDURE — NC001 PR NO CHARGE: Performed by: STUDENT IN AN ORGANIZED HEALTH CARE EDUCATION/TRAINING PROGRAM

## 2023-09-29 PROCEDURE — 84100 ASSAY OF PHOSPHORUS: CPT | Performed by: INTERNAL MEDICINE

## 2023-09-29 PROCEDURE — 85610 PROTHROMBIN TIME: CPT | Performed by: STUDENT IN AN ORGANIZED HEALTH CARE EDUCATION/TRAINING PROGRAM

## 2023-09-29 RX ADMIN — LAMOTRIGINE 100 MG: 100 TABLET ORAL at 08:41

## 2023-09-29 RX ADMIN — OXYCODONE HYDROCHLORIDE AND ACETAMINOPHEN 500 MG: 500 TABLET ORAL at 08:41

## 2023-09-29 RX ADMIN — SODIUM CHLORIDE 2 G: 1 TABLET ORAL at 08:46

## 2023-09-29 RX ADMIN — DOCUSATE SODIUM 100 MG: 100 CAPSULE, LIQUID FILLED ORAL at 08:41

## 2023-09-29 RX ADMIN — FLUTICASONE FUROATE AND VILANTEROL TRIFENATATE 1 PUFF: 100; 25 POWDER RESPIRATORY (INHALATION) at 08:37

## 2023-09-29 RX ADMIN — ATORVASTATIN CALCIUM 80 MG: 80 TABLET, FILM COATED ORAL at 08:41

## 2023-09-29 RX ADMIN — OXYBUTYNIN CHLORIDE 5 MG: 5 TABLET ORAL at 08:40

## 2023-09-29 RX ADMIN — SERTRALINE 100 MG: 100 TABLET, FILM COATED ORAL at 08:41

## 2023-09-29 RX ADMIN — DILTIAZEM HYDROCHLORIDE 90 MG: 90 CAPSULE, EXTENDED RELEASE ORAL at 08:32

## 2023-09-29 RX ADMIN — PANTOPRAZOLE SODIUM 20 MG: 20 TABLET, DELAYED RELEASE ORAL at 06:04

## 2023-09-29 RX ADMIN — LEVOTHYROXINE SODIUM 125 MCG: 125 TABLET ORAL at 06:04

## 2023-09-29 NOTE — ASSESSMENT & PLAN NOTE
· On warfarin, held initially due to bleed  · Warfarin resumed on 9/23, bridged with therapeutic lovenox, goal INR 2.5-3.5  · At goal 9/29, currently 2.57 today    Lab Results   Component Value Date    INR 2.57 (H) 09/29/2023    INR 2.00 (H) 09/28/2023    INR 1.57 (H) 09/27/2023    PROTIME 27.9 (H) 09/29/2023    PROTIME 22.9 (H) 09/28/2023    PROTIME 19.0 (H) 09/27/2023

## 2023-09-29 NOTE — DISCHARGE INSTR - AVS FIRST PAGE
Trauma Discharge Instructions:    Please follow-up as instructed. Your appointment is next week on 10/5     activity:  - PT and OT evaluation and treatment as indicated. - You may resume activity as tolerated. - Walking and normal light activities are encouraged. - Normal daily activities including climbing steps are okay. - No driving until no longer using pain medications. Return to work:    - You may return to work once cleared by the [facility]. Diet:    - You may resume your normal diet. Medications:  - You should continue your current medication regimen after discharge unless otherwise instructed. Please refer to your discharge medication list for further details. - Please take the pain medications as directed. - You are encouraged to use non-narcotic pain medications first and whenever possible. Reserve the use of narcotic pain medication for moderate to severe pain not controlled by non-narcotic medications.  - No driving while taking narcotic pain medications. - You may become constipated, especially if taking pain medications. You may take any over the counter stool softeners or laxatives as needed. Examples: Milk of Magnesia, Colace, Senna. Additional Instructions:  - May shower daily.  - If you have any questions or concerns after discharge please call the office.  - Call office or return to ER if fever greater than 101, chills, persistent nausea/vomiting, worsening/uncontrollable pain, develop productive cough, increasing shortness of breath, difficulty breathing, and/or increasing redness or purulent/foul smelling drainage from incision(s). -Please continue taking your warfarin as prescribed.  Your facility should check your INR regularly and adjust your warfarin to a goal INR of 2.5-3.5.  -Please go to your appointment in the trauma office next week on 10/5 for wound check and possible suture removal

## 2023-09-29 NOTE — ASSESSMENT & PLAN NOTE
· Patient chronically hyponatremic. · Serum osmolality mildly decreased. Urine osmolality and urine sodium WNL. · Patient started on NS at 100 mL/hour and salt tabs. · Nephrology consulted, recommendations appreciated.   · Na wnl 9/29/23    Lab Results   Component Value Date    SODIUM 135 09/29/2023    SODIUM 134 (L) 09/28/2023    SODIUM 129 (L) 09/27/2023    SODIUM 130 (L) 09/27/2023    SODIUM 128 (L) 09/27/2023

## 2023-09-29 NOTE — TREATMENT TEAM
Renal short note-patient was discharged prior to being seen. I have sent a message to the renal office for outpatient follow-up in 3 to 4 weeks with BMP in 1 week.

## 2023-09-29 NOTE — PLAN OF CARE
Problem: PAIN - ADULT  Goal: Verbalizes/displays adequate comfort level or baseline comfort level  Description: Interventions:  - Encourage patient to monitor pain and request assistance  - Assess pain using appropriate pain scale  - Administer analgesics based on type and severity of pain and evaluate response  - Implement non-pharmacological measures as appropriate and evaluate response  - Consider cultural and social influences on pain and pain management  - Notify physician/advanced practitioner if interventions unsuccessful or patient reports new pain  Outcome: Progressing     Problem: INFECTION - ADULT  Goal: Absence or prevention of progression during hospitalization  Description: INTERVENTIONS:  - Assess and monitor for signs and symptoms of infection  - Monitor lab/diagnostic results  - Monitor all insertion sites, i.e. indwelling lines, tubes, and drains  - Monitor endotracheal if appropriate and nasal secretions for changes in amount and color  - [place] appropriate cooling/warming therapies per order  - Administer medications as ordered  - Instruct and encourage patient and family to use good hand hygiene technique  - Identify and instruct in appropriate isolation precautions for identified infection/condition  Outcome: Progressing

## 2023-09-29 NOTE — ASSESSMENT & PLAN NOTE
· PT/OT eval, recommending rehab  · CM for placement, going to [address] [place] [address] today at 10:30AM

## 2023-09-29 NOTE — PROGRESS NOTES
Nursing report given to Mitra Andre at [facility]. Patient to be discharged at 56. D/C paperwork faxed.

## 2023-09-29 NOTE — DISCHARGE SUMMARY
[address] [facility]  Discharge- Louis Ann 1940, 80 y.o. female MRN: [id number]  Unit/Bed#: [facility] 828-01 Encounter: [id number]  Primary Care Provider: Teo Moody MD   Date and time admitted to hospital: 9/22/2023  4:25 AM    Hyponatremia  Assessment & Plan  · Patient chronically hyponatremic. · Serum osmolality mildly decreased. Urine osmolality and urine sodium WNL. · Patient started on NS at 100 mL/hour and salt tabs. · Nephrology consulted, recommendations appreciated. · Na wnl 9/29/23    Lab Results   Component Value Date    SODIUM 135 09/29/2023    SODIUM 134 (L) 09/28/2023    SODIUM 129 (L) 09/27/2023    SODIUM 130 (L) 09/27/2023    SODIUM 128 (L) 09/27/2023         At risk for delirium  Assessment & Plan  · Patient noted to be intermittently confused on initial assessment by geriatrics. · Patient has remained alert and oriented. · Metabolic encephalopathy ruled out. Epilepsy ([address])  Assessment & Plan  · Continue home meds    S/P aortic valve replacement  Assessment & Plan  · On warfarin, held initially due to bleed  · Warfarin resumed on 9/23, bridged with therapeutic lovenox, goal INR 2.5-3.5  · At goal 9/29, currently 2.57 today    Lab Results   Component Value Date    INR 2.57 (H) 09/29/2023    INR 2.00 (H) 09/28/2023    INR 1.57 (H) 09/27/2023    PROTIME 27.9 (H) 09/29/2023    PROTIME 22.9 (H) 09/28/2023    PROTIME 19.0 (H) 09/27/2023         Fall  Assessment & Plan  · PT/OT eval, recommending rehab  · CM for placement, going to [address] [place] [address] today at 10:30AM    * Traumatic hematoma of left shoulder  Assessment & Plan  · With associated laceration status post bedside closure 9/22  · Compression wrap applied  · Trend hgb   · Monitor for signs infection      Subjective:  Patient seen and examined at bedside this morning. Pain well controlled. Denies fever/chills, denies nausea vomiting or diarrhea. Denies chest pain or shortness of breath.   Patient not having any difficulty with urinating, had a bowel movement this morning. Patient says she is ready to go to [facility].    Objective:  GENERAL APPEARANCE: NAD  NEURO: GCS 15 no focal deficits  HEENT: NCAT  CV: well perfused peripherally  LUNGS: no increased WOB, no stridor  GI: no abdominal distension  MSK: no TTP of extremities, full ROM. Left shoulder with significant amount of ecchymosis, laceration with healing scab and sutures clean dry intact. SKIN: warm dry intact      Medical Problems     Resolved Problems  Date Reviewed: 9/24/2023   None         Admission Date:   Admission Orders (From admission, onward)     Ordered        09/22/23 0537  Inpatient Admission  Once                        Admitting Diagnosis: Fall, initial encounter [W19. XXXA]  Unspecified multiple injuries, initial encounter [T07. XXXA]    HPI: Per H&P by Mariama Rey:  "Jossy Kirkpatrick is a 80 y.o. female transfer from [facility] with PMH of  aortic valve replacement (warfarin), COPD, permanent A. fib, permanent pacemaker implantation, hypertension, hyperlipidemia, SAM with use of BiPAP and epilepsy who presents status post fall with left shoulder laceration. She states she was walking across her room when she fell, striking her left shoulder against a piece of furniture. Unclear head strike. Unclear LOC."    Procedures Performed: No orders of the defined types were placed in this encounter. Summary of Hospital Course:   Please see progress notes for detailed explanation of hospital course. Condition at Discharge: stable     Discharge instructions/Information to patient and family:   See after visit summary for information provided to patient and family. Provisions for Follow-Up Care:  See after visit summary for information related to follow-up care and any pertinent home health orders. PCP: Krystal Claude, MD    Disposition: See After Visit Summary for discharge disposition information.     Planned Readmission: No    Discharge Statement   I spent 30 minutes discharging the patient. This time was spent on the day of discharge. I had direct contact with the patient on the day of discharge. Additional documentation is required if more than 30 minutes were spent on discharge. Discharge Medications:  See after visit summary for reconciled discharge medications provided to patient and family.

## 2023-10-02 ENCOUNTER — TELEPHONE (OUTPATIENT)
Dept: NEPHROLOGY | Facility: CLINIC | Age: 83
End: 2023-10-02

## 2023-10-02 ENCOUNTER — NURSING HOME VISIT (OUTPATIENT)
Dept: FAMILY MEDICINE CLINIC | Facility: CLINIC | Age: 83
End: 2023-10-02
Payer: COMMERCIAL

## 2023-10-02 DIAGNOSIS — S40.012A TRAUMATIC HEMATOMA OF LEFT SHOULDER, INITIAL ENCOUNTER: ICD-10-CM

## 2023-10-02 DIAGNOSIS — E87.1 HYPONATREMIA: ICD-10-CM

## 2023-10-02 DIAGNOSIS — E87.1 HYPONATREMIA: Primary | ICD-10-CM

## 2023-10-02 DIAGNOSIS — G40.909 NONINTRACTABLE EPILEPSY WITHOUT STATUS EPILEPTICUS, UNSPECIFIED EPILEPSY TYPE (HCC): ICD-10-CM
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
## 2023-10-02 DIAGNOSIS — Z95.2 S/P AORTIC VALVE REPLACEMENT: ICD-10-CM

## 2023-10-02 PROCEDURE — 99306 1ST NF CARE HIGH MDM 50: CPT | Performed by: FAMILY MEDICINE

## 2023-10-02 NOTE — TELEPHONE ENCOUNTER
Talked with nurse at [facility] in [facility]. Pt on C-wing. Order for [facility] faxed to be done in one week post discharge (fax [phone])    ----- Message from Leon Rondon MD sent at 9/29/2023 10:31 AM EDT -----  Hello    Can the patient have posthospital follow-up with anybody in 3 to 4 weeks. BMP in 1 week for hyponatremia follow-up.   Thank you

## 2023-10-02 NOTE — UTILIZATION REVIEW
NOTIFICATION OF ADMISSION DISCHARGE   This is a Notification of Discharge from [facility]. Please be advised that this patient has been discharge from our facility. Below you will find the admission and discharge date and time including the patient’s disposition. UTILIZATION REVIEW CONTACT:  Yael Estrada  Utilization   Network Utilization Review Department  Phone: [phone] x carefully listen to the prompts. All voicemails are confidential.  Email: [email or web address]     ADMISSION INFORMATION  PRESENTATION DATE: 9/22/2023  4:25 AM  OBERVATION ADMISSION DATE:   INPATIENT ADMISSION DATE: 9/22/23  5:37 AM   DISCHARGE DATE: 9/29/2023 10:35 AM   DISPOSITION:Non [facility] SNF/TCU/SNU    IMPORTANT INFORMATION:  Send all requests for admission clinical reviews, approved or denied determinations and any other requests to dedicated fax number below belonging to the campus where the patient is receiving treatment.  List of dedicated fax numbers:  [place] DENIALS (Administrative/Medical Necessity) [phone] [address] [facility] (Maternity/NICU/Pediatrics) [phone]   [facility] [phone]   [facility] [phone] [address] [facility] [phone] [address] [facility] [phone]   [facility] [phone]   [address] [facility] [address] [facility] [phone]   [address] [place] [address] [phone]   [address] [place] [address] [phone]   [address] [facility] [address] [phone]

## 2023-10-02 NOTE — PROGRESS NOTES
[facility]  [address]  Facility: [facility]    NAME: Maci Tony  AGE: 80 y.o. SEX: female    DATE OF ENCOUNTER: 10/2/2023    Code status:  Full Code    Assessment and Plan     1. Fall, initial encounter    2. Traumatic hematoma of left shoulder, initial encounter    3. S/P aortic valve replacement    4. Hyponatremia    5. Nonintractable epilepsy without status epilepticus, unspecified epilepsy type ([address])        All medications and routine orders were reviewed and updated as needed. Plan discussed with: Patient    Chief Complaint     Seen for admission at [facility]    History of Present Illness     40-year-old female here after hospitalization following a fall with a traumatic hematoma of the left shoulder. The patient has a history of aortic valve replacement and is anticoagulated with warfarin. [facility] and was attempting to provide self-care when she tumbled forward landing on her knee and left shoulder. She notes decreased range of motion and stiffness as well as bleeding. Her bowel habits are stable. She had no loss of consciousness. She had no head trauma. She denies dysuria. She has no shortness of breath. She has a chronic cough from a history of COPD. She benefits from inhalation therapy.     HISTORY:  Past Medical History:   Diagnosis Date   • Anemia    • Anxiety disorder, unspecified    • Anxiety disorder, unspecified    • Cardiac arrest, cause unspecified ([address])    • Chronic obstructive pulmonary disease, unspecified ([address])    • Constipation    • Essential (primary) hypertension    • GERD (gastroesophageal reflux disease)    • Hypo-osmolality and hyponatremia    • Hypothyroidism    • Nontraumatic hematoma of soft tissue    • Other seizures (HCC)    • Persistent atrial fibrillation (HCC)    • Unspecified urinary incontinence      Past Surgical History:   Procedure Laterality Date   • APPENDECTOMY     • CARDIAC PACEMAKER PLACEMENT     • FEMUR FRACTURE SURGERY       Family History   Family history unknown: Yes     Social History     Socioeconomic History   • Marital status:      Spouse name: None   • Number of children: None   • Years of education: None   • Highest education level: None   Occupational History   • None   Tobacco Use   • Smoking status: Never   • Smokeless tobacco: Never   Vaping Use   • Vaping Use: Never used   Substance and Sexual Activity   • Alcohol use: Not Currently   • Drug use: Never   • Sexual activity: Not Currently   Other Topics Concern   • None   Social History Narrative   • None     Social Determinants of Health     Financial Resource Strain: Not on file   Food Insecurity: No Food Insecurity (9/25/2023)    Hunger Vital Sign    • Worried About Running Out of Food in the Last Year: Never true    • Ran Out of Food in the Last Year: Never true   Transportation Needs: No Transportation Needs (9/25/2023)    PRAPARE - Transportation    • Lack of Transportation (Medical): No    • Lack of Transportation (Non-Medical): No   Physical Activity: Not on file   Stress: Not on file   Social Connections: Not on file   Intimate Partner Violence: Not on file   Housing Stability: Low Risk  (9/25/2023)    Housing Stability Vital Sign    • Unable to Pay for Housing in the Last Year: No    • Number of Places Lived in the Last Year: 1    • Unstable Housing in the Last Year: No       Allergies: Allergies   Allergen Reactions   • Erythromycin Photosensitivity   • Shellfish-Derived Products - Food Allergy Hives   • Ephedrine Rash   • Iodinated Contrast Media Rash   • Latex Rash   • Nsaids Rash   • Povidone Iodine Rash   • Salicylates Rash   • Shrimp (Diagnostic) - Food Allergy Rash   • Sympathomimetics Rash   • Tiotropium Rash   • Tositumomab Rash       Review of Systems     Review of Systems   Constitutional: Negative for activity change, appetite change, chills, diaphoresis, fatigue and unexpected weight change. HENT: Negative for congestion, ear discharge, ear pain, hearing loss, nosebleeds and rhinorrhea. Eyes: Negative for pain, redness, itching and visual disturbance. Respiratory: Positive for cough. Negative for choking, chest tightness and shortness of breath. Cardiovascular: Negative for chest pain and leg swelling. Gastrointestinal: Negative for abdominal pain, blood in stool, constipation, diarrhea and nausea. Endocrine: Negative for cold intolerance, polydipsia and polyphagia. Genitourinary: Negative for dysuria, frequency, hematuria and urgency. Musculoskeletal: Positive for arthralgias and gait problem. Negative for back pain, joint swelling, neck pain and neck stiffness. Skin: Negative for color change and rash. Allergic/Immunologic: Negative for environmental allergies and food allergies. Neurological: Positive for weakness. Negative for dizziness, tremors, seizures, speech difficulty, numbness and headaches. Hematological: Negative for adenopathy. Bruises/bleeds easily. Psychiatric/Behavioral: Negative for behavioral problems, dysphoric mood, hallucinations and self-injury. Medications and orders     All medications reviewed and updated in long-term EMR. Objective     Vitals: per nursing home record    Physical Exam  Constitutional:       Appearance: She is well-developed. HENT:      Head: Normocephalic and atraumatic. Right Ear: External ear normal.      Left Ear: External ear normal.      Mouth/Throat:      Pharynx: No oropharyngeal exudate. Eyes:      General: No scleral icterus. Right eye: No discharge. Left eye: No discharge. Conjunctiva/sclera: Conjunctivae normal.      Pupils: Pupils are equal, round, and reactive to light. Neck:      Thyroid: No thyromegaly. Cardiovascular:      Rate and Rhythm: Normal rate. Rhythm irregular. Heart sounds: Murmur heard. No gallop.    Pulmonary:      Effort: Pulmonary effort is normal. No respiratory distress. Breath sounds: No wheezing or rales. Comments: Bilateral crackles  Abdominal:      General: Bowel sounds are normal.      Palpations: Abdomen is soft. There is no mass. Tenderness: There is no guarding or rebound. Musculoskeletal:         General: Tenderness present. No deformity. Cervical back: Normal range of motion and neck supple. Comments: Decreased range of motion left shoulder   Lymphadenopathy:      Cervical: No cervical adenopathy. Skin:     General: Skin is warm and dry. Findings: Bruising present. No rash. Neurological:      Mental Status: She is alert and oriented to person, place, and time. Cranial Nerves: No cranial nerve deficit. Motor: Weakness present. Coordination: Coordination normal.      Deep Tendon Reflexes: Reflexes are normal and symmetric. Reflexes normal.   Psychiatric:         Behavior: Behavior normal.         Thought Content: Thought content normal.         Judgment: Judgment normal.         Pertinent Laboratory/Diagnostic Studies: The following labs/studies were reviewed please see chart or hospital paperwork for details. Diagnostic studies from the hospital were reviewed    - Admit for PT OT and medical therapy.   We will monitor her labs     Steffanie Cornejo DO  10/2/2023 3:59 PM

## 2023-10-03 ENCOUNTER — TELEPHONE (OUTPATIENT)
Dept: NEPHROLOGY | Facility: CLINIC | Age: 83
End: 2023-10-03

## 2023-10-03 NOTE — TELEPHONE ENCOUNTER
Spoke with Shaji at [facility] and she tried to connect me with [employer] and is giving them message that we wanted to schedule hospital follow up. I also supplied the number for [facility] [phone]. First available was Feb 26 with Dr. Andres People

## 2023-10-04 NOTE — TELEPHONE ENCOUNTER
Called [employer] again and [employer] not available. Left a voicemail with [phone] contact to schedule [phone] and  My contact number [phone]. I also gave the message to Zenaida Curtis the  at [phone].

## 2023-10-05 ENCOUNTER — OFFICE VISIT (OUTPATIENT)
Dept: SURGERY | Facility: CLINIC | Age: 83
End: 2023-10-05
Payer: COMMERCIAL

## 2023-10-05 VITALS — HEIGHT: 60 IN | TEMPERATURE: 97.3 F | BODY MASS INDEX: 24.61 KG/M2

## 2023-10-05 DIAGNOSIS — S40.012A: Primary | ICD-10-CM

## 2023-10-05 PROCEDURE — 99213 OFFICE O/P EST LOW 20 MIN: CPT | Performed by: SURGERY

## 2023-10-05 NOTE — ASSESSMENT & PLAN NOTE
- left shoulder hematoma is slowly resolving  - all sutures removed from overlying laceration  - no s/s of infection  - discussed natural healing process of hematomas and that it may take 2-3 months for complete resolution  - f/u with trauma on an as needed basis.

## 2023-10-05 NOTE — TELEPHONE ENCOUNTER
Sanket Stanley daughter called the office to schedule a hospital follow up here in [address] office. There are no openings until Feb 2024 with a provider or [facility]. [facility] , please advise when we can get her in.

## 2023-10-05 NOTE — TELEPHONE ENCOUNTER
Spoke to [employer] daughter. Jadyn Peck said she will be discharged today or tomorrow. She took the number to schedule at [facility] for hospital follow up and I supplied her with [facility] should she need us.  BMP was taken care of per notes by Earl Arthur.

## 2023-10-05 NOTE — PROGRESS NOTES
Office Visit - General Surgery  Primo Munoz MRN: [id number]  Encounter: [id number]    Assessment and Plan  Problem List Items Addressed This Visit        Other    Traumatic hematoma of left shoulder - Primary     - left shoulder hematoma is slowly resolving  - all sutures removed from overlying laceration  - no s/s of infection  - discussed natural healing process of hematomas and that it may take 2-3 months for complete resolution  - f/u with trauma on an as needed basis. Chief Complaint:  Primo Munoz is a 80 y.o. female who presents for Follow-up (Left shoulder hematoma and suture removal.)    Subjective  79 y/o female s/p fall when she sustained a L shoulder hematoma. She had a laceration overlying it which was repaired. It has been healing well. She has some scant dark bloody drainage. She denies fevers/chills. No new complaints otherwise.      Past Medical History:   Diagnosis Date   • Anemia    • Anxiety disorder, unspecified    • Anxiety disorder, unspecified    • Cardiac arrest, cause unspecified ([address])    • Chronic obstructive pulmonary disease, unspecified ([address])    • Constipation    • Essential (primary) hypertension    • GERD (gastroesophageal reflux disease)    • Hypo-osmolality and hyponatremia    • Hypothyroidism    • Nontraumatic hematoma of soft tissue    • Other seizures (HCC)    • Persistent atrial fibrillation (HCC)    • Unspecified urinary incontinence        Past Surgical History:   Procedure Laterality Date   • APPENDECTOMY     • CARDIAC PACEMAKER PLACEMENT     • FEMUR FRACTURE SURGERY         Family History   Family history unknown: Yes       Social History     Tobacco Use   • Smoking status: Never   • Smokeless tobacco: Never   Vaping Use   • Vaping Use: Never used   Substance Use Topics   • Alcohol use: Not Currently   • Drug use: Never        Medications  Current Outpatient Medications on File Prior to Visit   Medication Sig Dispense Refill   • acetaminophen (TYLENOL) 325 mg tablet Take 650 mg by mouth every 8 (eight) hours as needed for mild pain     • albuterol (PROVENTIL HFA,VENTOLIN HFA) 90 mcg/act inhaler Inhale 2 puffs every 6 (six) hours as needed for wheezing     • ascorbic acid (VITAMIN C) 500 MG tablet Take 500 mg by mouth daily     • atorvastatin (LIPITOR) 80 mg tablet Take 80 mg by mouth daily     • calcium carbonate (OS-JERARDO) 600 MG tablet Take 600 mg by mouth daily     • diltiazem (DILACOR XR) 180 MG 24 hr capsule Take 180 mg by mouth daily     • Fluticasone-Salmeterol (Advair) 100-50 mcg/dose inhaler Inhale 1 puff 2 (two) times a day Rinse mouth after use. • furosemide (LASIX) 40 mg tablet Take 40 mg by mouth daily     • lactase (LACTAID) 3,000 units tablet Take 9,000 Units by mouth 3 (three) times a day as needed     • lamoTRIgine (LaMICtal) 100 mg tablet Take 100 mg by mouth 2 (two) times a day     • levothyroxine 125 mcg tablet Take 125 mcg by mouth daily     • Lidocaine 4 % PTCH Apply 1 patch topically daily at bedtime On for 12 hrs, off for 12 hrs     • montelukast (SINGULAIR) 10 mg tablet Take 10 mg by mouth daily at bedtime     • Multiple Vitamin (multivitamin) tablet Take 1 tablet by mouth daily     • omeprazole (PriLOSEC) 20 mg delayed release capsule Take 20 mg by mouth daily     • oxybutynin (DITROPAN) 5 mg tablet Take 5 mg by mouth 3 (three) times a day     • senna (SENOKOT) 8.6 MG tablet Take 1 tablet by mouth daily as needed for constipation     • sertraline (ZOLOFT) 100 mg tablet Take 100 mg by mouth daily     • warfarin (COUMADIN) 2.5 mg tablet Take by mouth daily       No current facility-administered medications on file prior to visit.        Allergies  Allergies   Allergen Reactions   • Erythromycin Photosensitivity   • Shellfish-Derived Products - Food Allergy Hives   • Ephedrine Rash   • Iodinated Contrast Media Rash   • Latex Rash   • Nsaids Rash   • Povidone Iodine Rash   • Salicylates Rash   • Shrimp (Diagnostic) - Food Allergy Rash   • Sympathomimetics Rash   • Tiotropium Rash   • Tositumomab Rash       Review of Systems   Constitutional: Negative. HENT: Negative. Eyes: Negative. Respiratory: Negative. Cardiovascular: Negative. Gastrointestinal: Negative. Genitourinary: Negative. Musculoskeletal:        + L shoulder hematoma uncomfortable, healing   Skin: Positive for wound. + wound with sutures, scant bloody drainage at times   Neurological: Negative. Objective  Vitals:    10/05/23 1240   Temp: (!) 97.3 °F (36.3 °C)       Physical Exam  Constitutional:       Appearance: Normal appearance. HENT:      Head: Normocephalic. Nose: Nose normal.      Mouth/Throat:      Mouth: Mucous membranes are moist.   Cardiovascular:      Rate and Rhythm: Normal rate and regular rhythm. Pulses: Normal pulses. Pulmonary:      Effort: Pulmonary effort is normal. No respiratory distress. Breath sounds: Normal breath sounds. Abdominal:      General: Abdomen is flat. Palpations: Abdomen is soft. Musculoskeletal:      Cervical back: Normal range of motion and neck supple. No tenderness. Comments: + L shoulder hematoma baseball sized, firm; + central area of necrotic tissue. All sutures removed from this area. Scant amount of dark hematoma expressed from one area of the wound.   + NVI distally in LUE; no edema. Skin:     General: Skin is warm and dry. Neurological:      General: No focal deficit present. Mental Status: She is alert and oriented to person, place, and time. Sensory: No sensory deficit. Motor: No weakness.    Psychiatric:         Behavior: Behavior normal.

## 2023-10-06 ENCOUNTER — NURSING HOME VISIT (OUTPATIENT)
Dept: FAMILY MEDICINE CLINIC | Facility: CLINIC | Age: 83
End: 2023-10-06
Payer: COMMERCIAL

## 2023-10-06 DIAGNOSIS — S40.012A TRAUMATIC HEMATOMA OF LEFT SHOULDER, INITIAL ENCOUNTER: ICD-10-CM

## 2023-10-06 DIAGNOSIS — G40.909 NONINTRACTABLE EPILEPSY WITHOUT STATUS EPILEPTICUS, UNSPECIFIED EPILEPSY TYPE (HCC): ICD-10-CM
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
## 2023-10-06 DIAGNOSIS — Z95.2 S/P AORTIC VALVE REPLACEMENT: ICD-10-CM

## 2023-10-06 DIAGNOSIS — E87.1 HYPONATREMIA: ICD-10-CM

## 2023-10-06 PROCEDURE — 99315 NF DSCHRG MGMT 30 MIN/LESS: CPT | Performed by: FAMILY MEDICINE

## 2023-10-06 NOTE — PATIENT INSTRUCTIONS
All sutures removed at todays visit. Continue local wound care, keeping site clean and dry. Continue Tylenol 650 mg q6h prn pain. Seek medical attn if you develop increased pain, redness, swelling or purulent drainage from the site. Hematoma may take 2-3 months to fully resolve.

## 2023-10-06 NOTE — TELEPHONE ENCOUNTER
BMP drawn today results still pending this morning. Patient will be d/c today to [address]: [phone].

## 2023-10-06 NOTE — TELEPHONE ENCOUNTER
I spoke to patient daughter Kamron Culver, and we will await for BMP to be reviewed to determine f/u.

## 2023-10-06 NOTE — PROGRESS NOTES
[address]  [address]  Facility: [facility]    NAME: Hussain Jane  AGE: 80 y.o. SEX: female    DATE OF ENCOUNTER: 10/6/2023    Code status:  Full Code    Assessment and Plan     1. Fall, initial encounter    2. Traumatic hematoma of left shoulder, initial encounter    3. Nonintractable epilepsy without status epilepticus, unspecified epilepsy type ([address])    4. S/P aortic valve replacement    5. Hyponatremia        All medications and routine orders were reviewed and updated as needed. Plan discussed with: Patient    Chief Complaint     Interim evaluation    History of Present Illness     The patient will be discharging back to [facility] today. She is feeling stronger but still has some tenderness in her left shoulder. Her bowel habits are stable. She denies dyspnea. She is quite engaging and is mentally and physically active. She is confident in her ability to return to independent living    The following portions of the patient's history were reviewed and updated as appropriate: current medications, past family history, past medical history, past social history, past surgical history and problem list.    Allergies: Allergies   Allergen Reactions   • Erythromycin Photosensitivity   • Shellfish-Derived Products - Food Allergy Hives   • Ephedrine Rash   • Iodinated Contrast Media Rash   • Latex Rash   • Nsaids Rash   • Povidone Iodine Rash   • Salicylates Rash   • Shrimp (Diagnostic) - Food Allergy Rash   • Sympathomimetics Rash   • Tiotropium Rash   • Tositumomab Rash       Review of Systems     Review of Systems   Constitutional: Negative for activity change, appetite change, chills, diaphoresis, fatigue and unexpected weight change. HENT: Negative for congestion, ear discharge, ear pain, hearing loss, nosebleeds and rhinorrhea. Eyes: Negative for pain, redness, itching and visual disturbance.    Respiratory: Negative for cough, choking, chest tightness and shortness of breath. Cardiovascular: Negative for chest pain and leg swelling. Gastrointestinal: Negative for abdominal pain, blood in stool, constipation, diarrhea and nausea. Endocrine: Negative for cold intolerance, polydipsia and polyphagia. Genitourinary: Negative for dysuria, frequency, hematuria and urgency. Musculoskeletal: Positive for arthralgias. Negative for back pain, gait problem, joint swelling, neck pain and neck stiffness. Skin: Negative for color change and rash. Allergic/Immunologic: Negative for environmental allergies and food allergies. Neurological: Positive for weakness. Negative for dizziness, tremors, seizures, speech difficulty, numbness and headaches. Hematological: Negative for adenopathy. Does not bruise/bleed easily. Psychiatric/Behavioral: Negative for behavioral problems, dysphoric mood, hallucinations and self-injury. Medications and orders     All medications reviewed and updated in long term EMR. Objective     Vitals: per nursing home records    Physical Exam  Constitutional:       Appearance: She is well-developed. HENT:      Head: Normocephalic and atraumatic. Right Ear: External ear normal.      Left Ear: External ear normal.      Mouth/Throat:      Pharynx: No oropharyngeal exudate. Eyes:      General: No scleral icterus. Right eye: No discharge. Left eye: No discharge. Conjunctiva/sclera: Conjunctivae normal.      Pupils: Pupils are equal, round, and reactive to light. Neck:      Thyroid: No thyromegaly. Cardiovascular:      Rate and Rhythm: Normal rate and regular rhythm. Heart sounds: Murmur heard. No gallop. Pulmonary:      Effort: Pulmonary effort is normal. No respiratory distress. Breath sounds: Normal breath sounds. No wheezing or rales. Abdominal:      General: Bowel sounds are normal.      Palpations: Abdomen is soft. There is no mass. Tenderness:  There is no guarding or rebound. Musculoskeletal:         General: Tenderness present. No deformity. Normal range of motion. Cervical back: Normal range of motion and neck supple. Lymphadenopathy:      Cervical: No cervical adenopathy. Skin:     General: Skin is warm and dry. Findings: Bruising present. No rash. Neurological:      Mental Status: She is alert and oriented to person, place, and time. Cranial Nerves: No cranial nerve deficit. Coordination: Coordination normal.      Deep Tendon Reflexes: Reflexes are normal and symmetric. Reflexes normal.   Psychiatric:         Behavior: Behavior normal.         Thought Content: Thought content normal.         Judgment: Judgment normal.         Pertinent Laboratory/Diagnostic Studies: The following studies were reviewed please see chart or hospital paperwork for details.     Space for lab dictation Labs are normal    - Discharge planning    Josey Franco DO  10/6/2023 9:45 AM

## 2023-10-09 ENCOUNTER — TELEPHONE (OUTPATIENT)
Dept: NEPHROLOGY | Facility: CLINIC | Age: 83
End: 2023-10-09

## 2023-10-09 ENCOUNTER — DOCUMENTATION (OUTPATIENT)
Dept: NEPHROLOGY | Facility: CLINIC | Age: 83
End: 2023-10-09

## 2023-10-09 LAB
EXT GLUCOSE BLD: 78
EXTERNAL ANION GAP: 10
EXTERNAL BUN: 8
EXTERNAL CALCIUM: 9.1
EXTERNAL CHLORIDE: 90
EXTERNAL CO2: 34
EXTERNAL CREATININE: 0.62
EXTERNAL EGFR: 88
EXTERNAL POTASSIUM: 3.9
EXTERNAL SODIUM: 134

## 2023-10-09 NOTE — TELEPHONE ENCOUNTER
Checking to see if contact was made for [address] [place] [address]. Still not scheduled to date. Kymberlynurys Yuli called me stating that she contacted [facility]. They said they could not schedule til February. So then they looked at more current  labs and said she did not need to be seen as per Karyle Oats her daughter. I called and left a message on the machine requesting a return call to discuss. Karyle Oats could not remember who she spoke to and there doesn't seem to be documentation. Waiting return call to update.

## 2023-10-10 ENCOUNTER — TELEPHONE (OUTPATIENT)
Dept: NEPHROLOGY | Facility: CLINIC | Age: 83
End: 2023-10-10

## 2023-10-10 NOTE — TELEPHONE ENCOUNTER
Patient has appointment in [address] [place] [address].  11-14-23 @ 2:30 with Namita Peterson

## 2023-10-10 NOTE — TELEPHONE ENCOUNTER
Called patient and left message of appointment in [facility] and if she needs anything she can call us also.

## 2023-10-11 ENCOUNTER — APPOINTMENT (OUTPATIENT)
Dept: LAB | Facility: HOSPITAL | Age: 83
End: 2023-10-11
Payer: COMMERCIAL

## 2023-10-11 DIAGNOSIS — Z95.2 HEART VALVE REPLACED BY TRANSPLANT: ICD-10-CM

## 2023-10-11 LAB
INR PPP: 1.54 (ref 0.84–1.19)
PROTHROMBIN TIME: 18.3 SECONDS (ref 11.6–14.5)

## 2023-10-11 PROCEDURE — 85610 PROTHROMBIN TIME: CPT

## 2023-10-11 PROCEDURE — 36415 COLL VENOUS BLD VENIPUNCTURE: CPT

## 2023-10-18 ENCOUNTER — APPOINTMENT (OUTPATIENT)
Dept: LAB | Facility: HOSPITAL | Age: 83
End: 2023-10-18
Payer: COMMERCIAL

## 2023-10-18 DIAGNOSIS — Z95.2 HEART VALVE REPLACED BY TRANSPLANT: Primary | ICD-10-CM

## 2023-10-18 LAB
INR PPP: 2.08 (ref 0.84–1.19)
PROTHROMBIN TIME: 23 SECONDS (ref 11.6–14.5)

## 2023-10-18 PROCEDURE — 36415 COLL VENOUS BLD VENIPUNCTURE: CPT

## 2023-10-18 PROCEDURE — 85610 PROTHROMBIN TIME: CPT

## 2023-10-25 ENCOUNTER — TRANSCRIBE ORDERS (OUTPATIENT)
Dept: LAB | Facility: HOSPITAL | Age: 83
End: 2023-10-25

## 2023-10-25 ENCOUNTER — APPOINTMENT (OUTPATIENT)
Dept: LAB | Facility: HOSPITAL | Age: 83
End: 2023-10-25
Payer: COMMERCIAL

## 2023-10-25 DIAGNOSIS — Z95.2 HEART VALVE REPLACED BY TRANSPLANT: Primary | ICD-10-CM

## 2023-10-25 DIAGNOSIS — Z95.2 HEART VALVE REPLACED BY TRANSPLANT: ICD-10-CM

## 2023-10-25 LAB
INR PPP: 2.38 (ref 0.84–1.19)
PROTHROMBIN TIME: 25.5 SECONDS (ref 11.6–14.5)

## 2023-10-25 PROCEDURE — 36415 COLL VENOUS BLD VENIPUNCTURE: CPT

## 2023-10-25 PROCEDURE — 85610 PROTHROMBIN TIME: CPT

## 2023-11-01 ENCOUNTER — APPOINTMENT (OUTPATIENT)
Dept: LAB | Facility: HOSPITAL | Age: 83
End: 2023-11-01
Payer: COMMERCIAL

## 2023-11-01 ENCOUNTER — TRANSCRIBE ORDERS (OUTPATIENT)
Dept: LAB | Facility: HOSPITAL | Age: 83
End: 2023-11-01

## 2023-11-01 DIAGNOSIS — Z95.2 HEART VALVE REPLACED BY TRANSPLANT: Primary | ICD-10-CM

## 2023-11-01 DIAGNOSIS — Z95.2 HEART VALVE REPLACED BY TRANSPLANT: ICD-10-CM

## 2023-11-01 LAB
INR PPP: 2.71 (ref 0.84–1.19)
PROTHROMBIN TIME: 28.2 SECONDS (ref 11.6–14.5)

## 2023-11-01 PROCEDURE — 85610 PROTHROMBIN TIME: CPT

## 2023-11-01 PROCEDURE — 36415 COLL VENOUS BLD VENIPUNCTURE: CPT

## 2023-11-08 ENCOUNTER — TRANSCRIBE ORDERS (OUTPATIENT)
Dept: LAB | Facility: HOSPITAL | Age: 83
End: 2023-11-08

## 2023-11-08 ENCOUNTER — APPOINTMENT (OUTPATIENT)
Dept: LAB | Facility: HOSPITAL | Age: 83
End: 2023-11-08
Payer: COMMERCIAL

## 2023-11-08 DIAGNOSIS — Z95.2 HEART VALVE REPLACED BY TRANSPLANT: ICD-10-CM

## 2023-11-08 DIAGNOSIS — Z95.2 HEART VALVE REPLACED BY TRANSPLANT: Primary | ICD-10-CM

## 2023-11-08 LAB
INR PPP: 1.84 (ref 0.84–1.19)
PROTHROMBIN TIME: 21 SECONDS (ref 11.6–14.5)

## 2023-11-08 PROCEDURE — 36415 COLL VENOUS BLD VENIPUNCTURE: CPT

## 2023-11-08 PROCEDURE — 85610 PROTHROMBIN TIME: CPT

## 2023-11-14 ENCOUNTER — TELEPHONE (OUTPATIENT)
Dept: NEPHROLOGY | Facility: CLINIC | Age: 83
End: 2023-11-14

## 2023-11-14 NOTE — TELEPHONE ENCOUNTER
Received call from pt daughter Dada Dumont to reschedule pt appointment for HFU with Rodolfo Spangler in [facility], pt needs [facility] , I advised there are no afternoon available appointments in the [facility] with any provider, and we will discuss with coordinating team to see where we can get pt scheduled, pt is added to wait list for HFU. Pt will need afternoon appointment to ensure pt will have enough time to get to appointment. Contact daughter to reschedule.

## 2023-11-15 ENCOUNTER — APPOINTMENT (OUTPATIENT)
Dept: LAB | Facility: HOSPITAL | Age: 83
End: 2023-11-15
Payer: COMMERCIAL

## 2023-11-15 ENCOUNTER — HOSPITAL ENCOUNTER (OUTPATIENT)
Dept: RADIOLOGY | Facility: HOSPITAL | Age: 83
Discharge: HOME | End: 2023-11-15
Payer: COMMERCIAL

## 2023-11-15 DIAGNOSIS — Z95.2 HEART VALVE REPLACED BY TRANSPLANT: ICD-10-CM

## 2023-11-15 DIAGNOSIS — M25.531 WRIST PAIN, RIGHT: ICD-10-CM

## 2023-11-15 LAB
INR PPP: 3.45 (ref 0.84–1.19)
PROTHROMBIN TIME: 34 SECONDS (ref 11.6–14.5)

## 2023-11-15 PROCEDURE — 73110 X-RAY EXAM OF WRIST: CPT

## 2023-11-15 PROCEDURE — 36415 COLL VENOUS BLD VENIPUNCTURE: CPT

## 2023-11-15 PROCEDURE — 85610 PROTHROMBIN TIME: CPT

## 2023-11-22 ENCOUNTER — APPOINTMENT (OUTPATIENT)
Dept: LAB | Facility: HOSPITAL | Age: 83
End: 2023-11-22
Payer: COMMERCIAL

## 2023-11-22 DIAGNOSIS — Z95.2 HEART VALVE REPLACED BY TRANSPLANT: ICD-10-CM

## 2023-11-22 LAB
INR PPP: 4.71 (ref 0.84–1.19)
PROTHROMBIN TIME: 43.2 SECONDS (ref 11.6–14.5)

## 2023-11-22 PROCEDURE — 36415 COLL VENOUS BLD VENIPUNCTURE: CPT

## 2023-11-22 PROCEDURE — 85610 PROTHROMBIN TIME: CPT

## 2023-11-28 ENCOUNTER — TELEPHONE (OUTPATIENT)
Dept: NEPHROLOGY | Facility: CLINIC | Age: 83
End: 2023-11-28

## 2023-11-28 NOTE — TELEPHONE ENCOUNTER
I called nursing home facility to schedule patients missed appointment on 11/14 but the person who handles the scheduling stepped out. I left phone number for the office for him to call back. Dr. Judie Vincent has an opening on 2/2 at 6 am in [facility].

## 2023-11-28 NOTE — TELEPHONE ENCOUNTER
Pt's daughter called back and pt is scheduled to see Dr. Judie Vincent in [facility] on 2/2/24.

## 2023-11-29 ENCOUNTER — APPOINTMENT (OUTPATIENT)
Dept: LAB | Facility: HOSPITAL | Age: 83
End: 2023-11-29
Payer: COMMERCIAL

## 2023-11-29 DIAGNOSIS — Z95.2 HEART VALVE REPLACED BY TRANSPLANT: Primary | ICD-10-CM

## 2023-11-29 LAB
INR PPP: 2.88 (ref 0.84–1.19)
PROTHROMBIN TIME: 29.5 SECONDS (ref 11.6–14.5)

## 2023-11-29 PROCEDURE — 36415 COLL VENOUS BLD VENIPUNCTURE: CPT

## 2023-11-29 PROCEDURE — 85610 PROTHROMBIN TIME: CPT

## 2023-12-04 ENCOUNTER — TELEPHONE (OUTPATIENT)
Dept: NEPHROLOGY | Facility: CLINIC | Age: 83
End: 2023-12-04

## 2023-12-05 ENCOUNTER — TELEPHONE (OUTPATIENT)
Dept: NEPHROLOGY | Facility: CLINIC | Age: 83
End: 2023-12-05

## 2023-12-05 NOTE — TELEPHONE ENCOUNTER
Pt daughter called back and pt is scheduled for 12/8 at 3 pm in the [facility] for hsp fu with Chestine Bevels

## 2023-12-06 ENCOUNTER — APPOINTMENT (OUTPATIENT)
Dept: LAB | Facility: HOSPITAL | Age: 83
End: 2023-12-06
Payer: COMMERCIAL

## 2023-12-06 DIAGNOSIS — Z95.2 HEART VALVE REPLACED BY TRANSPLANT: ICD-10-CM

## 2023-12-06 LAB
INR PPP: 3.29 (ref 0.84–1.19)
PROTHROMBIN TIME: 32.8 SECONDS (ref 11.6–14.5)

## 2023-12-06 PROCEDURE — 85610 PROTHROMBIN TIME: CPT

## 2023-12-06 PROCEDURE — 36415 COLL VENOUS BLD VENIPUNCTURE: CPT

## 2023-12-08 ENCOUNTER — TELEPHONE (OUTPATIENT)
Dept: NEPHROLOGY | Facility: HOSPITAL | Age: 83
End: 2023-12-08

## 2023-12-08 ENCOUNTER — OFFICE VISIT (OUTPATIENT)
Dept: NEPHROLOGY | Facility: HOSPITAL | Age: 83
End: 2023-12-08
Payer: COMMERCIAL

## 2023-12-08 VITALS
SYSTOLIC BLOOD PRESSURE: 102 MMHG | BODY MASS INDEX: 23.36 KG/M2 | WEIGHT: 119 LBS | DIASTOLIC BLOOD PRESSURE: 60 MMHG | HEIGHT: 60 IN | HEART RATE: 93 BPM

## 2023-12-08 DIAGNOSIS — E87.1 HYPONATREMIA: Primary | ICD-10-CM

## 2023-12-08 PROCEDURE — 99214 OFFICE O/P EST MOD 30 MIN: CPT | Performed by: INTERNAL MEDICINE

## 2023-12-08 NOTE — TELEPHONE ENCOUNTER
Called patient and left a voicemail asking if they would like to come in to the appointment for today at 2:30.

## 2023-12-08 NOTE — PROGRESS NOTES
OFFICE FOLLOW UP - Nephrology   [facility] DAYBREAK OF SAMMIE 80 y.o. female MRN: [id number]    Encounter: [id number]        ASSESSMENT & PLAN    Diagnoses and all orders for this visit:    Jennifer Davila is 80years old and has a history of mechanical aortic valve, history of seizures, has had multiple falls. Currently lives in [address] [place] [address] and is here because of hyponatremia    Hyponatremia  -     Osmolality-If this is regarding a toxic alcohol, STOP. Test is not routinely indicated. 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 She is here with her daughter. We will follow-up with her in 1 year, labs are stable  She had a recent fall her skilled nursing facility is addressing the hematoma erythema and edema is associated with this    HPI: Redia Felty is a 80 y.o. female who is here for follow-up    77-year-old female with a history of aortic valve replacement hematoma of the left shoulder epilepsy. Depression, osteoporosis 6. He presents for evaluation of hyponatremia, sodium was as low as 124. Her blood work in October showed her sodium was 134. Currently has no acute complaints, no fevers chills nausea vomiting.   She did have another recent fall where she had a arm laceration, she is on Coumadin, had some bleeding, now has some edema and erythema in the right hand    ROS:   Review of Systems   Constitutional: Negative. HENT: Negative. Eyes: Negative. Respiratory: Negative. Cardiovascular: Negative. Gastrointestinal: Negative. Endocrine: Negative. Genitourinary: Negative. Musculoskeletal: Negative. Allergic/Immunologic: Negative. Neurological: Negative. Hematological: Negative. Psychiatric/Behavioral: Negative. All other systems reviewed and are negative.       Allergies: Erythromycin, Shellfish-derived products - food allergy, Ephedrine, Iodinated contrast media, Latex, Nsaids, Povidone iodine, Salicylates, Shrimp (diagnostic) - food allergy, Sympathomimetics, Tiotropium, and Tositumomab    Medications:   Current Outpatient Medications:     acetaminophen (TYLENOL) 325 mg tablet, Take 650 mg by mouth every 8 (eight) hours as needed for mild pain, Disp: , Rfl:     albuterol (PROVENTIL HFA,VENTOLIN HFA) 90 mcg/act inhaler, Inhale 2 puffs every 6 (six) hours as needed for wheezing, Disp: , Rfl:     ascorbic acid (VITAMIN C) 500 MG tablet, Take 500 mg by mouth daily, Disp: , Rfl:     atorvastatin (LIPITOR) 80 mg tablet, Take 80 mg by mouth daily, Disp: , Rfl:     Calcium Carb-Cholecalciferol 600-10 MG-MCG TABS, Take 1 tablet by mouth every morning, Disp: , Rfl:     calcium carbonate (OS-JERARDO) 600 MG tablet, Take 600 mg by mouth daily, Disp: , Rfl:     clotrimazole (MYCELEX) 10 mg lia, 1 Lia, Disp: , Rfl:     denosumab (Prolia) 60 mg/mL, as directed Subcutaneous, Disp: , Rfl:     diltiazem (CARDIZEM CD) 180 mg 24 hr capsule, , Disp: , Rfl:     diltiazem (DILACOR XR) 180 MG 24 hr capsule, Take 180 mg by mouth daily, Disp: , Rfl:     esomeprazole (NexIUM) 40 MG capsule, Take 1 capsule by mouth daily, Disp: , Rfl:     Fluticasone-Salmeterol (Advair) 100-50 mcg/dose inhaler, Inhale 1 puff 2 (two) times a day Rinse mouth after use., Disp: , Rfl: fluticasone-umeclidinium-vilanterol (Trelegy Ellipta) 100-62.5-25 mcg/actuation inhaler, 1 puff every 24 hours, Disp: , Rfl:     furosemide (LASIX) 40 mg tablet, Take 40 mg by mouth daily, Disp: , Rfl:     guaiFENesin (MUCINEX) 600 mg 12 hr tablet, 1 TAB ORALLY EVERY 12 HOURS DX: COUGH, Disp: , Rfl:     lactase (LACTAID) 3,000 units tablet, Take 9,000 Units by mouth 3 (three) times a day as needed, Disp: , Rfl:     lamoTRIgine (LaMICtal) 100 mg tablet, Take 100 mg by mouth 2 (two) times a day, Disp: , Rfl:     levothyroxine 125 mcg tablet, Take 125 mcg by mouth daily, Disp: , Rfl:     Lidocaine 4 % PTCH, Apply 1 patch topically daily at bedtime On for 12 hrs, off for 12 hrs, Disp: , Rfl:     loperamide (IMODIUM) 2 mg capsule, , Disp: , Rfl:     magnesium hydroxide (MILK OF MAGNESIA) 400 mg/5 mL oral suspension, Take 30 mL by mouth, Disp: , Rfl:     montelukast (SINGULAIR) 10 mg tablet, Take 10 mg by mouth daily at bedtime, Disp: , Rfl:     Multiple Vitamin (multivitamin) tablet, Take 1 tablet by mouth daily, Disp: , Rfl:     omeprazole (PriLOSEC) 20 mg delayed release capsule, Take 20 mg by mouth daily, Disp: , Rfl:     oxybutynin (DITROPAN) 5 mg tablet, Take 5 mg by mouth 3 (three) times a day, Disp: , Rfl:     senna (SENOKOT) 8.6 MG tablet, Take 1 tablet by mouth daily as needed for constipation, Disp: , Rfl:     sertraline (ZOLOFT) 100 mg tablet, Take 100 mg by mouth daily, Disp: , Rfl:     sodium phosphate-biphosphate (FLEET) 7-19 g 118 mL enema, Insert 118 mL into the rectum, Disp: , Rfl:     Symbicort 160-4.5 MCG/ACT inhaler, Inhale Every 12 hours, Disp: , Rfl:     warfarin (COUMADIN) 2.5 mg tablet, Take by mouth daily, Disp: , Rfl:     Past Medical History:   Diagnosis Date    Anemia     Anxiety disorder, unspecified     Anxiety disorder, unspecified     Arthritis     Asthma     Cardiac arrest, cause unspecified (HCC)     Chronic obstructive pulmonary disease, unspecified (HCC)     Constipation     Essential (primary) hypertension     GERD (gastroesophageal reflux disease)     Hypo-osmolality and hyponatremia     Hypothyroidism     Nontraumatic hematoma of soft tissue     Other seizures (HCC)     Persistent atrial fibrillation (HCC)     Unspecified urinary incontinence      Past Surgical History:   Procedure Laterality Date    APPENDECTOMY      CARDIAC PACEMAKER PLACEMENT      FEMUR FRACTURE SURGERY       Family History   Family history unknown: Yes      reports that she has never smoked. She has never used smokeless tobacco. She reports that she does not currently use alcohol. She reports that she does not use drugs. OBJECTIVE:    Vitals:    12/08/23 1451   BP: 102/60   BP Location: Left arm   Patient Position: Sitting   Cuff Size: Standard   Pulse: 93   Weight: 54 kg (119 lb)   Height: 5' (1.524 m)        Body mass index is 23.24 kg/m². [unfilled]     Weight (last 2 days)       Date/Time Weight    12/08/23 1451 54 (119)               Physical exam:  Physical Exam  Vitals reviewed. Constitutional:       Appearance: Normal appearance. She is normal weight. HENT:      Head: Normocephalic and atraumatic. Right Ear: External ear normal.      Left Ear: External ear normal.      Nose: Nose normal.      Mouth/Throat:      Mouth: Mucous membranes are dry. Eyes:      Extraocular Movements: Extraocular movements intact. Pupils: Pupils are equal, round, and reactive to light. Cardiovascular:      Rate and Rhythm: Normal rate and regular rhythm. Pulses: Normal pulses. Pulmonary:      Effort: Pulmonary effort is normal.      Breath sounds: Normal breath sounds. Abdominal:      General: Abdomen is flat. Bowel sounds are normal.   Musculoskeletal:         General: Normal range of motion. Cervical back: Normal range of motion and neck supple. Skin:     General: Skin is warm and dry. Neurological:      General: No focal deficit present.       Mental Status: She is alert and oriented to person, place, and time. Mental status is at baseline. Psychiatric:         Mood and Affect: Mood normal.         Behavior: Behavior normal.         Thought Content: Thought content normal.         Judgment: Judgment normal.         Data Review:    Results for orders placed or performed in visit on 12/06/23   Protime-INR   Result Value Ref Range    Protime 32.8 (H) 11.6 - 14.5 seconds    INR 3.29 (H) 0.84 - 1.19             Portions of the record may have been created with voice recognition software. Occasional wrong word or "sound a like" substitutions may have occurred due to the inherent limitations of voice recognition software. Read the chart carefully and recognize, using context, where substitutions have occurred. If you have any questions, please contact the dictating provider.

## 2023-12-08 NOTE — PATIENT INSTRUCTIONS
Hyponatremia   WHAT YOU NEED TO KNOW:   Hyponatremia occurs when the amount of sodium (salt) in your blood is lower than normal. Sodium is an electrolyte (mineral) that helps your muscles, heart, and digestive system work properly. It helps control blood pressure and fluid balance. WHILE YOU ARE HERE:   Informed consent  is a legal document that explains the tests, treatments, or procedures that you may need. Informed consent means you understand what will be done and can make decisions about what you want. You give your permission when you sign the consent form. You can have someone sign this form for you if you are not able to sign it. You have the right to understand your medical care in words you know. Before you sign the consent form, understand the risks and benefits of what will be done. Make sure all your questions are answered. An IV  is a small tube placed in your vein that is used to give you medicine or liquids. Telemetry  is continuous monitoring of your heart rhythm. Sticky pads placed on your skin connect to an EKG machine that records your heart rhythm. Nutrition:  A dietitian may talk to you about foods you should eat to increase the amount of sodium in your body. The dietitian may also plan a diet for you if you have other diseases, such as kidney or liver disease. Liquids: Follow your healthcare provider's advice if you must limit the amount of liquid you drink. Healthcare providers may limit the amount of plain water you drink if you are retaining water. Plain water can lower the sodium in your blood and make your hyponatremia worse. You may be given liquids that have water, sugar, and salt, such as juices, milk, or sports drinks. Medicines:   Anticonvulsant medicine  is given to control seizures. Antinausea medicine  helps calm your stomach and prevents vomiting. Diuretics  help get rid of extra fluid in your body.  You may urinate more often when taking diuretics. Sodium-retaining medicines  help get rid of extra fluid in your body while sodium stays inside your body. Tests:   Blood tests  will be done to check the level of sodium in your blood. They may also be done to find the cause of your hyponatremia. Urine sodium  is a test that checks the level of sodium in your urine. A sample of your urine is collected and is sent to a lab for tests. A chest x-ray  is used to check your heart and lung function. Healthcare providers may use the x-ray to look for the cause of your hyponatremia. Treatment:  Dialysis may be needed if medicines cannot decrease extra water in your body and your kidneys are not working well. RISKS:   Treatment for hyponatremia may cause unpleasant side effects. Medicines may cause nausea, vomiting, low blood pressure, trouble breathing, fever, or a skin rash. Without treatment, hyponatremia may cause serious problems, such as brain swelling, heart problems, or a coma. These problems can be life-threatening. CARE AGREEMENT:   You have the right to help plan your care. Learn about your health condition and how it may be treated. Discuss treatment options with your healthcare providers to decide what care you want to receive. You always have the right to refuse treatment. © Copyright [employer] 2023 Information is for End User's use only and may not be sold, redistributed or otherwise used for commercial purposes. The above information is an  only. It is not intended as medical advice for individual conditions or treatments. Talk to your doctor, nurse or pharmacist before following any medical regimen to see if it is safe and effective for you.

## 2023-12-13 ENCOUNTER — APPOINTMENT (OUTPATIENT)
Dept: LAB | Facility: HOSPITAL | Age: 83
End: 2023-12-13
Attending: INTERNAL MEDICINE
Payer: COMMERCIAL

## 2023-12-13 ENCOUNTER — TELEPHONE (OUTPATIENT)
Dept: NEPHROLOGY | Facility: CLINIC | Age: 83
End: 2023-12-13

## 2023-12-13 DIAGNOSIS — E87.1 HYPONATREMIA: ICD-10-CM

## 2023-12-13 DIAGNOSIS — E87.1 HYPONATREMIA: Primary | ICD-10-CM

## 2023-12-13 DIAGNOSIS — Z95.2 HEART VALVE REPLACED BY TRANSPLANT: Primary | ICD-10-CM

## 2023-12-13 LAB
ANION GAP SERPL CALCULATED.3IONS-SCNC: 6 MMOL/L
BUN SERPL-MCNC: 15 MG/DL (ref 5–25)
CALCIUM SERPL-MCNC: 9.7 MG/DL (ref 8.4–10.2)
CHLORIDE SERPL-SCNC: 88 MMOL/L (ref 96–108)
CO2 SERPL-SCNC: 42 MMOL/L (ref 21–32)
CREAT SERPL-MCNC: 0.88 MG/DL (ref 0.6–1.3)
GFR SERPL CREATININE-BSD FRML MDRD: 60 ML/MIN/1.73SQ M
GLUCOSE P FAST SERPL-MCNC: 92 MG/DL (ref 65–99)
INR PPP: 3.72 (ref 0.84–1.19)
POTASSIUM SERPL-SCNC: 3.2 MMOL/L (ref 3.5–5.3)
PROTHROMBIN TIME: 36 SECONDS (ref 11.6–14.5)
SODIUM SERPL-SCNC: 136 MMOL/L (ref 135–147)
TSH SERPL DL<=0.05 MIU/L-ACNC: 58.3 UIU/ML (ref 0.45–4.5)
URATE SERPL-MCNC: 4.6 MG/DL (ref 2–7.5)

## 2023-12-13 PROCEDURE — 84443 ASSAY THYROID STIM HORMONE: CPT

## 2023-12-13 PROCEDURE — 84165 PROTEIN E-PHORESIS SERUM: CPT

## 2023-12-13 PROCEDURE — 80048 BASIC METABOLIC PNL TOTAL CA: CPT

## 2023-12-13 PROCEDURE — 85610 PROTHROMBIN TIME: CPT

## 2023-12-13 PROCEDURE — 36415 COLL VENOUS BLD VENIPUNCTURE: CPT

## 2023-12-13 PROCEDURE — 84550 ASSAY OF BLOOD/URIC ACID: CPT

## 2023-12-13 RX ORDER — POTASSIUM CHLORIDE 750 MG/1
10 TABLET, EXTENDED RELEASE ORAL DAILY
Qty: 90 TABLET | Refills: 3 | Status: SHIPPED | OUTPATIENT
Start: 2023-12-13

## 2023-12-13 NOTE — TELEPHONE ENCOUNTER
----- Message from Cruz Griffiths MD sent at 12/13/2023  5:16 PM EST -----  Dr Kiran Hudson -I sent you a lab work on the patient. I did address it this evening after I saw it. I left a note in the chart and forwarded it to you. Will defer further plans to you regarding this    MA team -can you please send the patient nursing home lab slips for BMP-fax number is [phone].  Please order for one week and for Dr Kiran Hudson    thanks

## 2023-12-13 NOTE — TELEPHONE ENCOUNTER
Renal short note. I received BMP on patient. K 3.2, bicarb elevated. Chart reviewed. Lasix held during hospitalization. The Lasix has been restarted post discharge to 40 mg daily. Patient was seen by our nephrologist Dr. Dung Garcia and I will CC him to this note but for now I asked the nursing home nurses to reduce Lasix to 20 mg daily from 40 mg daily and to review with their physicians there to increase back to 40 mg daily if needed.     I have also requested that the patient start potassium chloride supplement 10 mEq once a day  -I have sent this to the pharmacy per the nurses request    Our office will need to send lab work slips to complete in 1 week and the nursing team at the [facility] is aware    Fax number - [phone]  [facility] number [phone]

## 2023-12-15 LAB
ALBUMIN SERPL ELPH-MCNC: 3.95 G/DL (ref 3.2–5.1)
ALBUMIN SERPL ELPH-MCNC: 59.8 % (ref 48–70)
ALPHA1 GLOB SERPL ELPH-MCNC: 0.44 G/DL (ref 0.15–0.47)
ALPHA1 GLOB SERPL ELPH-MCNC: 6.7 % (ref 1.8–7)
ALPHA2 GLOB SERPL ELPH-MCNC: 0.73 G/DL (ref 0.42–1.04)
ALPHA2 GLOB SERPL ELPH-MCNC: 11.1 % (ref 5.9–14.9)
BETA GLOB ABNORMAL SERPL ELPH-MCNC: 0.45 G/DL (ref 0.31–0.57)
BETA1 GLOB SERPL ELPH-MCNC: 6.8 % (ref 4.7–7.7)
BETA2 GLOB SERPL ELPH-MCNC: 5.8 % (ref 3.1–7.9)
BETA2+GAMMA GLOB SERPL ELPH-MCNC: 0.38 G/DL (ref 0.2–0.58)
GAMMA GLOB ABNORMAL SERPL ELPH-MCNC: 0.65 G/DL (ref 0.4–1.66)
GAMMA GLOB SERPL ELPH-MCNC: 9.8 % (ref 6.9–22.3)
IGG/ALB SER: 1.49 {RATIO} (ref 1.1–1.8)
PROT PATTERN SERPL ELPH-IMP: NORMAL
PROT SERPL-MCNC: 6.6 G/DL (ref 6.4–8.2)

## 2023-12-15 PROCEDURE — 84165 PROTEIN E-PHORESIS SERUM: CPT | Performed by: STUDENT IN AN ORGANIZED HEALTH CARE EDUCATION/TRAINING PROGRAM

## 2023-12-18 ENCOUNTER — APPOINTMENT (EMERGENCY)
Dept: CT IMAGING | Facility: HOSPITAL | Age: 83
DRG: 184 | End: 2023-12-18
Payer: COMMERCIAL

## 2023-12-18 ENCOUNTER — HOSPITAL ENCOUNTER (INPATIENT)
Facility: HOSPITAL | Age: 83
LOS: 3 days | Discharge: HOME HEALTH | DRG: 184 | End: 2023-12-21
Attending: EMERGENCY MEDICINE | Admitting: HOSPITALIST
Payer: COMMERCIAL

## 2023-12-18 DIAGNOSIS — N39.0 UTI (URINARY TRACT INFECTION): ICD-10-CM

## 2023-12-18 DIAGNOSIS — R79.1 ELEVATED INR: ICD-10-CM

## 2023-12-18 DIAGNOSIS — S22.31XA FRACTURE OF ONE RIB, RIGHT SIDE, INITIAL ENCOUNTER FOR CLOSED FRACTURE: ICD-10-CM

## 2023-12-18 DIAGNOSIS — S20.20XA: ICD-10-CM

## 2023-12-18 DIAGNOSIS — R29.6 RECURRENT FALLS: Primary | ICD-10-CM

## 2023-12-18 DIAGNOSIS — R91.8 PULMONARY NODULES: ICD-10-CM

## 2023-12-18 DIAGNOSIS — D64.9 ANEMIA: ICD-10-CM

## 2023-12-18 DIAGNOSIS — I48.91 ATRIAL FIBRILLATION WITH RAPID VENTRICULAR RESPONSE (HCC): ICD-10-CM

## 2023-12-18 PROBLEM — R26.2 AMBULATORY DYSFUNCTION: Status: ACTIVE | Noted: 2023-12-18

## 2023-12-18 PROBLEM — S22.41XA CLOSED FRACTURE OF MULTIPLE RIBS OF RIGHT SIDE: Status: ACTIVE | Noted: 2023-12-18

## 2023-12-18 PROBLEM — E83.42 HYPOMAGNESEMIA: Status: ACTIVE | Noted: 2023-12-18

## 2023-12-18 LAB
2HR DELTA HS TROPONIN: 4 NG/L
ALBUMIN SERPL BCP-MCNC: 3.9 G/DL (ref 3.5–5)
ALP SERPL-CCNC: 93 U/L (ref 34–104)
ALT SERPL W P-5'-P-CCNC: 35 U/L (ref 7–52)
ANION GAP SERPL CALCULATED.3IONS-SCNC: 8 MMOL/L
APTT PPP: 53 SECONDS (ref 23–37)
AST SERPL W P-5'-P-CCNC: 59 U/L (ref 13–39)
BACTERIA UR QL AUTO: ABNORMAL /HPF
BASOPHILS # BLD AUTO: 0.04 THOUSANDS/ÂΜL (ref 0–0.1)
BASOPHILS NFR BLD AUTO: 0 % (ref 0–1)
BILIRUB SERPL-MCNC: 0.43 MG/DL (ref 0.2–1)
BILIRUB UR QL STRIP: NEGATIVE
BUN SERPL-MCNC: 19 MG/DL (ref 5–25)
CALCIUM SERPL-MCNC: 9.3 MG/DL (ref 8.4–10.2)
CARDIAC TROPONIN I PNL SERPL HS: 16 NG/L
CARDIAC TROPONIN I PNL SERPL HS: 20 NG/L
CHLORIDE SERPL-SCNC: 91 MMOL/L (ref 96–108)
CLARITY UR: CLEAR
CO2 SERPL-SCNC: 33 MMOL/L (ref 21–32)
COLOR UR: ABNORMAL
CREAT SERPL-MCNC: 1.16 MG/DL (ref 0.6–1.3)
EOSINOPHIL # BLD AUTO: 0.03 THOUSAND/ÂΜL (ref 0–0.61)
EOSINOPHIL NFR BLD AUTO: 0 % (ref 0–6)
ERYTHROCYTE [DISTWIDTH] IN BLOOD BY AUTOMATED COUNT: 16.6 % (ref 11.6–15.1)
FLUAV RNA RESP QL NAA+PROBE: NEGATIVE
FLUBV RNA RESP QL NAA+PROBE: NEGATIVE
GFR SERPL CREATININE-BSD FRML MDRD: 43 ML/MIN/1.73SQ M
GLUCOSE SERPL-MCNC: 75 MG/DL (ref 65–140)
GLUCOSE UR STRIP-MCNC: NEGATIVE MG/DL
HCT VFR BLD AUTO: 28.6 % (ref 34.8–46.1)
HGB BLD-MCNC: 8.7 G/DL (ref 11.5–15.4)
HGB UR QL STRIP.AUTO: ABNORMAL
IMM GRANULOCYTES # BLD AUTO: 0.14 THOUSAND/UL (ref 0–0.2)
IMM GRANULOCYTES NFR BLD AUTO: 1 % (ref 0–2)
INR PPP: 5.82 (ref 0.84–1.19)
KETONES UR STRIP-MCNC: NEGATIVE MG/DL
LEUKOCYTE ESTERASE UR QL STRIP: ABNORMAL
LYMPHOCYTES # BLD AUTO: 0.78 THOUSANDS/ÂΜL (ref 0.6–4.47)
LYMPHOCYTES NFR BLD AUTO: 7 % (ref 14–44)
MAGNESIUM SERPL-MCNC: 1.4 MG/DL (ref 1.9–2.7)
MCH RBC QN AUTO: 28.6 PG (ref 26.8–34.3)
MCHC RBC AUTO-ENTMCNC: 30.4 G/DL (ref 31.4–37.4)
MCV RBC AUTO: 94 FL (ref 82–98)
MONOCYTES # BLD AUTO: 0.91 THOUSAND/ÂΜL (ref 0.17–1.22)
MONOCYTES NFR BLD AUTO: 9 % (ref 4–12)
NEUTROPHILS # BLD AUTO: 8.67 THOUSANDS/ÂΜL (ref 1.85–7.62)
NEUTS SEG NFR BLD AUTO: 83 % (ref 43–75)
NITRITE UR QL STRIP: NEGATIVE
NON-SQ EPI CELLS URNS QL MICRO: ABNORMAL /HPF
NRBC BLD AUTO-RTO: 0 /100 WBCS
OTHER STN SPEC: ABNORMAL
PH UR STRIP.AUTO: 8 [PH]
PLATELET # BLD AUTO: 263 THOUSANDS/UL (ref 149–390)
PMV BLD AUTO: 9.3 FL (ref 8.9–12.7)
POTASSIUM SERPL-SCNC: 3.9 MMOL/L (ref 3.5–5.3)
PROT SERPL-MCNC: 6.4 G/DL (ref 6.4–8.4)
PROT UR STRIP-MCNC: ABNORMAL MG/DL
PROTHROMBIN TIME: 52.7 SECONDS (ref 11.6–14.5)
RBC # BLD AUTO: 3.04 MILLION/UL (ref 3.81–5.12)
RBC #/AREA URNS AUTO: ABNORMAL /HPF
RSV RNA RESP QL NAA+PROBE: NEGATIVE
SARS-COV-2 RNA RESP QL NAA+PROBE: NEGATIVE
SODIUM SERPL-SCNC: 132 MMOL/L (ref 135–147)
SP GR UR STRIP.AUTO: <1.005 (ref 1–1.03)
UROBILINOGEN UR STRIP-ACNC: <2 MG/DL
WBC # BLD AUTO: 10.57 THOUSAND/UL (ref 4.31–10.16)
WBC #/AREA URNS AUTO: ABNORMAL /HPF

## 2023-12-18 PROCEDURE — 85730 THROMBOPLASTIN TIME PARTIAL: CPT | Performed by: EMERGENCY MEDICINE

## 2023-12-18 PROCEDURE — 99285 EMERGENCY DEPT VISIT HI MDM: CPT

## 2023-12-18 PROCEDURE — 96365 THER/PROPH/DIAG IV INF INIT: CPT

## 2023-12-18 PROCEDURE — 74177 CT ABD & PELVIS W/CONTRAST: CPT

## 2023-12-18 PROCEDURE — 84484 ASSAY OF TROPONIN QUANT: CPT | Performed by: EMERGENCY MEDICINE

## 2023-12-18 PROCEDURE — 87077 CULTURE AEROBIC IDENTIFY: CPT | Performed by: EMERGENCY MEDICINE

## 2023-12-18 PROCEDURE — 81001 URINALYSIS AUTO W/SCOPE: CPT | Performed by: EMERGENCY MEDICINE

## 2023-12-18 PROCEDURE — 87086 URINE CULTURE/COLONY COUNT: CPT | Performed by: EMERGENCY MEDICINE

## 2023-12-18 PROCEDURE — 72125 CT NECK SPINE W/O DYE: CPT

## 2023-12-18 PROCEDURE — 0241U HB NFCT DS VIR RESP RNA 4 TRGT: CPT | Performed by: EMERGENCY MEDICINE

## 2023-12-18 PROCEDURE — 96361 HYDRATE IV INFUSION ADD-ON: CPT

## 2023-12-18 PROCEDURE — 71260 CT THORAX DX C+: CPT

## 2023-12-18 PROCEDURE — 85025 COMPLETE CBC W/AUTO DIFF WBC: CPT | Performed by: EMERGENCY MEDICINE

## 2023-12-18 PROCEDURE — 70450 CT HEAD/BRAIN W/O DYE: CPT

## 2023-12-18 PROCEDURE — 99223 1ST HOSP IP/OBS HIGH 75: CPT | Performed by: INTERNAL MEDICINE

## 2023-12-18 PROCEDURE — 83735 ASSAY OF MAGNESIUM: CPT | Performed by: EMERGENCY MEDICINE

## 2023-12-18 PROCEDURE — 96367 TX/PROPH/DG ADDL SEQ IV INF: CPT

## 2023-12-18 PROCEDURE — 85610 PROTHROMBIN TIME: CPT | Performed by: EMERGENCY MEDICINE

## 2023-12-18 PROCEDURE — 87186 SC STD MICRODIL/AGAR DIL: CPT | Performed by: EMERGENCY MEDICINE

## 2023-12-18 PROCEDURE — 36415 COLL VENOUS BLD VENIPUNCTURE: CPT | Performed by: EMERGENCY MEDICINE

## 2023-12-18 PROCEDURE — 80053 COMPREHEN METABOLIC PANEL: CPT | Performed by: EMERGENCY MEDICINE

## 2023-12-18 PROCEDURE — 93005 ELECTROCARDIOGRAM TRACING: CPT

## 2023-12-18 PROCEDURE — 96375 TX/PRO/DX INJ NEW DRUG ADDON: CPT

## 2023-12-18 PROCEDURE — 99291 CRITICAL CARE FIRST HOUR: CPT | Performed by: EMERGENCY MEDICINE

## 2023-12-18 RX ORDER — ALBUTEROL SULFATE 90 UG/1
2 AEROSOL, METERED RESPIRATORY (INHALATION) EVERY 6 HOURS PRN
Status: DISCONTINUED | OUTPATIENT
Start: 2023-12-18 | End: 2023-12-21 | Stop reason: HOSPADM

## 2023-12-18 RX ORDER — SENNOSIDES 8.6 MG
1 TABLET ORAL DAILY PRN
Status: DISCONTINUED | OUTPATIENT
Start: 2023-12-18 | End: 2023-12-21 | Stop reason: HOSPADM

## 2023-12-18 RX ORDER — DIPHENHYDRAMINE HYDROCHLORIDE 50 MG/ML
50 INJECTION INTRAMUSCULAR; INTRAVENOUS ONCE
Status: COMPLETED | OUTPATIENT
Start: 2023-12-18 | End: 2023-12-18

## 2023-12-18 RX ORDER — LANOLIN ALCOHOL/MO/W.PET/CERES
1 CREAM (GRAM) TOPICAL
Status: DISCONTINUED | OUTPATIENT
Start: 2023-12-19 | End: 2023-12-21 | Stop reason: HOSPADM

## 2023-12-18 RX ORDER — ACETAMINOPHEN 325 MG/1
650 TABLET ORAL EVERY 6 HOURS PRN
Status: DISCONTINUED | OUTPATIENT
Start: 2023-12-18 | End: 2023-12-21 | Stop reason: HOSPADM

## 2023-12-18 RX ORDER — POTASSIUM CHLORIDE 750 MG/1
10 TABLET, EXTENDED RELEASE ORAL DAILY
Status: DISCONTINUED | OUTPATIENT
Start: 2023-12-19 | End: 2023-12-21 | Stop reason: HOSPADM

## 2023-12-18 RX ORDER — SULFAMETHOXAZOLE AND TRIMETHOPRIM 800; 160 MG/1; MG/1
1 TABLET ORAL EVERY 12 HOURS SCHEDULED
COMMUNITY
Start: 2023-12-14 | End: 2023-12-21

## 2023-12-18 RX ORDER — CEFTRIAXONE 1 G/50ML
1000 INJECTION, SOLUTION INTRAVENOUS EVERY 24 HOURS
Status: DISCONTINUED | OUTPATIENT
Start: 2023-12-19 | End: 2023-12-21 | Stop reason: HOSPADM

## 2023-12-18 RX ORDER — LIDOCAINE 50 MG/G
1 PATCH TOPICAL DAILY
Status: DISCONTINUED | OUTPATIENT
Start: 2023-12-18 | End: 2023-12-21 | Stop reason: HOSPADM

## 2023-12-18 RX ORDER — LEVOTHYROXINE SODIUM 0.07 MG/1
75 TABLET ORAL DAILY
Status: DISCONTINUED | OUTPATIENT
Start: 2023-12-19 | End: 2023-12-21 | Stop reason: HOSPADM

## 2023-12-18 RX ORDER — BISACODYL 10 MG
10 SUPPOSITORY, RECTAL RECTAL DAILY PRN
COMMUNITY

## 2023-12-18 RX ORDER — ATORVASTATIN CALCIUM 40 MG/1
80 TABLET, FILM COATED ORAL DAILY
Status: DISCONTINUED | OUTPATIENT
Start: 2023-12-19 | End: 2023-12-21 | Stop reason: HOSPADM

## 2023-12-18 RX ORDER — OXYBUTYNIN CHLORIDE 5 MG/1
5 TABLET ORAL 3 TIMES DAILY
Status: DISCONTINUED | OUTPATIENT
Start: 2023-12-18 | End: 2023-12-21 | Stop reason: HOSPADM

## 2023-12-18 RX ORDER — LAMOTRIGINE 100 MG/1
100 TABLET ORAL 2 TIMES DAILY
Status: DISCONTINUED | OUTPATIENT
Start: 2023-12-18 | End: 2023-12-21 | Stop reason: HOSPADM

## 2023-12-18 RX ORDER — HYDROMORPHONE HCL IN WATER/PF 6 MG/30 ML
0.2 PATIENT CONTROLLED ANALGESIA SYRINGE INTRAVENOUS ONCE
Status: COMPLETED | OUTPATIENT
Start: 2023-12-18 | End: 2023-12-18

## 2023-12-18 RX ORDER — MONTELUKAST SODIUM 10 MG/1
10 TABLET ORAL
Status: DISCONTINUED | OUTPATIENT
Start: 2023-12-18 | End: 2023-12-21 | Stop reason: HOSPADM

## 2023-12-18 RX ORDER — PANTOPRAZOLE SODIUM 40 MG/1
40 TABLET, DELAYED RELEASE ORAL
Status: DISCONTINUED | OUTPATIENT
Start: 2023-12-19 | End: 2023-12-21 | Stop reason: HOSPADM

## 2023-12-18 RX ORDER — BISACODYL 10 MG
10 SUPPOSITORY, RECTAL RECTAL DAILY PRN
Status: DISCONTINUED | OUTPATIENT
Start: 2023-12-18 | End: 2023-12-21

## 2023-12-18 RX ORDER — OXYCODONE HYDROCHLORIDE 5 MG/1
5 TABLET ORAL EVERY 6 HOURS PRN
Status: DISCONTINUED | OUTPATIENT
Start: 2023-12-18 | End: 2023-12-21 | Stop reason: HOSPADM

## 2023-12-18 RX ORDER — ASCORBIC ACID 500 MG
500 TABLET ORAL DAILY
Status: DISCONTINUED | OUTPATIENT
Start: 2023-12-19 | End: 2023-12-21 | Stop reason: HOSPADM

## 2023-12-18 RX ORDER — MAGNESIUM SULFATE HEPTAHYDRATE 40 MG/ML
2 INJECTION, SOLUTION INTRAVENOUS ONCE
Status: COMPLETED | OUTPATIENT
Start: 2023-12-18 | End: 2023-12-18

## 2023-12-18 RX ORDER — METOPROLOL TARTRATE 1 MG/ML
5 INJECTION, SOLUTION INTRAVENOUS ONCE
Status: COMPLETED | OUTPATIENT
Start: 2023-12-18 | End: 2023-12-18

## 2023-12-18 RX ORDER — FLUTICASONE FUROATE AND VILANTEROL 100; 25 UG/1; UG/1
1 POWDER RESPIRATORY (INHALATION) DAILY
Status: DISCONTINUED | OUTPATIENT
Start: 2023-12-19 | End: 2023-12-21 | Stop reason: HOSPADM

## 2023-12-18 RX ORDER — DILTIAZEM HYDROCHLORIDE 180 MG/1
180 CAPSULE, COATED, EXTENDED RELEASE ORAL DAILY
Status: DISCONTINUED | OUTPATIENT
Start: 2023-12-19 | End: 2023-12-21 | Stop reason: HOSPADM

## 2023-12-18 RX ORDER — CEFTRIAXONE 1 G/50ML
1000 INJECTION, SOLUTION INTRAVENOUS ONCE
Status: COMPLETED | OUTPATIENT
Start: 2023-12-18 | End: 2023-12-18

## 2023-12-18 RX ORDER — SERTRALINE HYDROCHLORIDE 100 MG/1
100 TABLET, FILM COATED ORAL
Status: DISCONTINUED | OUTPATIENT
Start: 2023-12-18 | End: 2023-12-21 | Stop reason: HOSPADM

## 2023-12-18 RX ADMIN — DIPHENHYDRAMINE HYDROCHLORIDE 50 MG: 50 INJECTION, SOLUTION INTRAMUSCULAR; INTRAVENOUS at 17:11

## 2023-12-18 RX ADMIN — CEFTRIAXONE 1000 MG: 1 INJECTION, SOLUTION INTRAVENOUS at 19:55

## 2023-12-18 RX ADMIN — METOPROLOL TARTRATE 5 MG: 5 INJECTION INTRAVENOUS at 19:55

## 2023-12-18 RX ADMIN — SODIUM CHLORIDE 500 ML: 0.9 INJECTION, SOLUTION INTRAVENOUS at 17:15

## 2023-12-18 RX ADMIN — OXYBUTYNIN CHLORIDE 5 MG: 5 TABLET ORAL at 23:42

## 2023-12-18 RX ADMIN — LIDOCAINE 1 PATCH: 700 PATCH TOPICAL at 23:42

## 2023-12-18 RX ADMIN — MONTELUKAST 10 MG: 10 TABLET, FILM COATED ORAL at 23:42

## 2023-12-18 RX ADMIN — IOHEXOL 85 ML: 350 INJECTION, SOLUTION INTRAVENOUS at 17:22

## 2023-12-18 RX ADMIN — HYDROMORPHONE HYDROCHLORIDE 0.2 MG: 0.2 INJECTION, SOLUTION INTRAMUSCULAR; INTRAVENOUS; SUBCUTANEOUS at 20:33

## 2023-12-18 RX ADMIN — MAGNESIUM SULFATE HEPTAHYDRATE 2 G: 2 INJECTION, SOLUTION INTRAVENOUS at 19:11

## 2023-12-18 RX ADMIN — LAMOTRIGINE 100 MG: 100 TABLET ORAL at 23:42

## 2023-12-18 RX ADMIN — SERTRALINE HYDROCHLORIDE 100 MG: 50 TABLET ORAL at 23:42

## 2023-12-19 LAB
ANION GAP SERPL CALCULATED.3IONS-SCNC: 7 MMOL/L
APTT PPP: 57 SECONDS (ref 23–37)
BASOPHILS # BLD AUTO: 0.05 THOUSANDS/ÂΜL (ref 0–0.1)
BASOPHILS NFR BLD AUTO: 1 % (ref 0–1)
BUN SERPL-MCNC: 12 MG/DL (ref 5–25)
CALCIUM SERPL-MCNC: 9.2 MG/DL (ref 8.4–10.2)
CHLORIDE SERPL-SCNC: 94 MMOL/L (ref 96–108)
CO2 SERPL-SCNC: 33 MMOL/L (ref 21–32)
CREAT SERPL-MCNC: 0.89 MG/DL (ref 0.6–1.3)
EOSINOPHIL # BLD AUTO: 0.15 THOUSAND/ÂΜL (ref 0–0.61)
EOSINOPHIL NFR BLD AUTO: 2 % (ref 0–6)
ERYTHROCYTE [DISTWIDTH] IN BLOOD BY AUTOMATED COUNT: 16.8 % (ref 11.6–15.1)
GFR SERPL CREATININE-BSD FRML MDRD: 60 ML/MIN/1.73SQ M
GLUCOSE SERPL-MCNC: 78 MG/DL (ref 65–140)
HCT VFR BLD AUTO: 31.6 % (ref 34.8–46.1)
HGB BLD-MCNC: 10.2 G/DL (ref 11.5–15.4)
IMM GRANULOCYTES # BLD AUTO: 0.17 THOUSAND/UL (ref 0–0.2)
IMM GRANULOCYTES NFR BLD AUTO: 2 % (ref 0–2)
INR PPP: 4.77 (ref 0.84–1.19)
LYMPHOCYTES # BLD AUTO: 0.73 THOUSANDS/ÂΜL (ref 0.6–4.47)
LYMPHOCYTES NFR BLD AUTO: 7 % (ref 14–44)
MAGNESIUM SERPL-MCNC: 2 MG/DL (ref 1.9–2.7)
MCH RBC QN AUTO: 29.2 PG (ref 26.8–34.3)
MCHC RBC AUTO-ENTMCNC: 32.3 G/DL (ref 31.4–37.4)
MCV RBC AUTO: 91 FL (ref 82–98)
MONOCYTES # BLD AUTO: 0.77 THOUSAND/ÂΜL (ref 0.17–1.22)
MONOCYTES NFR BLD AUTO: 8 % (ref 4–12)
NEUTROPHILS # BLD AUTO: 8.44 THOUSANDS/ÂΜL (ref 1.85–7.62)
NEUTS SEG NFR BLD AUTO: 80 % (ref 43–75)
NRBC BLD AUTO-RTO: 0 /100 WBCS
PLATELET # BLD AUTO: 279 THOUSANDS/UL (ref 149–390)
PMV BLD AUTO: 8.8 FL (ref 8.9–12.7)
POTASSIUM SERPL-SCNC: 4.2 MMOL/L (ref 3.5–5.3)
PROTHROMBIN TIME: 45.2 SECONDS (ref 11.6–14.5)
RBC # BLD AUTO: 3.49 MILLION/UL (ref 3.81–5.12)
SODIUM SERPL-SCNC: 134 MMOL/L (ref 135–147)
WBC # BLD AUTO: 10.31 THOUSAND/UL (ref 4.31–10.16)

## 2023-12-19 PROCEDURE — 94669 MECHANICAL CHEST WALL OSCILL: CPT

## 2023-12-19 PROCEDURE — 87081 CULTURE SCREEN ONLY: CPT | Performed by: INTERNAL MEDICINE

## 2023-12-19 PROCEDURE — 36415 COLL VENOUS BLD VENIPUNCTURE: CPT | Performed by: INTERNAL MEDICINE

## 2023-12-19 PROCEDURE — 85730 THROMBOPLASTIN TIME PARTIAL: CPT | Performed by: INTERNAL MEDICINE

## 2023-12-19 PROCEDURE — 83735 ASSAY OF MAGNESIUM: CPT | Performed by: INTERNAL MEDICINE

## 2023-12-19 PROCEDURE — 94664 DEMO&/EVAL PT USE INHALER: CPT

## 2023-12-19 PROCEDURE — 85025 COMPLETE CBC W/AUTO DIFF WBC: CPT | Performed by: INTERNAL MEDICINE

## 2023-12-19 PROCEDURE — 80048 BASIC METABOLIC PNL TOTAL CA: CPT | Performed by: INTERNAL MEDICINE

## 2023-12-19 PROCEDURE — 99232 SBSQ HOSP IP/OBS MODERATE 35: CPT | Performed by: HOSPITALIST

## 2023-12-19 PROCEDURE — 85610 PROTHROMBIN TIME: CPT | Performed by: INTERNAL MEDICINE

## 2023-12-19 RX ADMIN — UMECLIDINIUM 1 PUFF: 62.5 AEROSOL, POWDER ORAL at 10:15

## 2023-12-19 RX ADMIN — ATORVASTATIN CALCIUM 80 MG: 40 TABLET, FILM COATED ORAL at 08:22

## 2023-12-19 RX ADMIN — CEFTRIAXONE 1000 MG: 1 INJECTION, SOLUTION INTRAVENOUS at 20:35

## 2023-12-19 RX ADMIN — Medication 1 TABLET: at 08:23

## 2023-12-19 RX ADMIN — OXYBUTYNIN CHLORIDE 5 MG: 5 TABLET ORAL at 21:15

## 2023-12-19 RX ADMIN — LIDOCAINE 1 PATCH: 700 PATCH TOPICAL at 08:32

## 2023-12-19 RX ADMIN — LEVOTHYROXINE SODIUM 75 MCG: 75 TABLET ORAL at 06:10

## 2023-12-19 RX ADMIN — LAMOTRIGINE 100 MG: 100 TABLET ORAL at 17:29

## 2023-12-19 RX ADMIN — POTASSIUM CHLORIDE 10 MEQ: 750 TABLET, EXTENDED RELEASE ORAL at 08:22

## 2023-12-19 RX ADMIN — PANTOPRAZOLE SODIUM 40 MG: 40 TABLET, DELAYED RELEASE ORAL at 06:10

## 2023-12-19 RX ADMIN — LAMOTRIGINE 100 MG: 100 TABLET ORAL at 08:22

## 2023-12-19 RX ADMIN — FLUTICASONE FUROATE AND VILANTEROL TRIFENATATE 1 PUFF: 100; 25 POWDER RESPIRATORY (INHALATION) at 10:15

## 2023-12-19 RX ADMIN — MONTELUKAST 10 MG: 10 TABLET, FILM COATED ORAL at 21:15

## 2023-12-19 RX ADMIN — OXYCODONE HYDROCHLORIDE AND ACETAMINOPHEN 500 MG: 500 TABLET ORAL at 14:37

## 2023-12-19 RX ADMIN — OXYBUTYNIN CHLORIDE 5 MG: 5 TABLET ORAL at 17:29

## 2023-12-19 RX ADMIN — SERTRALINE HYDROCHLORIDE 100 MG: 50 TABLET ORAL at 21:15

## 2023-12-19 RX ADMIN — B-COMPLEX W/ C & FOLIC ACID TAB 1 TABLET: TAB at 08:23

## 2023-12-19 RX ADMIN — OXYBUTYNIN CHLORIDE 5 MG: 5 TABLET ORAL at 08:22

## 2023-12-19 NOTE — ASSESSMENT & PLAN NOTE
On Coumadin due to history of AVR and Afib   INR 5.82   INR goal 2.5-3.5  Home regimen: coumadin 2.5 mg daily 6 days per week. 3.75 mg on Saturdays.   Hold Coumadin, remains supratherapeutic  Recheck INR daily

## 2023-12-19 NOTE — ASSESSMENT & PLAN NOTE
Presents from facility after sliding out of her chair onto the right side. Pain to right lower rib region. Denies hitting head or having LOC. Fall unwitnessed.   Chronic ambulatory difficulty.   Admission at [facility] under trauma service in September 2023 after mechanical fall with traumatic hematoma of her left shoulder.  [facility]   Worsening weakness past couple of days in the setting of UTI   CT head and CT cervical spine negative   CT chest abdomen pelvis   Acute fractures of right posterior and posterolateral 10th rib with associated edema in the costochondral musculature. No pneumothorax, pleural effusion, or pulmonary contusion.   Large left inguinal hernia containing a long segment of descending colon and proximal sigmoid, without evidence of obstruction or incarceration.   Fall precautions   Consult PT/OT and CM

## 2023-12-19 NOTE — ASSESSMENT & PLAN NOTE
Diagnosed with UTI outpatient. Prescribed bactrim 12/14-12/24.   Urine sample (12/18)  UA: large amount of leuks and negative nitrite   Micro: Large amount of wbc and bacteria   Will transition to IV Ceftriaxone   Urine culture pending

## 2023-12-19 NOTE — ASSESSMENT & PLAN NOTE
Noted to be in Afib with RVR (HR into the 120s)  Home regimen   Diltiazem 180 mg daily   Coumadin (Hold to supratherapeutic INR)  Mag 1.4/Potassium 3.9   Replete magnesium.   IV lopressor 5 mg prn for HR > 120 sustained.   Consider adding metoprolol tartrate 25 mg bid to home medication list.   Telemetry   Cardiology consult

## 2023-12-19 NOTE — PLAN OF CARE
Problem: Prexisting or High Potential for Compromised Skin Integrity  Goal: Skin integrity is maintained or improved  Description: INTERVENTIONS:  - Identify patients at risk for skin breakdown  - Assess and monitor skin integrity  - Assess and monitor nutrition and hydration status  - Monitor labs   - Assess for incontinence   - Turn and reposition patient  - Assist with mobility/ambulation  - Relieve pressure over bony prominences  - Avoid friction and shearing  - Provide appropriate hygiene as needed including keeping skin clean and dry  - Evaluate need for skin moisturizer/barrier cream  - Collaborate with interdisciplinary team   - Patient/family teaching  - Consider wound care consult   Outcome: Progressing     Problem: Potential for Falls  Goal: Patient will remain free of falls  Description: INTERVENTIONS:  - Educate patient/family on patient safety including physical limitations  - Instruct patient to call for assistance with activity   - Consult OT/PT to assist with strengthening/mobility   - Keep Call bell within reach  - Keep bed low and locked with side rails adjusted as appropriate  - Keep care items and personal belongings within reach  - Initiate and maintain comfort rounds  - Make Fall Risk Sign visible to staff  - Offer Toileting every 2 Hours, in advance of need  - Initiate/Maintain 2alarm  - Obtain necessary fall risk management equipment: 2  - Apply yellow socks and bracelet for high fall risk patients  - Consider moving patient to room near nurses station  Outcome: Progressing

## 2023-12-19 NOTE — ED PROVIDER NOTES
Emergency Department Trauma Note  Apryl Casas 83 y.o. female MRN: [id number]  Unit/Bed#: ED TR13/TR13B Encounter: [id number]      Trauma Alert: Trauma Acuity: Trauma Evaluation  Model of Arrival: Mode of Arrival: BLS via    Trauma Team: Current Providers  Attending Provider: Bekah Greenwood DO  Attending Provider: Gracia Kumar MD  Registered Nurse: Jolie Winter RN  Advanced Practitioner: Geneva Chappell PA-C  Consultants:     None      History of Present Illness     Chief Complaint:   Chief Complaint   Patient presents with    Fall     Patient presents to the ED via EMS and fell while trying to put depends on and fell, denies head strike and c/o right sided back pain, reports hematoma to area      HPI:  Apryl Casas is a 83 y.o. female who presents with fall.  Mechanism:Details of Incident: fell trying to put on depends today, c/o pain in right side of back Injury Date: 12/18/23        83-year-old female with past history of atrial fibrillation on Coumadin, anemia, hypothyroidism, anxiety disorder, seizures, hypertension, COPD, GERD, constipation, cardiac arrest, asthma, arthritis, presents to the ED after a fall.  Patient lives with other family members.  Earlier today patient was trying to get out of her chair and lost her balance and slid down on her right side.  Patient now complains of bruising to the posterior aspect of right lower rib region.  Patient denies any trauma to head or neck.  Patient denies any LOC.  Fall was unwitnessed at home.  Patient states that she is currently on an antibiotic for UTI.  Patient has had recurrent falls over the past few weeks.  Patient sustained a scalp laceration from her last fall about 2 weeks ago.  Patient was seen at a different hospital during that time.  Trauma alert activated from the field as patient is an elderly female on Coumadin who fell.      History provided by:  Patient and EMS personnel  Fall  Associated symptoms: back pain    Associated  symptoms: no abdominal pain, no chest pain, no seizures and no vomiting      Review of Systems   Constitutional:  Negative for chills and fever.   HENT:  Negative for ear pain and sore throat.    Eyes:  Negative for pain and visual disturbance.   Respiratory:  Negative for cough and shortness of breath.    Cardiovascular:  Negative for chest pain and palpitations.   Gastrointestinal:  Negative for abdominal pain and vomiting.   Genitourinary:  Negative for dysuria and hematuria.   Musculoskeletal:  Positive for back pain. Negative for arthralgias.   Skin:  Negative for color change and rash.   Neurological:  Negative for seizures and syncope.   All other systems reviewed and are negative.      Historical Information     Immunizations:   Immunization History   Administered Date(s) Administered    COVID-19 PFIZER VACCINE 0.3 ML IM 02/08/2021, 03/01/2021, 09/02/2021, 05/25/2022    COVID-19 Pfizer Vac BIVALENT Luis-sucrose 12 Yr+ IM 10/17/2022    Influenza Split High Dose Preservative Free IM 10/17/2022    Tdap 08/02/2022       Past Medical History:   Diagnosis Date    Anemia     Anxiety disorder, unspecified     Anxiety disorder, unspecified     Arthritis     Asthma     Cardiac arrest, cause unspecified (HCC)     Chronic obstructive pulmonary disease, unspecified (HCC)     Constipation     Essential (primary) hypertension     GERD (gastroesophageal reflux disease)     Hypo-osmolality and hyponatremia     Hypothyroidism     Nontraumatic hematoma of soft tissue     Other seizures (HCC)     Persistent atrial fibrillation (HCC)     Unspecified urinary incontinence        Family History   Family history unknown: Yes     Past Surgical History:   Procedure Laterality Date    APPENDECTOMY      CARDIAC PACEMAKER PLACEMENT      FEMUR FRACTURE SURGERY       Social History     Tobacco Use    Smoking status: Never    Smokeless tobacco: Never   Vaping Use    Vaping status: Never Used   Substance Use Topics    Alcohol use: Not  Currently    Drug use: Never     E-Cigarette/Vaping    E-Cigarette Use Never User      E-Cigarette/Vaping Substances       Family History: non-contributory    Meds/Allergies   Prior to Admission Medications   Prescriptions Last Dose Informant Patient Reported? Taking?   Calcium Carb-Cholecalciferol 600-10 MG-MCG TABS   Yes No   Sig: Take 1 tablet by mouth every morning   Fluticasone-Salmeterol (Advair) 100-50 mcg/dose inhaler   Yes No   Sig: Inhale 1 puff 2 (two) times a day Rinse mouth after use.   Lidocaine 4 % PTCH   Yes No   Sig: Apply 1 patch topically daily at bedtime On for 12 hrs, off for 12 hrs   Multiple Vitamin (multivitamin) tablet   Yes No   Sig: Take 1 tablet by mouth daily   Symbicort 160-4.5 MCG/ACT inhaler   Yes No   Sig: Inhale Every 12 hours   acetaminophen (TYLENOL) 325 mg tablet   Yes No   Sig: Take 650 mg by mouth every 8 (eight) hours as needed for mild pain   albuterol (PROVENTIL HFA,VENTOLIN HFA) 90 mcg/act inhaler   Yes No   Sig: Inhale 2 puffs every 6 (six) hours as needed for wheezing   ascorbic acid (VITAMIN C) 500 MG tablet   Yes No   Sig: Take 500 mg by mouth daily   atorvastatin (LIPITOR) 80 mg tablet   Yes No   Sig: Take 80 mg by mouth daily   bisacodyl (DULCOLAX) 10 mg suppository   Yes Yes   Sig: Insert 10 mg into the rectum daily as needed for constipation   calcium carbonate (OS-JERARDO) 600 MG tablet   Yes No   Sig: Take 600 mg by mouth daily   clotrimazole (MYCELEX) 10 mg lia   Yes No   Si Lia   denosumab (Prolia) 60 mg/mL   Yes No   Sig: as directed Subcutaneous   diltiazem (CARDIZEM CD) 180 mg 24 hr capsule   Yes No   diltiazem (DILACOR XR) 180 MG 24 hr capsule   Yes No   Sig: Take 180 mg by mouth daily   esomeprazole (NexIUM) 40 MG capsule   Yes No   Sig: Take 1 capsule by mouth daily   fluticasone-umeclidinium-vilanterol (Trelegy Ellipta) 100-62.5-25 mcg/actuation inhaler   Yes No   Si puff every 24 hours   furosemide (LASIX) 40 mg tablet   Yes No   Sig: Take 20  mg by mouth daily   guaiFENesin (MUCINEX) 600 mg 12 hr tablet   Yes No   Si TAB ORALLY EVERY 12 HOURS DX: COUGH   lactase (LACTAID) 3,000 units tablet   Yes No   Sig: Take 9,000 Units by mouth 3 (three) times a day as needed   lamoTRIgine (LaMICtal) 100 mg tablet   Yes No   Sig: Take 100 mg by mouth 2 (two) times a day   levothyroxine 125 mcg tablet   Yes No   Sig: Take 125 mcg by mouth daily   loperamide (IMODIUM) 2 mg capsule   Yes No   magnesium hydroxide (MILK OF MAGNESIA) 400 mg/5 mL oral suspension   Yes No   Sig: Take 30 mL by mouth   montelukast (SINGULAIR) 10 mg tablet   Yes No   Sig: Take 10 mg by mouth daily at bedtime   omeprazole (PriLOSEC) 20 mg delayed release capsule   Yes No   Sig: Take 20 mg by mouth daily   oxybutynin (DITROPAN) 5 mg tablet   Yes No   Sig: Take 5 mg by mouth 3 (three) times a day   potassium chloride (K-DUR,KLOR-CON) 10 mEq tablet   No No   Sig: Take 1 tablet (10 mEq total) by mouth daily   senna (SENOKOT) 8.6 MG tablet   Yes No   Sig: Take 1 tablet by mouth daily as needed for constipation   sertraline (ZOLOFT) 100 mg tablet   Yes No   Sig: Take 100 mg by mouth daily   sodium phosphate-biphosphate (FLEET) 7-19 g 118 mL enema   Yes No   Sig: Insert 118 mL into the rectum   warfarin (COUMADIN) 2.5 mg tablet   Yes No   Sig: Take by mouth daily      Facility-Administered Medications: None       Allergies   Allergen Reactions    Erythromycin Photosensitivity    Shellfish-Derived Products - Food Allergy Hives    Ephedrine Rash    Iodinated Contrast Media Rash    Latex Rash    Nsaids Rash    Povidone Iodine Rash    Salicylates Rash    Shrimp (Diagnostic) - Food Allergy Rash    Sympathomimetics Rash    Tiotropium Rash    Tositumomab Rash       PHYSICAL EXAM    PE limited by:     Objective   Vitals:   First set: Temperature: 98.4 °F (36.9 °C) (23 1648)  Pulse: (!) 119 (23 1648)  Respirations: 17 (23 1648)  Blood Pressure: 160/93 (23 1648)  SpO2: 94 % (23  1878)    Primary Survey:   (A) Airway: Intact  (B) Breathing: Intact  (C) Circulation: Pulses:   normal  (D) Disabliity:  GCS Total:  15  (E) Expose:  Completed    Secondary Survey: (Click on Physical Exam tab above)  Physical Exam  Vitals and nursing note reviewed.   Constitutional:       General: She is not in acute distress.     Appearance: She is well-developed.   HENT:      Head: Normocephalic and atraumatic.      Mouth/Throat:      Mouth: Mucous membranes are dry.      Comments: Mucous membranes are very dry.  Eyes:      Conjunctiva/sclera: Conjunctivae normal.   Neck:      Comments: No mid spinous or paraspinous tenderness on palpation noted to the cervical spine.  Cardiovascular:      Rate and Rhythm: Tachycardia present. Rhythm irregular.      Heart sounds: No murmur heard.     Comments: A-fib RVR noted on the monitor.  Pulmonary:      Effort: Pulmonary effort is normal. No respiratory distress.      Breath sounds: Normal breath sounds.   Abdominal:      Palpations: Abdomen is soft.      Tenderness: There is no abdominal tenderness.   Musculoskeletal:         General: No swelling.      Cervical back: Neck supple.      Comments: Old bruising noted along left thigh and left lower leg region.  Superficial abrasion and some ecchymosis noted to the right lower rib region in the posterior axillary line that is tender to palpation.   Skin:     General: Skin is warm and dry.      Capillary Refill: Capillary refill takes less than 2 seconds.   Neurological:      General: No focal deficit present.      Mental Status: She is alert and oriented to person, place, and time.      Comments: Generalized weakness noted without any focal neurodeficits.   Psychiatric:         Mood and Affect: Mood normal.         Cervical spine cleared by clinical criteria? No (imaging required)      Invasive Devices       Peripheral Intravenous Line  Duration             Peripheral IV 12/18/23 Left;Proximal;Ventral (anterior) Forearm <1 day               Drain  Duration             External Urinary Catheter 85 days                    Lab Results:   Results Reviewed       Procedure Component Value Units Date/Time    HS Troponin I 2hr [[id number]]  (Normal) Collected: 12/18/23 1910    Lab Status: Final result Specimen: Blood from Arm, Left Updated: 12/18/23 2001     hs TnI 2hr 20 ng/L      Delta 2hr hsTnI 4 ng/L     APTT [[id number]]  (Abnormal) Collected: 12/18/23 1822    Lab Status: Final result Specimen: Blood from Arm, Right Updated: 12/18/23 1937     PTT 53 seconds     Protime-INR [[id number]]  (Abnormal) Collected: 12/18/23 1822    Lab Status: Final result Specimen: Blood from Arm, Right Updated: 12/18/23 1937     Protime 52.7 seconds      INR 5.82    FLU/RSV/COVID - if FLU/RSV clinically relevant [[id number]]  (Normal) Collected: 12/18/23 1818    Lab Status: Final result Specimen: Nares from Nose Updated: 12/18/23 1916     SARS-CoV-2 Negative     INFLUENZA A PCR Negative     INFLUENZA B PCR Negative     RSV PCR Negative    Narrative:      FOR PEDIATRIC PATIENTS - copy/paste COVID Guidelines URL to browser: [email or web address]    SARS-CoV-2 assay is a Nucleic Acid Amplification assay intended for the  qualitative detection of nucleic acid from SARS-CoV-2 in nasopharyngeal  swabs. Results are for the presumptive identification of SARS-CoV-2 RNA.    Positive results are indicative of infection with SARS-CoV-2, the virus  causing COVID-19, but do not rule out bacterial infection or co-infection  with other viruses. Laboratories within the [place] and its  territories are required to report all positive results to the appropriate  public health authorities. Negative results do not preclude SARS-CoV-2  infection and should not be used as the sole basis for treatment or other  patient management decisions. Negative results must be combined with  clinical observations, patient history, and  epidemiological information.  This test has not been FDA cleared or approved.    This test has been authorized by FDA under an Emergency Use Authorization  (EUA). This test is only authorized for the duration of time the  declaration that circumstances exist justifying the authorization of the  emergency use of an in vitro diagnostic tests for detection of SARS-CoV-2  virus and/or diagnosis of COVID-19 infection under section 564(b)(1) of  the Act, 21 U.S.C. 360bbb-3(b)(1), unless the authorization is terminated  or revoked sooner. The test has been validated but independent review by FDA  and CLIA is pending.    Test performed using [employer] GeneXpert: This RT-PCR assay targets N2,  a region unique to SARS-CoV-2. A conserved region in the E-gene was chosen  for pan-Sarbecovirus detection which includes SARS-CoV-2.    According to CMS-2020-01-R, this platform meets the definition of high-throughput technology.    HS Troponin I 4hr [[id number]]     Lab Status: No result Specimen: Blood     Urine Microscopic [[id number]]  (Abnormal) Collected: 12/18/23 1818    Lab Status: Final result Specimen: Urine, Other Updated: 12/18/23 1907     RBC, UA 1-2 /hpf      WBC, UA Innumerable /hpf      Epithelial Cells Occasional /hpf      Bacteria, UA Innumerable /hpf      OTHER OBSERVATIONS WBCs Clumped    Narrative:      Microscopic performed by Criselda Nolan.     Urine culture [[id number]] Collected: 12/18/23 1818    Lab Status: In process Specimen: Urine, Other Updated: 12/18/23 1907    UA w Reflex to Microscopic w Reflex to Culture [[id number]]  (Abnormal) Collected: 12/18/23 1818    Lab Status: Final result Specimen: Urine, Other Updated: 12/18/23 1906     Color, UA Light Yellow     Clarity, UA Clear     Specific Gravity, UA <1.005     pH, UA 8.0     Leukocytes, UA Large     Nitrite, UA Negative     Protein, UA 30 (1+) mg/dl      Glucose, UA Negative mg/dl      Ketones, UA Negative mg/dl      Urobilinogen, UA <2.0 mg/dl       Bilirubin, UA Negative     Occult Blood, UA Small    HS Troponin 0hr (reflex protocol) [[id number]]  (Normal) Collected: 12/18/23 1713    Lab Status: Final result Specimen: Blood from Arm, Left Updated: 12/18/23 1756     hs TnI 0hr 16 ng/L     Comprehensive metabolic panel [[id number]]  (Abnormal) Collected: 12/18/23 1713    Lab Status: Final result Specimen: Blood from Arm, Left Updated: 12/18/23 1748     Sodium 132 mmol/L      Potassium 3.9 mmol/L      Chloride 91 mmol/L      CO2 33 mmol/L      ANION GAP 8 mmol/L      BUN 19 mg/dL      Creatinine 1.16 mg/dL      Glucose 75 mg/dL      Calcium 9.3 mg/dL      AST 59 U/L      ALT 35 U/L      Alkaline Phosphatase 93 U/L      Total Protein 6.4 g/dL      Albumin 3.9 g/dL      Total Bilirubin 0.43 mg/dL      eGFR 43 ml/min/1.73sq m     Narrative:      National Kidney Disease Foundation guidelines for Chronic Kidney Disease (CKD):     Stage 1 with normal or high GFR (GFR > 90 mL/min/1.73 square meters)    Stage 2 Mild CKD (GFR = 60-89 mL/min/1.73 square meters)    Stage 3A Moderate CKD (GFR = 45-59 mL/min/1.73 square meters)    Stage 3B Moderate CKD (GFR = 30-44 mL/min/1.73 square meters)    Stage 4 Severe CKD (GFR = 15-29 mL/min/1.73 square meters)    Stage 5 End Stage CKD (GFR <15 mL/min/1.73 square meters)  Note: GFR calculation is accurate only with a steady state creatinine    Magnesium [[id number]]  (Abnormal) Collected: 12/18/23 1713    Lab Status: Final result Specimen: Blood from Arm, Left Updated: 12/18/23 1748     Magnesium 1.4 mg/dL     CBC and differential [[id number]]  (Abnormal) Collected: 12/18/23 1713    Lab Status: Final result Specimen: Blood from Arm, Left Updated: 12/18/23 1733     WBC 10.57 Thousand/uL      RBC 3.04 Million/uL      Hemoglobin 8.7 g/dL      Hematocrit 28.6 %      MCV 94 fL      MCH 28.6 pg      MCHC 30.4 g/dL      RDW 16.6 %      MPV 9.3 fL      Platelets 263 Thousands/uL      nRBC 0 /100 WBCs      Neutrophils Relative 83 %      Immat  GRANS % 1 %      Lymphocytes Relative 7 %      Monocytes Relative 9 %      Eosinophils Relative 0 %      Basophils Relative 0 %      Neutrophils Absolute 8.67 Thousands/µL      Immature Grans Absolute 0.14 Thousand/uL      Lymphocytes Absolute 0.78 Thousands/µL      Monocytes Absolute 0.91 Thousand/µL      Eosinophils Absolute 0.03 Thousand/µL      Basophils Absolute 0.04 Thousands/µL                    Imaging Studies:   Direct to CT: Yes  CT head without contrast   Final Result by Kishor Garland DO (12/18 1857)      CT spine cervical without contrast   Final Result by Kishor Garland DO (12/18 1911)   No cervical spine fracture or traumatic malalignment.                  Workstation performed: [id number]         CT chest abdomen pelvis w contrast   Final Result by Kishor Garland DO (12/18 2010)   Addendum (preliminary) 1 of 1 by Kishor Garland DO (12/18 2010)   ADDENDUM:   I personally discussed this study with BEKAH KRAMER on 12/18/2023 8:10    PM.            Final   Acute fractures of right posterior and posterolateral 10th rib with associated edema in the costochondral musculature. No pneumothorax, pleural effusion, or pulmonary contusion.      Numerous small pulmonary nodules, measuring up to 5 mm, stable from January 2023. Long-term stability is reassuring for benignity. Recommend follow-up in January 2025 to complete 2 years of observation.      Large left inguinal hernia containing a long segment of descending colon and proximal sigmoid, without evidence of obstruction or incarceration.      Several chronic fractures described above.      Attempting to contact referring physician.         Workstation performed: [id number]               Procedures  ECG 12 Lead Documentation Only    Date/Time: 12/18/2023 7:16 PM    Performed by: Bekah Kramer DO  Authorized by: Bekah Kramer DO    Indications / Diagnosis:  Fall  ECG reviewed by me, the ED Provider: yes    Patient location:  ED  Previous  ECG:     Previous ECG:  Compared to current    Similarity:  No change    Comparison to cardiac monitor: Yes    Interpretation:     Interpretation: abnormal    Comments:      Irregularly irregular rhythm, rate 110, normal axis, normal intervals, no acute ST elevations noted, T wave inversions noted in lead V5 and V6 suggesting lateral ischemic changes compared to previous EKG, atrial fibrillation with rapid ventricular response noted that is unchanged from previous study.  CriticalCare Time    Date/Time: 12/18/2023 8:28 PM    Performed by: Bekah Greenwood DO  Authorized by: Bekah Greenwood DO    Critical care provider statement:     Critical care time (minutes):  35    Critical care time was exclusive of:  Separately billable procedures and treating other patients    Critical care was necessary to treat or prevent imminent or life-threatening deterioration of the following conditions:  Trauma    Critical care was time spent personally by me on the following activities:  Blood draw for specimens, obtaining history from patient or surrogate, development of treatment plan with patient or surrogate, discussions with consultants, evaluation of patient's response to treatment, examination of patient, review of old charts, re-evaluation of patient's condition, ordering and review of laboratory studies, ordering and review of radiographic studies, interpretation of cardiac output measurements and ordering and performing treatments and interventions           ED Course  ED Course as of 12/18/23 2050   Mon Dec 18, 2023   1658 Chest x-ray was not obtained as patient is going directly to CT scan.   1707 Case discussed with CT tech who notes that patient was given Benadryl prior to her contrast study in the past without any other post contrast reactions.  Patient will be given Benadryl prior to her studies today.   2016 Patient reexamined at bedside.  Patient complains of some pain.  Dilaudid ordered.  Patient agreeable to  admission plans.           Medical Decision Making  Obtain blood work, UA, CT head/neck/chest/abdomen/pelvis  Give IV fluids and continue to monitor patient's heart rate and any other worsening changes.    CT head and neck was unremarkable.  Patient's INR is elevated.  UA showed concern for UTI.  Empiric IV antibiotic started.  Patient was noted to have atrial fibrillation with rapid ventricular response with heart rate in the low 100s.  Patient given dose of Lopressor.  Pain controlled with IV Dilaudid.  Patient noted to have right posterior lateral 10th rib fracture.  At this time patient is admitted for further evaluation and management.  Patient agrees with admission plans.    Amount and/or Complexity of Data Reviewed  Labs: ordered. Decision-making details documented in ED Course.  Radiology: ordered. Decision-making details documented in ED Course.  ECG/medicine tests: ordered and independent interpretation performed. Decision-making details documented in ED Course.    Risk  Prescription drug management.  Decision regarding hospitalization.                Disposition  [facility] Transfer: No  Final diagnoses:   Recurrent falls   UTI (urinary tract infection)   Elevated INR   Fracture of one rib, right side, initial encounter for closed fracture   Pulmonary nodules   Traumatic ecchymosis of rib, initial encounter   Atrial fibrillation with rapid ventricular response (HCC)   Anemia     Time reflects when diagnosis was documented in both MDM as applicable and the Disposition within this note       Time User Action Codes Description Comment    12/18/2023  7:31 PM Bekah Greenwood Add [R29.6] Recurrent falls     12/18/2023  7:31 PM Bekah Greenwood Add [N39.0] UTI (urinary tract infection)     12/18/2023  7:41 PM Bekah Greenwood Add [R79.1] Elevated INR     12/18/2023  8:15 PM Bekah Greenwood Add [S22.31XA] Fracture of one rib, right side, initial encounter for closed fracture     12/18/2023  8:18 PM Krystyna  Bekah Choudhury [R91.8] Pulmonary nodules     12/18/2023  8:19 PM Bekah Greenwood [S20.20XA] Traumatic ecchymosis of rib, initial encounter     12/18/2023  8:28 PM Bekah Greenwood [I48.91] Atrial fibrillation with rapid ventricular response (HCC)     12/18/2023  8:50 PM Bekah Greenwood [D64.9] Anemia           ED Disposition       ED Disposition   Admit    Condition   Stable    Date/Time   Mon Dec 18, 2023  7:32 PM    Comment   Case was discussed with Hospitalist AP and the patient's admission status was agreed to be Admission Status: inpatient status to the service of Dr. Kumar.               Follow-up Information    None       Patient's Medications   Discharge Prescriptions    No medications on file     No discharge procedures on file.    PDMP Review       None            ED Provider  Electronically Signed by           Bekah Greenwood DO  12/18/23 2028       Bekah Greenwood DO  12/18/23 2050

## 2023-12-19 NOTE — PLAN OF CARE
Problem: Potential for Falls  Goal: Patient will remain free of falls  Description: INTERVENTIONS:  - Educate patient/family on patient safety including physical limitations  - Instruct patient to call for assistance with activity   - Consult OT/PT to assist with strengthening/mobility   - Keep Call bell within reach  - Keep bed low and locked with side rails adjusted as appropriate  - Keep care items and personal belongings within reach  - Initiate and maintain comfort rounds  - Make Fall Risk Sign visible to staff  - Offer Toileting every  Hours, in advance of need  - Initiate/Maintain alarm  - Obtain necessary fall risk management equipment:   - Apply yellow socks and bracelet for high fall risk patients  - Consider moving patient to room near nurses station  Outcome: Progressing     Problem: Prexisting or High Potential for Compromised Skin Integrity  Goal: Skin integrity is maintained or improved  Description: INTERVENTIONS:  - Identify patients at risk for skin breakdown  - Assess and monitor skin integrity  - Assess and monitor nutrition and hydration status  - Monitor labs   - Assess for incontinence   - Turn and reposition patient  - Assist with mobility/ambulation  - Relieve pressure over bony prominences  - Avoid friction and shearing  - Provide appropriate hygiene as needed including keeping skin clean and dry  - Evaluate need for skin moisturizer/barrier cream  - Collaborate with interdisciplinary team   - Patient/family teaching  - Consider wound care consult   Outcome: Progressing     Problem: GENITOURINARY - ADULT  Goal: Maintains or returns to baseline urinary function  Description: INTERVENTIONS:  - Assess urinary function  - Encourage oral fluids to ensure adequate hydration if ordered  - Administer IV fluids as ordered to ensure adequate hydration  - Administer ordered medications as needed  - Offer frequent toileting  - Follow urinary retention protocol if ordered  Outcome: Progressing  Goal:  Absence of urinary retention  Description: INTERVENTIONS:  - Assess patient’s ability to void and empty bladder  - Monitor I/O  - Bladder scan as needed  - Discuss with physician/AP medications to alleviate retention as needed  - Discuss catheterization for long term situations as appropriate  Outcome: Progressing  Goal: Urinary catheter remains patent  Description: INTERVENTIONS:  - Assess patency of urinary catheter  - If patient has a chronic wang, consider changing catheter if non-functioning  - Follow guidelines for intermittent irrigation of non-functioning urinary catheter  Outcome: Progressing     Problem: METABOLIC, FLUID AND ELECTROLYTES - ADULT  Goal: Electrolytes maintained within normal limits  Description: INTERVENTIONS:  - Monitor labs and assess patient for signs and symptoms of electrolyte imbalances  - Administer electrolyte replacement as ordered  - Monitor response to electrolyte replacements, including repeat lab results as appropriate  - Instruct patient on fluid and nutrition as appropriate  Outcome: Progressing  Goal: Fluid balance maintained  Description: INTERVENTIONS:  - Monitor labs   - Monitor I/O and WT  - Instruct patient on fluid and nutrition as appropriate  - Assess for signs & symptoms of volume excess or deficit  Outcome: Progressing  Goal: Glucose maintained within target range  Description: INTERVENTIONS:  - Monitor Blood Glucose as ordered  - Assess for signs and symptoms of hyperglycemia and hypoglycemia  - Administer ordered medications to maintain glucose within target range  - Assess nutritional intake and initiate nutrition service referral as needed  Outcome: Progressing     Problem: SKIN/TISSUE INTEGRITY - ADULT  Goal: Skin Integrity remains intact(Skin Breakdown Prevention)  Description: Assess:  -Perform [place] assessment every   -Clean and moisturize skin every   -Inspect skin when repositioning, toileting, and assisting with ADLS  -Assess under medical devices such  as  every   -Assess extremities for adequate circulation and sensation     Bed Management:  -Have minimal linens on bed & keep smooth, unwrinkled  -Change linens as needed when moist or perspiring  -Avoid sitting or lying in one position for more than  hours while in bed  -Keep HOB at degrees     Toileting:  -Offer bedside commode  -Assess for incontinence every   -Use incontinent care products after each incontinent episode such as     Activity:  -Mobilize patient  times a day  -Encourage activity and walks on unit  -Encourage or provide ROM exercises   -Turn and reposition patient every  Hours  -Use appropriate equipment to lift or move patient in bed  -Instruct/ Assist with weight shifting every  when out of bed in chair  -Consider limitation of chair time  hour intervals    Skin Care:  -Avoid use of baby powder, tape, friction and shearing, hot water or constrictive clothing  -Relieve pressure over bony prominences using   -Do not massage red bony areas    Next Steps:  -Teach patient strategies to minimize risks such as    -Consider consults to  interdisciplinary teams such as   Outcome: Progressing  Goal: Incision(s), wounds(s) or drain site(s) healing without S/S of infection  Description: INTERVENTIONS  - Assess and document dressing, incision, wound bed, drain sites and surrounding tissue  - Provide patient and family education  - Perform skin care/dressing changes every   Outcome: Progressing  Goal: Pressure injury heals and does not worsen  Description: Interventions:  - Implement low air loss mattress or specialty surface (Criteria met)  - Apply silicone foam dressing  - Instruct/assist with weight shifting every  minutes when in chair   - Limit chair time to  hour intervals  - Use special pressure reducing interventions such as  when in chair   - Apply fecal or urinary incontinence containment device   - Perform passive or active ROM every   - Turn and reposition patient & offload bony prominences every   hours   - Utilize friction reducing device or surface for transfers   - Consider consults to  interdisciplinary teams such as   - Use incontinent care products after each incontinent episode such as   - Consider nutrition services referral as needed  Outcome: Progressing     Problem: MUSCULOSKELETAL - ADULT  Goal: Maintain or return mobility to safest level of function  Description: INTERVENTIONS:  - Assess patient's ability to carry out ADLs; assess patient's baseline for ADL function and identify physical deficits which impact ability to perform ADLs (bathing, care of mouth/teeth, toileting, grooming, dressing, etc.)  - Assess/evaluate cause of self-care deficits   - Assess range of motion  - Assess patient's mobility  - Assess patient's need for assistive devices and provide as appropriate  - Encourage maximum independence but intervene and supervise when necessary  - Involve family in performance of ADLs  - Assess for home care needs following discharge   - Consider OT consult to assist with ADL evaluation and planning for discharge  - Provide patient education as appropriate  Outcome: Progressing  Goal: Maintain proper alignment of affected body part  Description: INTERVENTIONS:  - Support, maintain and protect limb and body alignment  - Provide patient/ family with appropriate education  Outcome: Progressing

## 2023-12-19 NOTE — ASSESSMENT & PLAN NOTE
CT chest   Acute fractures of right posterior and posterolateral 10th rib with associated edema in the costochondral musculature. No pneumothorax, pleural effusion, or pulmonary contusion.   Manage pain   IS   Rib protocol

## 2023-12-19 NOTE — ASSESSMENT & PLAN NOTE
On Coumadin due to history of AVR and Afib   INR 5.82   INR goal 2.5-3.5  Home regimen: coumadin 2.5 mg daily 6 days per week. 3.75 mg on Saturdays.   Hold Coumadin   Recheck INR daily

## 2023-12-19 NOTE — H&P
[facility]  H&P  Name: Apryl Casas 83 y.o. female I MRN: [id number]  Unit/Bed#: ED 03 I Date of Admission: 12/18/2023   Date of Service: 12/18/2023 I Hospital Day: 0      Assessment/Plan   * Ambulatory dysfunction  Assessment & Plan  Presents from facility after sliding out of her chair onto the right side. Pain to right lower rib region. Denies hitting head or having LOC. Fall unwitnessed.   Chronic ambulatory difficulty.   Admission at [facility] under trauma service in September 2023 after mechanical fall with traumatic hematoma of her left shoulder.  DC to [facility] for STR.   Worsening weakness past couple of days in the setting of UTI   CT head and CT cervical spine negative   CT chest abdomen pelvis pending   Acute fractures of right posterior and posterolateral 10th rib with associated edema in the costochondral musculature. No pneumothorax, pleural effusion, or pulmonary contusion.   Large left inguinal hernia containing a long segment of descending colon and proximal sigmoid, without evidence of obstruction or incarceration.   Fall precautions   Consult PT/OT and CM     Atrial fibrillation with rapid ventricular response (HCC)  Assessment & Plan  Noted to be in Afib with RVR (HR into the 120s)  Home regimen   Diltiazem 180 mg daily   Coumadin (Hold to supratherapeutic INR)  Mag 1.4/Potassium 3.9   Replete magnesium.   IV lopressor 5 mg prn for HR > 120 sustained.   Consider adding metoprolol tartrate 25 mg bid to home medication list.   Telemetry   Cardiology consult     UTI (urinary tract infection)  Assessment & Plan  Diagnosed with UTI outpatient. Prescribed bactrim 12/14-12/24.   Urine sample (12/18)  UA: large amount of leuks and negative nitrite   Micro: Large amount of wbc and bacteria   Will transition to IV Ceftriaxone   Urine culture pending     Supratherapeutic INR  Assessment & Plan  On Coumadin due to history of AVR and Afib   INR 5.82   INR goal 2.5-3.5  Home  regimen: coumadin 2.5 mg daily 6 days per week. 3.75 mg on Saturdays.   Hold Coumadin   Recheck INR daily    Closed fracture of multiple ribs of right side  Assessment & Plan  CT chest   Acute fractures of right posterior and posterolateral 10th rib with associated edema in the costochondral musculature. No pneumothorax, pleural effusion, or pulmonary contusion.   Manage pain   IS   Rib protocol     Pulmonary nodules  Assessment & Plan  CT chest   Numerous small pulmonary nodules, measuring up to 5 mm, stable from January 2023. Long-term stability is reassuring for benignity. Recommend follow-up in January 2025 to complete 2 years of observation.     Hypomagnesemia  Assessment & Plan  Magnesium 1.4   Denies N/V/D   Replete   Continue to monitor     Epilepsy (HCC)  Assessment & Plan  Continue home lamictal   Seizure precautions     S/P aortic valve replacement  Assessment & Plan  S/p AVR   Chronically on coumadin. Held due to INR   INR goal 2.5-3.5            VTE Pharmacologic Prophylaxis: VTE Score: 3 Moderate Risk (Score 3-4) - Pharmacological DVT Prophylaxis Contraindicated. Sequential Compression Devices Ordered.  Code Status: Level 1 - Full Code   Discussion with family: Patient declined call to .     Anticipated Length of Stay: Patient will be admitted on an inpatient basis with an anticipated length of stay of greater than 2 midnights secondary to Amb dys .    Total Time Spent on Date of Encounter in care of patient: 55 mins. This time was spent on one or more of the following: performing physical exam; counseling and coordination of care; obtaining or reviewing history; documenting in the medical record; reviewing/ordering tests, medications or procedures; communicating with other healthcare professionals and discussing with patient's family/caregivers.    Chief Complaint: Fall     History of Present Illness:  Apryl Casas is a 83 y.o. female with a PMH of atrial fibrillation, epilepsy,  aortic valve replacement, chronic anticoagulation on Coumadin who presents from facility after an unwitnessed fall.  Per patient she slid out of her chair onto the right side.  Reporting pain to her right lower rib region.  Denies hitting her head or having loss of consciousness.  Trauma workup performed in the ER.  Patient found to have multiple fractures of the right side.  No other injuries noted.  Patient also informed to have current UTI infection since 12/14.  Compliant on Bactrim.  Urine sample performed today shows continued infection.  Transition to IV antibiotics.  Not meeting SIRS criteria.      Patient also with history of A-fib.  Heart rates into the 120s.  Magnesium found to be 1.4.  Patient denies nausea, vomiting or diarrhea.  Received magnesium and IV Lopressor with improvement.    Review of Systems:  Review of Systems   Constitutional:  Negative for fatigue and fever.   HENT:  Negative for sore throat.    Respiratory:  Negative for cough, chest tightness and shortness of breath.    Cardiovascular:  Negative for chest pain.   Gastrointestinal:  Negative for abdominal distention, abdominal pain, diarrhea, nausea and vomiting.   Genitourinary:  Negative for difficulty urinating.   Musculoskeletal:  Negative for arthralgias.        Right-sided rib pain   Neurological:  Negative for weakness and headaches.   Psychiatric/Behavioral:  Negative for agitation and behavioral problems.    All other systems reviewed and are negative.      Past Medical and Surgical History:   Past Medical History:   Diagnosis Date    Anemia     Anxiety disorder, unspecified     Anxiety disorder, unspecified     Arthritis     Asthma     Cardiac arrest, cause unspecified (HCC)     Chronic obstructive pulmonary disease, unspecified (HCC)     Constipation     Essential (primary) hypertension     GERD (gastroesophageal reflux disease)     Hypo-osmolality and hyponatremia     Hypothyroidism     Nontraumatic hematoma of soft tissue      Other seizures (HCC)     Persistent atrial fibrillation (HCC)     Unspecified urinary incontinence        Past Surgical History:   Procedure Laterality Date    APPENDECTOMY      CARDIAC PACEMAKER PLACEMENT      FEMUR FRACTURE SURGERY         Meds/Allergies:  Prior to Admission medications    Medication Sig Start Date End Date Taking? Authorizing Provider   acetaminophen (TYLENOL) 325 mg tablet Take 650 mg by mouth every 8 (eight) hours as needed for mild pain    Historical Provider, MD   albuterol (PROVENTIL HFA,VENTOLIN HFA) 90 mcg/act inhaler Inhale 2 puffs every 6 (six) hours as needed for wheezing    Historical Provider, MD   ascorbic acid (VITAMIN C) 500 MG tablet Take 500 mg by mouth daily    Historical Provider, MD   atorvastatin (LIPITOR) 80 mg tablet Take 80 mg by mouth daily    Historical Provider, MD   Calcium Carb-Cholecalciferol 600-10 MG-MCG TABS Take 1 tablet by mouth every morning 8/31/23   Historical Provider, MD   calcium carbonate (OS-JERARDO) 600 MG tablet Take 600 mg by mouth daily    Historical Provider, MD   clotrimazole (MYCELEX) 10 mg lia 1 Lia 8/31/23   Historical Provider, MD   denosumab (Prolia) 60 mg/mL as directed Subcutaneous 11/9/23   Historical Provider, MD   diltiazem (CARDIZEM CD) 180 mg 24 hr capsule  10/26/23   Historical Provider, MD   diltiazem (DILACOR XR) 180 MG 24 hr capsule Take 180 mg by mouth daily    Historical Provider, MD   esomeprazole (NexIUM) 40 MG capsule Take 1 capsule by mouth daily 11/9/23   Historical Provider, MD   Fluticasone-Salmeterol (Advair) 100-50 mcg/dose inhaler Inhale 1 puff 2 (two) times a day Rinse mouth after use.    Historical Provider, MD   fluticasone-umeclidinium-vilanterol (Trelegy Ellipta) 100-62.5-25 mcg/actuation inhaler 1 puff every 24 hours 8/31/23   Historical Provider, MD   furosemide (LASIX) 40 mg tablet Take 20 mg by mouth daily    Historical Provider, MD   guaiFENesin (MUCINEX) 600 mg 12 hr tablet 1 TAB ORALLY EVERY 12 HOURS DX:  COUGH 11/9/23   Historical Provider, MD   lactase (LACTAID) 3,000 units tablet Take 9,000 Units by mouth 3 (three) times a day as needed    Historical Provider, MD   lamoTRIgine (LaMICtal) 100 mg tablet Take 100 mg by mouth 2 (two) times a day    Historical Provider, MD   levothyroxine 125 mcg tablet Take 125 mcg by mouth daily    Historical Provider, MD   Lidocaine 4 % PTCH Apply 1 patch topically daily at bedtime On for 12 hrs, off for 12 hrs    Historical Provider, MD   loperamide (IMODIUM) 2 mg capsule  10/10/23   Historical Provider, MD   magnesium hydroxide (MILK OF MAGNESIA) 400 mg/5 mL oral suspension Take 30 mL by mouth 9/29/23   Historical Provider, MD   montelukast (SINGULAIR) 10 mg tablet Take 10 mg by mouth daily at bedtime    Historical Provider, MD   Multiple Vitamin (multivitamin) tablet Take 1 tablet by mouth daily    Historical Provider, MD   omeprazole (PriLOSEC) 20 mg delayed release capsule Take 20 mg by mouth daily    Historical Provider, MD   oxybutynin (DITROPAN) 5 mg tablet Take 5 mg by mouth 3 (three) times a day    Historical Provider, MD   potassium chloride (K-DUR,KLOR-CON) 10 mEq tablet Take 1 tablet (10 mEq total) by mouth daily 12/13/23   Dianelys Hernandez MD   senna (SENOKOT) 8.6 MG tablet Take 1 tablet by mouth daily as needed for constipation    Historical Provider, MD   sertraline (ZOLOFT) 100 mg tablet Take 100 mg by mouth daily    Historical Provider, MD   sodium phosphate-biphosphate (FLEET) 7-19 g 118 mL enema Insert 118 mL into the rectum 9/29/23   Historical Provider, MD   Symbicort 160-4.5 MCG/ACT inhaler Inhale Every 12 hours 9/29/23   Historical Provider, MD   warfarin (COUMADIN) 2.5 mg tablet Take by mouth daily    Historical Provider, MD     I have reveiwed home medications using records provided by [facility].    Allergies:   Allergies   Allergen Reactions    Erythromycin Photosensitivity    Shellfish-Derived Products - Food Allergy Hives    Ephedrine Rash    Iodinated  Contrast Media Rash    Latex Rash    Nsaids Rash    Povidone Iodine Rash    Salicylates Rash    Shrimp (Diagnostic) - Food Allergy Rash    Sympathomimetics Rash    Tiotropium Rash    Tositumomab Rash       Social History:  Marital Status:    Occupation: Retired  Patient Pre-hospital Living Situation: Long Term Care Facility  Patient Pre-hospital Level of Mobility: unable to be assessed at time of evaluation  Patient Pre-hospital Diet Restrictions: Regular  Substance Use History:   Social History     Substance and Sexual Activity   Alcohol Use Not Currently     Social History     Tobacco Use   Smoking Status Never   Smokeless Tobacco Never     Social History     Substance and Sexual Activity   Drug Use Never       Family History:  Family History   Family history unknown: Yes       Physical Exam:     Vitals:   Blood Pressure: 114/60 (12/18/23 2100)  Pulse: 90 (12/18/23 2100)  Temperature: 98.4 °F (36.9 °C) (12/18/23 1648)  Temp Source: Temporal (12/18/23 1648)  Respirations: 18 (12/18/23 2100)  SpO2: 92 % (12/18/23 2100)    Physical Exam  Vitals and nursing note reviewed.   Constitutional:       Appearance: Normal appearance.   HENT:      Head: Normocephalic.   Eyes:      Extraocular Movements: Extraocular movements intact.      Pupils: Pupils are equal, round, and reactive to light.   Cardiovascular:      Rate and Rhythm: Tachycardia present. Rhythm irregular.      Heart sounds: No murmur heard.  Pulmonary:      Effort: Pulmonary effort is normal. No respiratory distress.      Breath sounds: Normal breath sounds. No wheezing.   Abdominal:      General: Bowel sounds are normal. There is no distension.      Tenderness: There is no abdominal tenderness. There is no guarding.   Musculoskeletal:         General: Tenderness (right rib cage) present. Normal range of motion.      Cervical back: Normal range of motion.      Right lower leg: No edema.      Left lower leg: No edema.   Skin:     General: Skin is warm.    Neurological:      General: No focal deficit present.      Mental Status: She is alert. Mental status is at baseline.   Psychiatric:         Mood and Affect: Mood normal.         Behavior: Behavior normal.         Thought Content: Thought content normal.          Additional Data:     Lab Results:  Results from last 7 days   Lab Units 12/18/23  1713   WBC Thousand/uL 10.57*   HEMOGLOBIN g/dL 8.7*   HEMATOCRIT % 28.6*   PLATELETS Thousands/uL 263   NEUTROS PCT % 83*   LYMPHS PCT % 7*   MONOS PCT % 9   EOS PCT % 0     Results from last 7 days   Lab Units 12/18/23  1713   SODIUM mmol/L 132*   POTASSIUM mmol/L 3.9   CHLORIDE mmol/L 91*   CO2 mmol/L 33*   BUN mg/dL 19   CREATININE mg/dL 1.16   ANION GAP mmol/L 8   CALCIUM mg/dL 9.3   ALBUMIN g/dL 3.9   TOTAL BILIRUBIN mg/dL 0.43   ALK PHOS U/L 93   ALT U/L 35   AST U/L 59*   GLUCOSE RANDOM mg/dL 75     Results from last 7 days   Lab Units 12/18/23  1822   INR  5.82*                   Lines/Drains:  Invasive Devices       Peripheral Intravenous Line  Duration             Peripheral IV 12/18/23 Left;Proximal;Ventral (anterior) Forearm <1 day              Drain  Duration             External Urinary Catheter 85 days                        Imaging: Reviewed radiology reports from this admission including: chest CT scan, abdominal/pelvic CT, and CT head  CT head without contrast   Final Result by Kishor Garland DO (12/18 1857)      CT spine cervical without contrast   Final Result by Kishor Garland DO (12/18 1911)   No cervical spine fracture or traumatic malalignment.                  Workstation performed: [id number]         CT chest abdomen pelvis w contrast   Final Result by Kishor Garland DO (12/18 2010)   Addendum (preliminary) 1 of 1 by Kishor Garland DO (12/18 2010)   ADDENDUM:   I personally discussed this study with DON KRAMER on 12/18/2023 8:10    PM.            Final   Acute fractures of right posterior and posterolateral 10th rib with associated  edema in the costochondral musculature. No pneumothorax, pleural effusion, or pulmonary contusion.      Numerous small pulmonary nodules, measuring up to 5 mm, stable from January 2023. Long-term stability is reassuring for benignity. Recommend follow-up in January 2025 to complete 2 years of observation.      Large left inguinal hernia containing a long segment of descending colon and proximal sigmoid, without evidence of obstruction or incarceration.      Several chronic fractures described above.      Attempting to contact referring physician.         Workstation performed: [id number]             EKG and Other Studies Reviewed on Admission:   EKG: Atrial fibrillation. .    ** Please Note: This note has been constructed using a voice recognition system. **

## 2023-12-19 NOTE — PROGRESS NOTES
Critical access hospital  Progress Note  Name: Apryl Casas I  MRN: [id number]  Unit/Bed#: [facility] I Date of Admission: 12/18/2023   Date of Service: 12/19/2023 I Hospital Day: 1    Assessment/Plan   Pulmonary nodules  Assessment & Plan  CT chest   Numerous small pulmonary nodules, measuring up to 5 mm, stable from January 2023. Long-term stability is reassuring for benignity. Recommend follow-up in January 2025 to complete 2 years of observation.     Closed fracture of multiple ribs of right side  Assessment & Plan  CT chest   Acute fractures of right posterior and posterolateral 10th rib with associated edema in the costochondral musculature. No pneumothorax, pleural effusion, or pulmonary contusion.   Manage pain   IS   Rib protocol     Hypomagnesemia  Assessment & Plan  Magnesium 1.4   Denies N/V/D   Replete   Continue to monitor     UTI (urinary tract infection)  Assessment & Plan  Diagnosed with UTI outpatient. Prescribed bactrim 12/14-12/24.   Urine sample (12/18)  UA: large amount of leuks and negative nitrite   Micro: Large amount of wbc and bacteria   Will transition to IV Ceftriaxone   Urine culture pending     Atrial fibrillation with rapid ventricular response (HCC)  Assessment & Plan  Noted to be in Afib with RVR (HR into the 120s)  Home regimen   Diltiazem 180 mg daily   Coumadin (Hold to supratherapeutic INR)  Mag 1.4/Potassium 3.9   Replete magnesium.   IV lopressor 5 mg prn for HR > 120 sustained.   Consider adding metoprolol tartrate 25 mg bid to home medication list.   Telemetry   Cardiology consult     Supratherapeutic INR  Assessment & Plan  On Coumadin due to history of AVR and Afib   INR 5.82   INR goal 2.5-3.5  Home regimen: coumadin 2.5 mg daily 6 days per week. 3.75 mg on Saturdays.   Hold Coumadin, remains supratherapeutic  Recheck INR daily    Epilepsy (HCC)  Assessment & Plan  Continue home lamictal   Seizure precautions     S/P aortic valve  replacement  Assessment & Plan  S/p AVR   Chronically on coumadin. Held due to INR   INR goal 2.5-3.5     * Ambulatory dysfunction  Assessment & Plan  Presents from facility after sliding out of her chair onto the right side. Pain to right lower rib region. Denies hitting head or having LOC. Fall unwitnessed.   Chronic ambulatory difficulty.   Admission at [facility] under trauma service in 2023 after mechanical fall with traumatic hematoma of her left shoulder.  DC to [facility] for STR.   Worsening weakness past couple of days in the setting of UTI   CT head and CT cervical spine negative   CT chest abdomen pelvis   Acute fractures of right posterior and posterolateral 10th rib with associated edema in the costochondral musculature. No pneumothorax, pleural effusion, or pulmonary contusion.   Large left inguinal hernia containing a long segment of descending colon and proximal sigmoid, without evidence of obstruction or incarceration.   Fall precautions   Consult PT/OT and CM           VTE  Prophylaxis:   Pharmacologic: in place  Mechanical VTE Prophylaxis in Place: Yes    Patient Centered Rounds: I have performed bedside rounds with nursing staff today.    Discussions with Specialists or Other Care Team Provider: case management    Education and Discussions with Family / Patient: pt       Current Length of Stay: 1 day(s)    Current Patient Status: Inpatient        Code Status: Level 1 - Full Code    Discharge Plan: Pt will require continued inpatient hospitalization.    Subjective:      Pt states that she feels better  Hr remain irreg and on the faster side       Patient is seen and examined at bedside.  All other ROS are negative.    Objective:     Vitals:   Temp (24hrs), Av.3 °F (36.8 °C), Min:98.2 °F (36.8 °C), Max:98.4 °F (36.9 °C)    Temp:  [98.2 °F (36.8 °C)-98.4 °F (36.9 °C)] 98.2 °F (36.8 °C)  HR:  [] 101  Resp:  [17-39] 23  BP: (106-160)/() 113/73  SpO2:  [90 %-95 %] 94 %  There is no  height or weight on file to calculate BMI.     Input and Output Summary (last 24 hours):       Intake/Output Summary (Last 24 hours) at 12/19/2023 1425  Last data filed at 12/19/2023 0601  Gross per 24 hour   Intake --   Output 1225 ml   Net -1225 ml       Physical Exam:       GEN: No acute distress, comfortable  HEEENT: No JVD, PERRLA, no scleral icterus  RESP: Lungs clear to auscultation bilaterally  CV: tachycardia, irreg irreg  ABD: SOFT NON TENDER, POSITIVE BOWEL SOUNDS, NO DISTENTION  PSYCH: CALM  NEURO: Mentation baseline, NO FOCAL DEFICITS  SKIN: NO RASH  EXTREM: NO EDEMA    Additional Data:     Labs:    Results from last 7 days   Lab Units 12/19/23  0543   WBC Thousand/uL 10.31*   HEMOGLOBIN g/dL 10.2*   HEMATOCRIT % 31.6*   PLATELETS Thousands/uL 279   NEUTROS PCT % 80*   LYMPHS PCT % 7*   MONOS PCT % 8   EOS PCT % 2     Results from last 7 days   Lab Units 12/19/23  0543 12/18/23  1713   SODIUM mmol/L 134* 132*   POTASSIUM mmol/L 4.2 3.9   CHLORIDE mmol/L 94* 91*   CO2 mmol/L 33* 33*   BUN mg/dL 12 19   CREATININE mg/dL 0.89 1.16   ANION GAP mmol/L 7 8   CALCIUM mg/dL 9.2 9.3   ALBUMIN g/dL  --  3.9   TOTAL BILIRUBIN mg/dL  --  0.43   ALK PHOS U/L  --  93   ALT U/L  --  35   AST U/L  --  59*   GLUCOSE RANDOM mg/dL 78 75     Results from last 7 days   Lab Units 12/19/23  0543   INR  4.77*                   Lines/Drains:  Invasive Devices       Peripheral Intravenous Line  Duration             Peripheral IV 12/18/23 Left;Proximal;Ventral (anterior) Forearm <1 day              Drain  Duration             External Urinary Catheter <1 day                    Telemetry:   Telemetry Orders (From admission, onward)               24 Hour Telemetry Monitoring  Continuous x 24 Hours (Telem)        Question:  Reason for 24 Hour Telemetry  Answer:  Arrhythmias requiring acute medical intervention / PPM or ICD malfunction                        * I Have Reviewed All Lab Data Listed Above.           Imaging:     Results  for orders placed during the hospital encounter of 09/22/23    XR Trauma chest portable    Narrative  CHEST    INDICATION:   TRAUMA.    COMPARISON: 1/23/2023    EXAM PERFORMED/VIEWS:  XR CHEST PORTABLE      FINDINGS:    Heart shadow is enlarged but unchanged from prior exam. Unchanged left-sided chest wall PPM/AICD. Post aVR.    No focal consolidation.  No pneumothorax or pleural effusion.    Median sternotomy changes. No acute osseous abnormality.    Impression  No acute cardiopulmonary disease.            Workstation performed: [id number]    No results found for this or any previous visit.      *I have reviewed all imaging reports listed above      Recent Cultures (last 7 days):           Last 24 Hours Medication List:   Current Facility-Administered Medications   Medication Dose Route Frequency Provider Last Rate    acetaminophen  650 mg Oral Q6H PRN Geneva Chappell PA-C      albuterol  2 puff Inhalation Q6H PRN Geneva Chappell PA-C      ascorbic acid  500 mg Oral Daily Geneva Chappell PA-C      atorvastatin  80 mg Oral Daily Geneva Chappell PA-C      bisacodyl  10 mg Rectal Daily PRN Geneva Chappell PA-C      calcium carbonate-vitamin D  1 tablet Oral Daily With Breakfast Geneva Chappell PA-C      cefTRIAXone  1,000 mg Intravenous Q24H Geneva Chappell PA-C      diltiazem  180 mg Oral Daily Geneva Chappell PA-C      Fluticasone Furoate-Vilanterol  1 puff Inhalation Daily Geneva Chappell PA-C      And    umeclidinium  1 puff Inhalation Daily Geneva Chappell PA-C      lamoTRIgine  100 mg Oral BID Geneva Chappell PA-C      levothyroxine  75 mcg Oral Daily Geneva Chappell PA-C      lidocaine  1 patch Topical Daily Geneva Chappell PA-C      montelukast  10 mg Oral HS Geneva Chappell PA-C      multivitamin stress formula  1 tablet Oral Daily Geneva Chappell PA-C      oxybutynin  5 mg Oral TID Geneva Chappell PA-C      oxyCODONE  5 mg Oral Q6H PRN Geneva Chappell PA-C      oxyCODONE  2.5 mg Oral Q6H PRN Geneva Chappell PA-C      pantoprazole  40 mg Oral Daily Before Breakfast Geneva Chappell  KERMIT      potassium chloride  10 mEq Oral Daily Geneva Chappell PA-C      senna  1 tablet Oral Daily PRN Geneva Chappell PA-C      sertraline  100 mg Oral HS Geneva Chappell PA-C          Today, Patient Was Seen By: Gracia Kumar MD    ** Please Note: Dictation voice to text software may have been used in the creation of this document. **

## 2023-12-19 NOTE — UTILIZATION REVIEW
Initial Clinical Review  Date: 12/20/23     Day 3: Has surpassed a 2nd midnight with active treatments and services, which include pending cultures, continued IV ceftriaxone.  Telemetry, incentive spirometry, pain control, monitoring of oxygen sats and oxygen to keep sat > 92%    Admission: Date/Time/Statement:   Admission Orders (From admission, onward)       Ordered        12/18/23 2027  INPATIENT ADMISSION  Once                          Orders Placed This Encounter   Procedures    INPATIENT ADMISSION     Standing Status:   Standing     Number of Occurrences:   1     Order Specific Question:   Level of Care     Answer:   Med Surg [16]     Order Specific Question:   Estimated length of stay     Answer:   More than 2 Midnights     Order Specific Question:   Certification     Answer:   I certify that inpatient services are medically necessary for this patient for a duration of greater than two midnights. See H&P and MD Progress Notes for additional information about the patient's course of treatment.     ED Arrival Information       Expected   -    Arrival   12/18/2023 16:40    Acuity   Emergent              Means of arrival   Ambulance    Escorted by   [facility] ([place]    Service   Hospitalist    Admission type   Emergency              Arrival complaint   fall,back injury             Chief Complaint   Patient presents with    Fall     Patient presents to the ED via EMS and fell while trying to put depends on and fell, denies head strike and c/o right sided back pain, reports hematoma to area        Initial Presentation: 83 y.o. female from assisted living to ED via ems admitted inpatient due to Ambulatory dysfunction/atrial fib with RVR/UTI/Supra therapeutic INR/fracture ribs/Hypomagnesia.   Presented due to pain and bruising of right lower rib region starting the morning of arrival after a fall. + back pain.  On antibiotics for UTI. On Coumadin.    Fall about 2 weeks ago with scalp laceration.  Increased weakness  last 2 days.   On exam: mucous membranes very dry.   Tachycardia.  Rhythm irregular.   Monitor atrial fib with RVR. Old bruising noted along left thigh and left lower leg region.   Superficial abrasion and some ecchymosis noted to the right lower rib region in the posterior axillary line that is tender to palpation.  generalized weakness.  INR 5.82.   UA innumerable bacteria and WBC. Mg 1.4.   wbc 10.57.  H&H 8.7/28.6.   ct showed fracture rib with edema in costochondral musculature.   Chronic fractures.  In the ED given IVF bolus, benadryl pre ct, Mg, Dilaudid and started on ceftriaxone.  Plan includes:   PT/OT.  Hold Coumadin.  Replete mg.  Telemetry.  IV lopressor for sustained HR > 120.   Consult cardiology.   Follow urine culture.   Continue ceftriaxone.   Incentive spirometry.   Oxygen sats.  Replete electrolytes and trend.     Date: 12/19/23    Day 2:   heart rate remains irregular and on tachy side.   Patient feels better than on admission.   On exam:  CV tachycardia, irreg, irreg.   Wbc 10.31.  H&H 10.2/31.6.   continue pain control, monitor oxygen sats, incentive spirometry.  Repletion of electrolytes as needed.  Urine culture pending.  Continue ceftriaxone.    Continue telemetry.     Diltiazem restarted.      ED Triage Vitals   Temperature Pulse Respirations Blood Pressure SpO2   12/18/23 1648 12/18/23 1648 12/18/23 1648 12/18/23 1648 12/18/23 1648   98.4 °F (36.9 °C) (!) 119 17 160/93 94 %      Temp Source Heart Rate Source Patient Position - Orthostatic VS BP Location FiO2 (%)   12/18/23 1648 12/18/23 1648 12/18/23 1900 12/18/23 1900 --   Temporal Monitor Lying Right arm       Pain Score       12/19/23 0004       6          Wt Readings from Last 1 Encounters:   12/20/23 56 kg (123 lb 7.3 oz)     Additional Vital Signs:   12/19/23 13:07:48 -- 101 -- 113/73 86 94 % -- --   12/19/23 1215 -- 97 23 Abnormal  -- -- 94 % -- --   12/19/23 1115 -- 92 27 Abnormal  -- -- 93 % -- --   12/19/23 1000 -- 111 Abnormal   39 Abnormal  -- -- -- -- --   12/19/23 0845 -- 95 26 Abnormal  -- -- 90 % -- --   12/19/23 0700 -- 90 24 Abnormal  -- -- 93 % -- --   12/19/23 0545 -- 91 22 109/66 81 94 % -- --   12/19/23 0245 -- 83 -- -- -- 95 % -- --   12/19/23 0130 -- 83 -- -- -- 94 % -- --   12/19/23 0030 -- 87 -- -- -- 95 % -- --   12/18/23 2345 98.2 °F (36.8 °C) 91 21 130/62 87 94 % -- --   12/18/23 2100 -- 90 18 114/60 77 92 % None (Room air) Lying   12/18/23 20:01:02 -- 95 18 126/72 -- 92 % None (Room air) --   12/18/23 1945 -- 107 Abnormal  17 106/59 72 94 % None (Room air) Lying   12/18/23 1900 -- -- -- 154/88 108 -- -- Lying   12/18/23 18:13:03 -- -- -- 143/84 -- -- -- --   12/18/23 1803 -- 108 Abnormal  -- -- -- 93 % -- --   12/18/23 1800 -- 112 Abnormal  -- -- -- 92 %       Pertinent Labs/Diagnostic Test Results:   CT head without contrast   Final Result by Kishor Garland DO (12/18 1857)      CT spine cervical without contrast   Final Result by Kishor Garland DO (12/18 1911)   No cervical spine fracture or traumatic malalignment.                  Workstation performed: [id number]         CT chest abdomen pelvis w contrast   Final Result by Kishor Garland DO (12/18 2010)   Addendum (preliminary) 1 of 1 by Kishor Garland DO (12/18 2010)   ADDENDUM:   I personally discussed this study with DON KRAMER on 12/18/2023 8:10    PM.            Final   Acute fractures of right posterior and posterolateral 10th rib with associated edema in the costochondral musculature. No pneumothorax, pleural effusion, or pulmonary contusion.      Numerous small pulmonary nodules, measuring up to 5 mm, stable from January 2023. Long-term stability is reassuring for benignity. Recommend follow-up in January 2025 to complete 2 years of observation.      Large left inguinal hernia containing a long segment of descending colon and proximal sigmoid, without evidence of obstruction or incarceration.      Several chronic fractures described above.       Attempting to contact referring physician.         Workstation performed: [id number]           12/18/23 ecg Irregularly irregular rhythm, rate 110, normal axis, normal intervals,   no acute ST elevations noted, T wave inversions noted in lead V5 and V6   suggesting lateral ischemic changes compared to previous EKG, atrial   fibrillation with rapid ventricular response noted that is unchanged from   previous study     Results from last 7 days   Lab Units 12/18/23  1818   SARS-COV-2  Negative     Results from last 7 days   Lab Units 12/20/23  0508 12/19/23  0543 12/18/23  1713   WBC Thousand/uL 9.27 10.31* 10.57*   HEMOGLOBIN g/dL 9.2* 10.2* 8.7*   HEMATOCRIT % 28.8* 31.6* 28.6*   PLATELETS Thousands/uL 247 279 263   NEUTROS ABS Thousands/µL 7.57 8.44* 8.67*     Results from last 7 days   Lab Units 12/20/23  0508 12/19/23  0543 12/18/23  1713   SODIUM mmol/L 132* 134* 132*   POTASSIUM mmol/L 3.9 4.2 3.9   CHLORIDE mmol/L 94* 94* 91*   CO2 mmol/L 31 33* 33*   ANION GAP mmol/L 7 7 8   BUN mg/dL 10 12 19   CREATININE mg/dL 0.78 0.89 1.16   EGFR ml/min/1.73sq m 70 60 43   CALCIUM mg/dL 8.7 9.2 9.3   MAGNESIUM mg/dL  --  2.0 1.4*     Results from last 7 days   Lab Units 12/20/23  0508 12/18/23  1713   AST U/L 45* 59*   ALT U/L 31 35   ALK PHOS U/L 102 93   TOTAL PROTEIN g/dL 6.0* 6.4   ALBUMIN g/dL 3.5 3.9   TOTAL BILIRUBIN mg/dL 0.45 0.43     Results from last 7 days   Lab Units 12/20/23  0508 12/19/23  0543 12/18/23  1713   GLUCOSE RANDOM mg/dL 89 78 75     Results from last 7 days   Lab Units 12/18/23  1910 12/18/23  1713   HS TNI 0HR ng/L  --  16   HS TNI 2HR ng/L 20  --    HSTNI D2 ng/L 4  --      Results from last 7 days   Lab Units 12/20/23  0508 12/19/23  0543 12/18/23  1822   PROTIME seconds 45.7* 45.2* 52.7*   INR  4.84* 4.77* 5.82*   PTT seconds  --  57* 53*     Results from last 7 days   Lab Units 12/18/23 1818   CLARITY UA  Clear   COLOR UA  Light Yellow   SPEC GRAV UA  <1.005*   PH UA  8.0   GLUCOSE UA  mg/dl Negative   KETONES UA mg/dl Negative   BLOOD UA  Small*   PROTEIN UA mg/dl 30 (1+)*   NITRITE UA  Negative   BILIRUBIN UA  Negative   UROBILINOGEN UA (BE) mg/dl <2.0   LEUKOCYTES UA  Large*   WBC UA /hpf Innumerable*   RBC UA /hpf 1-2   BACTERIA UA /hpf Innumerable*   EPITHELIAL CELLS WET PREP /hpf Occasional     Results from last 7 days   Lab Units 12/18/23  1818   INFLUENZA A PCR  Negative   INFLUENZA B PCR  Negative   RSV PCR  Negative       ED Treatment:   Medication Administration from 12/18/2023 1640 to 12/19/2023 1253         Date/Time Order Dose Route Action Comments     12/18/2023 1715 EST sodium chloride 0.9 % bolus 500 mL 500 mL Intravenous New Bag --     12/18/2023 1711 EST diphenhydrAMINE (BENADRYL) injection 50 mg 50 mg Intravenous Given --     12/18/2023 1911 EST magnesium sulfate 2 g/50 mL IVPB (premix) 2 g 2 g Intravenous New Bag --     12/18/2023 1955 EST cefTRIAXone (ROCEPHIN) IVPB (premix in dextrose) 1,000 mg 50 mL 1,000 mg Intravenous New Bag --     12/18/2023 1955 EST metoprolol (LOPRESSOR) injection 5 mg 5 mg Intravenous Given --     12/18/2023 2033 EST HYDROmorphone HCl (DILAUDID) injection 0.2 mg 0.2 mg Intravenous Given --     12/19/2023 0832 EST lidocaine (LIDODERM) 5 % patch 1 patch 1 patch Topical Medication Applied --     12/19/2023 0831 EST lidocaine (LIDODERM) 5 % patch 1 patch 1 patch Topical Patch Removed --     12/18/2023 2342 EST lidocaine (LIDODERM) 5 % patch 1 patch 1 patch Topical Medication Applied --     12/19/2023 0822 EST atorvastatin (LIPITOR) tablet 80 mg 80 mg Oral Given --     12/19/2023 0823 EST calcium carbonate-vitamin D 500 mg-5 mcg tablet 1 tablet 1 tablet Oral Given --     12/19/2023 0822 EST lamoTRIgine (LaMICtal) tablet 100 mg 100 mg Oral Given --     12/18/2023 2342 EST lamoTRIgine (LaMICtal) tablet 100 mg 100 mg Oral Given --     12/19/2023 0610 EST levothyroxine tablet 75 mcg 75 mcg Oral Given --     12/19/2023 0823 EST multivitamin stress formula  tablet 1 tablet 1 tablet Oral Given --     12/18/2023 2342 EST montelukast (SINGULAIR) tablet 10 mg 10 mg Oral Given --     12/19/2023 0610 EST pantoprazole (PROTONIX) EC tablet 40 mg 40 mg Oral Given --     12/19/2023 0822 EST oxybutynin (DITROPAN) tablet 5 mg 5 mg Oral Given --     12/18/2023 2342 EST oxybutynin (DITROPAN) tablet 5 mg 5 mg Oral Given --     12/19/2023 0822 EST potassium chloride (K-DUR,KLOR-CON) CR tablet 10 mEq 10 mEq Oral Given --     12/18/2023 2342 EST sertraline (ZOLOFT) tablet 100 mg 100 mg Oral Given --     12/19/2023 1015 EST Fluticasone Furoate-Vilanterol 100-25 mcg/actuation 1 puff 1 puff Inhalation Given --     12/19/2023 1015 EST umeclidinium 62.5 mcg/actuation inhaler AEPB 1 puff 1 puff Inhalation Given --          Past Medical History:   Diagnosis Date    Anemia     Anxiety disorder, unspecified     Anxiety disorder, unspecified     Arthritis     Asthma     Cardiac arrest, cause unspecified (HCC)     Chronic obstructive pulmonary disease, unspecified (HCC)     Constipation     Essential (primary) hypertension     GERD (gastroesophageal reflux disease)     Hypo-osmolality and hyponatremia     Hypothyroidism     Nontraumatic hematoma of soft tissue     Other seizures (HCC)     Persistent atrial fibrillation (HCC)     Unspecified urinary incontinence      Present on Admission:   Epilepsy ([facility])      Admitting Diagnosis: UTI (urinary tract infection) [N39.0]  Anemia [D64.9]  Back injury [S39.92XA]  Elevated INR [R79.1]  Pulmonary nodules [R91.8]  Atrial fibrillation with rapid ventricular response (HCC) [I48.91]  Recurrent falls [R29.6]  Traumatic ecchymosis of rib, initial encounter [S20.20XA]  Fracture of one rib, right side, initial encounter for closed fracture [S22.31XA]  Age/Sex: 83 y.o. female  Admission Orders: 12/18/23 2027 inpatient   Scheduled Medications:  ascorbic acid, 500 mg, Oral, Daily  atorvastatin, 80 mg, Oral, Daily  calcium carbonate-vitamin D, 1 tablet, Oral, Daily  With Breakfast  cefTRIAXone, 1,000 mg, Intravenous, Q24H  diltiazem, 180 mg, Oral, Daily  Fluticasone Furoate-Vilanterol, 1 puff, Inhalation, Daily   And  umeclidinium, 1 puff, Inhalation, Daily  lamoTRIgine, 100 mg, Oral, BID  levothyroxine, 75 mcg, Oral, Daily  lidocaine, 1 patch, Topical, Daily  montelukast, 10 mg, Oral, HS  multivitamin stress formula, 1 tablet, Oral, Daily  oxybutynin, 5 mg, Oral, TID  pantoprazole, 40 mg, Oral, Daily Before Breakfast  potassium chloride, 10 mEq, Oral, Daily  sertraline, 100 mg, Oral, HS    Continuous IV Infusions: none      PRN Meds: not used.   acetaminophen, 650 mg, Oral, Q6H PRN  albuterol, 2 puff, Inhalation, Q6H PRN  bisacodyl, 10 mg, Rectal, Daily PRN  oxyCODONE, 5 mg, Oral, Q6H PRN  oxyCODONE, 2.5 mg, Oral, Q6H PRN  senna, 1 tablet, Oral, Daily PRN    Seizure precautions  Oxygen to keep sat > 92%  Telemetry         Network Utilization Review Department  ATTENTION: Please call with any questions or concerns to [phone] and carefully listen to the prompts so that you are directed to the right person. All voicemails are confidential.   For Discharge needs, contact Care Management DC Support Team at [phone] opt. 2  Send all requests for admission clinical reviews, approved or denied determinations and any other requests to dedicated fax number below belonging to the [facility] where the patient is receiving treatment. List of dedicated fax numbers for the Facilities:  FACILITY NAME UR FAX NUMBER   ADMISSION DENIALS (Administrative/Medical Necessity) [phone]   DISCHARGE SUPPORT TEAM (NETWORK) [phone]   PARENT CHILD HEALTH (Maternity/NICU/Pediatrics) [phone]   [facility] [phone]   [facility] [phone]   [facility] [phone]   [facility] [phone]   [facility] [phone]   [facility]  [phone]   [facility] [phone]   [facility] [phone]   [facility] [phone]   [facility] [phone]   [facility] [phone]

## 2023-12-19 NOTE — ED NOTES
Linens changed and patient repositioned.      Terri Matute, RN  12/19/23 8609    
[FreeTextEntry1] : Misty is here for the F/u\par She is doing well . no other events.\par She is happy and gained few pounds\par works in the same place. Sometimes coming home is 11 pm\par Her usual schedule is to go to bed around 12 and getting up around 8 am and taking her brother to the bus and coming back and taking a nap for couple of hours.\par She denies having had a seizure\par Her mood and sleep are good\par No tremor\par No spinal and joint pain\par Saw her PMD around September. \par She did have her blood test done that shows normal CBC, CMP and Vit D\par LGN level was 14

## 2023-12-19 NOTE — ASSESSMENT & PLAN NOTE
CT chest   Numerous small pulmonary nodules, measuring up to 5 mm, stable from January 2023. Long-term stability is reassuring for benignity. Recommend follow-up in January 2025 to complete 2 years of observation.

## 2023-12-19 NOTE — ASSESSMENT & PLAN NOTE
Presents from facility after sliding out of her chair onto the right side. Pain to right lower rib region. Denies hitting head or having LOC. Fall unwitnessed.   Chronic ambulatory difficulty.   Admission at [facility] under trauma service in September 2023 after mechanical fall with traumatic hematoma of her left shoulder.  [facility]   Worsening weakness past couple of days in the setting of UTI   CT head and CT cervical spine negative   CT chest abdomen pelvis pending   Acute fractures of right posterior and posterolateral 10th rib with associated edema in the costochondral musculature. No pneumothorax, pleural effusion, or pulmonary contusion.   Large left inguinal hernia containing a long segment of descending colon and proximal sigmoid, without evidence of obstruction or incarceration.   Fall precautions   Consult PT/OT and CM

## 2023-12-20 LAB
ALBUMIN SERPL BCP-MCNC: 3.5 G/DL (ref 3.5–5)
ALP SERPL-CCNC: 102 U/L (ref 34–104)
ALT SERPL W P-5'-P-CCNC: 31 U/L (ref 7–52)
ANION GAP SERPL CALCULATED.3IONS-SCNC: 7 MMOL/L
AST SERPL W P-5'-P-CCNC: 45 U/L (ref 13–39)
BACTERIA UR CULT: ABNORMAL
BASOPHILS # BLD AUTO: 0.05 THOUSANDS/ÂΜL (ref 0–0.1)
BASOPHILS NFR BLD AUTO: 1 % (ref 0–1)
BILIRUB SERPL-MCNC: 0.45 MG/DL (ref 0.2–1)
BUN SERPL-MCNC: 10 MG/DL (ref 5–25)
CALCIUM SERPL-MCNC: 8.7 MG/DL (ref 8.4–10.2)
CHLORIDE SERPL-SCNC: 94 MMOL/L (ref 96–108)
CO2 SERPL-SCNC: 31 MMOL/L (ref 21–32)
CREAT SERPL-MCNC: 0.78 MG/DL (ref 0.6–1.3)
EOSINOPHIL # BLD AUTO: 0.16 THOUSAND/ÂΜL (ref 0–0.61)
EOSINOPHIL NFR BLD AUTO: 2 % (ref 0–6)
ERYTHROCYTE [DISTWIDTH] IN BLOOD BY AUTOMATED COUNT: 16.8 % (ref 11.6–15.1)
GFR SERPL CREATININE-BSD FRML MDRD: 70 ML/MIN/1.73SQ M
GLUCOSE SERPL-MCNC: 89 MG/DL (ref 65–140)
HCT VFR BLD AUTO: 28.8 % (ref 34.8–46.1)
HGB BLD-MCNC: 9.2 G/DL (ref 11.5–15.4)
IMM GRANULOCYTES # BLD AUTO: 0.1 THOUSAND/UL (ref 0–0.2)
IMM GRANULOCYTES NFR BLD AUTO: 1 % (ref 0–2)
INR PPP: 4.84 (ref 0.84–1.19)
LYMPHOCYTES # BLD AUTO: 0.71 THOUSANDS/ÂΜL (ref 0.6–4.47)
LYMPHOCYTES NFR BLD AUTO: 8 % (ref 14–44)
MCH RBC QN AUTO: 28.8 PG (ref 26.8–34.3)
MCHC RBC AUTO-ENTMCNC: 31.9 G/DL (ref 31.4–37.4)
MCV RBC AUTO: 90 FL (ref 82–98)
MONOCYTES # BLD AUTO: 0.68 THOUSAND/ÂΜL (ref 0.17–1.22)
MONOCYTES NFR BLD AUTO: 7 % (ref 4–12)
NEUTROPHILS # BLD AUTO: 7.57 THOUSANDS/ÂΜL (ref 1.85–7.62)
NEUTS SEG NFR BLD AUTO: 81 % (ref 43–75)
NRBC BLD AUTO-RTO: 0 /100 WBCS
PLATELET # BLD AUTO: 247 THOUSANDS/UL (ref 149–390)
PMV BLD AUTO: 8.8 FL (ref 8.9–12.7)
POTASSIUM SERPL-SCNC: 3.9 MMOL/L (ref 3.5–5.3)
PROT SERPL-MCNC: 6 G/DL (ref 6.4–8.4)
PROTHROMBIN TIME: 45.7 SECONDS (ref 11.6–14.5)
RBC # BLD AUTO: 3.19 MILLION/UL (ref 3.81–5.12)
SODIUM SERPL-SCNC: 132 MMOL/L (ref 135–147)
WBC # BLD AUTO: 9.27 THOUSAND/UL (ref 4.31–10.16)

## 2023-12-20 PROCEDURE — 99232 SBSQ HOSP IP/OBS MODERATE 35: CPT | Performed by: HOSPITALIST

## 2023-12-20 PROCEDURE — 85025 COMPLETE CBC W/AUTO DIFF WBC: CPT | Performed by: HOSPITALIST

## 2023-12-20 PROCEDURE — 80053 COMPREHEN METABOLIC PANEL: CPT | Performed by: HOSPITALIST

## 2023-12-20 PROCEDURE — 85610 PROTHROMBIN TIME: CPT | Performed by: HOSPITALIST

## 2023-12-20 RX ORDER — METOPROLOL SUCCINATE 25 MG/1
25 TABLET, EXTENDED RELEASE ORAL DAILY
Status: DISCONTINUED | OUTPATIENT
Start: 2023-12-20 | End: 2023-12-21 | Stop reason: HOSPADM

## 2023-12-20 RX ADMIN — UMECLIDINIUM 1 PUFF: 62.5 AEROSOL, POWDER ORAL at 09:37

## 2023-12-20 RX ADMIN — CEFTRIAXONE 1000 MG: 1 INJECTION, SOLUTION INTRAVENOUS at 20:35

## 2023-12-20 RX ADMIN — LEVOTHYROXINE SODIUM 75 MCG: 75 TABLET ORAL at 05:52

## 2023-12-20 RX ADMIN — ACETAMINOPHEN 650 MG: 325 TABLET, FILM COATED ORAL at 21:41

## 2023-12-20 RX ADMIN — DILTIAZEM HYDROCHLORIDE 180 MG: 180 CAPSULE, EXTENDED RELEASE ORAL at 09:36

## 2023-12-20 RX ADMIN — POTASSIUM CHLORIDE 10 MEQ: 750 TABLET, EXTENDED RELEASE ORAL at 09:36

## 2023-12-20 RX ADMIN — OXYBUTYNIN CHLORIDE 5 MG: 5 TABLET ORAL at 17:15

## 2023-12-20 RX ADMIN — SERTRALINE HYDROCHLORIDE 100 MG: 50 TABLET ORAL at 21:41

## 2023-12-20 RX ADMIN — METOPROLOL SUCCINATE 25 MG: 25 TABLET, EXTENDED RELEASE ORAL at 09:43

## 2023-12-20 RX ADMIN — ACETAMINOPHEN 650 MG: 325 TABLET, FILM COATED ORAL at 06:03

## 2023-12-20 RX ADMIN — B-COMPLEX W/ C & FOLIC ACID TAB 1 TABLET: TAB at 09:36

## 2023-12-20 RX ADMIN — LAMOTRIGINE 100 MG: 100 TABLET ORAL at 17:15

## 2023-12-20 RX ADMIN — LAMOTRIGINE 100 MG: 100 TABLET ORAL at 09:36

## 2023-12-20 RX ADMIN — FLUTICASONE FUROATE AND VILANTEROL TRIFENATATE 1 PUFF: 100; 25 POWDER RESPIRATORY (INHALATION) at 09:37

## 2023-12-20 RX ADMIN — ATORVASTATIN CALCIUM 80 MG: 40 TABLET, FILM COATED ORAL at 09:36

## 2023-12-20 RX ADMIN — PANTOPRAZOLE SODIUM 40 MG: 40 TABLET, DELAYED RELEASE ORAL at 05:52

## 2023-12-20 RX ADMIN — OXYBUTYNIN CHLORIDE 5 MG: 5 TABLET ORAL at 21:41

## 2023-12-20 RX ADMIN — Medication 1 TABLET: at 09:36

## 2023-12-20 RX ADMIN — OXYBUTYNIN CHLORIDE 5 MG: 5 TABLET ORAL at 09:36

## 2023-12-20 RX ADMIN — LIDOCAINE 1 PATCH: 700 PATCH TOPICAL at 09:35

## 2023-12-20 RX ADMIN — OXYCODONE HYDROCHLORIDE AND ACETAMINOPHEN 500 MG: 500 TABLET ORAL at 09:36

## 2023-12-20 RX ADMIN — MONTELUKAST 10 MG: 10 TABLET, FILM COATED ORAL at 21:41

## 2023-12-20 NOTE — PLAN OF CARE
Problem: Potential for Falls  Goal: Patient will remain free of falls  Description: INTERVENTIONS:  - Educate patient/family on patient safety including physical limitations  - Instruct patient to call for assistance with activity   - Consult OT/PT to assist with strengthening/mobility   - Keep Call bell within reach  - Keep bed low and locked with side rails adjusted as appropriate  - Keep care items and personal belongings within reach  - Initiate and maintain comfort rounds  - Make Fall Risk Sign visible to staff  - Offer Toileting every 2 Hours, in advance of need  - Initiate/Maintain 2alarm  - Obtain necessary fall risk management equipment: 2  - Apply yellow socks and bracelet for high fall risk patients  - Consider moving patient to room near nurses station  Outcome: Progressing     Problem: Prexisting or High Potential for Compromised Skin Integrity  Goal: Skin integrity is maintained or improved  Description: INTERVENTIONS:  - Identify patients at risk for skin breakdown  - Assess and monitor skin integrity  - Assess and monitor nutrition and hydration status  - Monitor labs   - Assess for incontinence   - Turn and reposition patient  - Assist with mobility/ambulation  - Relieve pressure over bony prominences  - Avoid friction and shearing  - Provide appropriate hygiene as needed including keeping skin clean and dry  - Evaluate need for skin moisturizer/barrier cream  - Collaborate with interdisciplinary team   - Patient/family teaching  - Consider wound care consult   Outcome: Progressing     Problem: GENITOURINARY - ADULT  Goal: Maintains or returns to baseline urinary function  Description: INTERVENTIONS:  - Assess urinary function  - Encourage oral fluids to ensure adequate hydration if ordered  - Administer IV fluids as ordered to ensure adequate hydration  - Administer ordered medications as needed  - Offer frequent toileting  - Follow urinary retention protocol if ordered  Outcome:  Progressing  Goal: Absence of urinary retention  Description: INTERVENTIONS:  - Assess patient's ability to void and empty bladder  - Monitor I/O  - Bladder scan as needed  - Discuss with physician/AP medications to alleviate retention as needed  - Discuss catheterization for long term situations as appropriate  Outcome: Progressing  Goal: Urinary catheter remains patent  Description: INTERVENTIONS:  - Assess patency of urinary catheter  - If patient has a chronic wang, consider changing catheter if non-functioning  - Follow guidelines for intermittent irrigation of non-functioning urinary catheter  Outcome: Progressing     Problem: METABOLIC, FLUID AND ELECTROLYTES - ADULT  Goal: Electrolytes maintained within normal limits  Description: INTERVENTIONS:  - Monitor labs and assess patient for signs and symptoms of electrolyte imbalances  - Administer electrolyte replacement as ordered  - Monitor response to electrolyte replacements, including repeat lab results as appropriate  - Instruct patient on fluid and nutrition as appropriate  Outcome: Progressing  Goal: Fluid balance maintained  Description: INTERVENTIONS:  - Monitor labs   - Monitor I/O and WT  - Instruct patient on fluid and nutrition as appropriate  - Assess for signs & symptoms of volume excess or deficit  Outcome: Progressing  Goal: Glucose maintained within target range  Description: INTERVENTIONS:  - Monitor Blood Glucose as ordered  - Assess for signs and symptoms of hyperglycemia and hypoglycemia  - Administer ordered medications to maintain glucose within target range  - Assess nutritional intake and initiate nutrition service referral as needed  Outcome: Progressing     Problem: SKIN/TISSUE INTEGRITY - ADULT  Goal: Skin Integrity remains intact(Skin Breakdown Prevention)  Description: Assess:  -Perform [place] assessment every 2  -Clean and moisturize skin every 2  -Inspect skin when repositioning, toileting, and assisting with ADLS  -Assess under  medical devices such as 2 every 2  -Assess extremities for adequate circulation and sensation     Bed Management:  -Have minimal linens on bed & keep smooth, unwrinkled  -Change linens as needed when moist or perspiring  -Avoid sitting or lying in one position for more than 2 hours while in bed  -Keep HOB at 2degrees     Toileting:  -Offer bedside commode  -Assess for incontinence every 2  -Use incontinent care products after each incontinent episode such as 2    Activity:  -Mobilize patient 2 times a day  -Encourage activity and walks on unit  -Encourage or provide ROM exercises   -Turn and reposition patient every 2 Hours  -Use appropriate equipment to lift or move patient in bed  -Instruct/ Assist with weight shifting every 2 when out of bed in chair  -Consider limitation of chair time 2 hour intervals    Skin Care:  -Avoid use of baby powder, tape, friction and shearing, hot water or constrictive clothing  -Relieve pressure over bony prominences using 2  -Do not massage red bony areas    Next Steps:  -Teach patient strategies to minimize risks such as 2   -Consider consults to  interdisciplinary teams such as 2  Outcome: Progressing  Goal: Incision(s), wounds(s) or drain site(s) healing without S/S of infection  Description: INTERVENTIONS  - Assess and document dressing, incision, wound bed, drain sites and surrounding tissue  - Provide patient and family education  - Perform skin care/dressing changes every 2  Outcome: Progressing  Goal: Pressure injury heals and does not worsen  Description: Interventions:  - Implement low air loss mattress or specialty surface (Criteria met)  - Apply silicone foam dressing  - Instruct/assist with weight shifting every 2 minutes when in chair   - Limit chair time to 2 hour intervals  - Use special pressure reducing interventions such as 2 when in chair   - Apply fecal or urinary incontinence containment device   - Perform passive or active ROM every 2  - Turn and reposition  patient & offload bony prominences every 2 hours   - Utilize friction reducing device or surface for transfers   - Consider consults to  interdisciplinary teams such as 2  - Use incontinent care products after each incontinent episode such as 2  - Consider nutrition services referral as needed  Outcome: Progressing     Problem: MUSCULOSKELETAL - ADULT  Goal: Maintain or return mobility to safest level of function  Description: INTERVENTIONS:  - Assess patient's ability to carry out ADLs; assess patient's baseline for ADL function and identify physical deficits which impact ability to perform ADLs (bathing, care of mouth/teeth, toileting, grooming, dressing, etc.)  - Assess/evaluate cause of self-care deficits   - Assess range of motion  - Assess patient's mobility  - Assess patient's need for assistive devices and provide as appropriate  - Encourage maximum independence but intervene and supervise when necessary  - Involve family in performance of ADLs  - Assess for home care needs following discharge   - Consider OT consult to assist with ADL evaluation and planning for discharge  - Provide patient education as appropriate  Outcome: Progressing  Goal: Maintain proper alignment of affected body part  Description: INTERVENTIONS:  - Support, maintain and protect limb and body alignment  - Provide patient/ family with appropriate education  Outcome: Progressing

## 2023-12-20 NOTE — ASSESSMENT & PLAN NOTE
Presents from facility after sliding out of her chair onto the right side. Pain to right lower rib region. Denies hitting head or having LOC. Fall unwitnessed.   Chronic ambulatory difficulty.   Admission at [facility] under trauma service in September 2023 after mechanical fall with traumatic hematoma of her left shoulder.  [facility]   Worsening weakness past couple of days in the setting of UTI   CT head and CT cervical spine negative   CT chest abdomen pelvis   Acute fractures of right posterior and posterolateral 10th rib with associated edema in the costochondral musculature. No pneumothorax, pleural effusion, or pulmonary contusion.   Large left inguinal hernia containing a long segment of descending colon and proximal sigmoid, without evidence of obstruction or incarceration.   Fall precautions   Consult PT/OT and CM

## 2023-12-20 NOTE — ASSESSMENT & PLAN NOTE
Noted to be in Afib with RVR (HR into the 120s)  Home regimen   Diltiazem 180 mg daily   Coumadin (Hold to supratherapeutic INR)  Mag 1.4/Potassium 3.9   Replete magnesium.   IV lopressor 5 mg prn for HR > 120 sustained.   Start on metoprolol  Telemetry

## 2023-12-20 NOTE — PROGRESS NOTES
[facility]  Progress Note  Name: Apryl Casas I  MRN: [id number]  Unit/Bed#: MS 326Leanne I Date of Admission: 12/18/2023   Date of Service: 12/20/2023 I Hospital Day: 2    Assessment/Plan   Pulmonary nodules  Assessment & Plan  CT chest   Numerous small pulmonary nodules, measuring up to 5 mm, stable from January 2023. Long-term stability is reassuring for benignity. Recommend follow-up in January 2025 to complete 2 years of observation.     Closed fracture of multiple ribs of right side  Assessment & Plan  CT chest   Acute fractures of right posterior and posterolateral 10th rib with associated edema in the costochondral musculature. No pneumothorax, pleural effusion, or pulmonary contusion.   Manage pain   IS   Rib protocol     Hypomagnesemia  Assessment & Plan  Magnesium 1.4   Denies N/V/D   Replete   Continue to monitor     UTI (urinary tract infection)  Assessment & Plan  Diagnosed with UTI outpatient. Prescribed bactrim 12/14-12/24.   Urine sample (12/18)  UA: large amount of leuks and negative nitrite   Micro: Large amount of wbc and bacteria   Will transition to IV Ceftriaxone   Urine culture - kleb pna , await sensitivities.    Atrial fibrillation with rapid ventricular response (HCC)  Assessment & Plan  Noted to be in Afib with RVR (HR into the 120s)  Home regimen   Diltiazem 180 mg daily   Coumadin (Hold to supratherapeutic INR)  Mag 1.4/Potassium 3.9   Replete magnesium.   IV lopressor 5 mg prn for HR > 120 sustained.   Start on metoprolol  Telemetry     Supratherapeutic INR  Assessment & Plan  On Coumadin due to history of AVR and Afib   INR 5.82   INR goal 2.5-3.5  Home regimen: coumadin 2.5 mg daily 6 days per week. 3.75 mg on Saturdays.   Hold Coumadin, remains supratherapeutic  Recheck INR daily    Epilepsy (HCC)  Assessment & Plan  Continue home lamictal   Seizure precautions     S/P aortic valve replacement  Assessment & Plan  S/p AVR   Chronically on coumadin.  Held due to INR   INR goal 2.5-3.5     * Ambulatory dysfunction  Assessment & Plan  Presents from facility after sliding out of her chair onto the right side. Pain to right lower rib region. Denies hitting head or having LOC. Fall unwitnessed.   Chronic ambulatory difficulty.   Admission at [facility] under trauma service in 2023 after mechanical fall with traumatic hematoma of her left shoulder.  [facility].   Worsening weakness past couple of days in the setting of UTI   CT head and CT cervical spine negative   CT chest abdomen pelvis   Acute fractures of right posterior and posterolateral 10th rib with associated edema in the costochondral musculature. No pneumothorax, pleural effusion, or pulmonary contusion.   Large left inguinal hernia containing a long segment of descending colon and proximal sigmoid, without evidence of obstruction or incarceration.   Fall precautions   Consult PT/OT and CM             VTE  Prophylaxis:   Pharmacologic: in place  Mechanical VTE Prophylaxis in Place: Yes    Patient Centered Rounds: I have performed bedside rounds with nursing staff today.    Discussions with Specialists or Other Care Team Provider: case management    Education and Discussions with Family / Patient: pt      Current Length of Stay: 2 day(s)    Current Patient Status: Inpatient        Code Status: Level 1 - Full Code    Discharge Plan: Pt will require continued inpatient hospitalization.    Subjective:   Pt feels better   Still with some elevated hr    Patient is seen and examined at bedside.  All other ROS are negative.    Objective:     Vitals:   Temp (24hrs), Av.3 °F (36.8 °C), Min:97.8 °F (36.6 °C), Max:99.2 °F (37.3 °C)    Temp:  [97.8 °F (36.6 °C)-99.2 °F (37.3 °C)] 97.8 °F (36.6 °C)  HR:  [] 102  Resp:  [15-27] 15  BP: (103-143)/() 138/93  SpO2:  [93 %-98 %] 97 %  Body mass index is 24.11 kg/m².     Input and Output Summary (last 24 hours):       Intake/Output Summary (Last  24 hours) at 12/20/2023 1105  Last data filed at 12/20/2023 0940  Gross per 24 hour   Intake 960 ml   Output 200 ml   Net 760 ml       Physical Exam:       GEN: No acute distress, comfortable  HEEENT: No JVD, PERRLA, no scleral icterus  RESP: Lungs clear to auscultation bilaterally  CV: irreg irreg, tachycardia.  ABD: SOFT NON TENDER, POSITIVE BOWEL SOUNDS, NO DISTENTION  PSYCH: CALM  NEURO: Mentation baseline, NO FOCAL DEFICITS  SKIN: NO RASH  EXTREM: NO EDEMA    Additional Data:     Labs:    Results from last 7 days   Lab Units 12/20/23  0508   WBC Thousand/uL 9.27   HEMOGLOBIN g/dL 9.2*   HEMATOCRIT % 28.8*   PLATELETS Thousands/uL 247   NEUTROS PCT % 81*   LYMPHS PCT % 8*   MONOS PCT % 7   EOS PCT % 2     Results from last 7 days   Lab Units 12/20/23  0508   SODIUM mmol/L 132*   POTASSIUM mmol/L 3.9   CHLORIDE mmol/L 94*   CO2 mmol/L 31   BUN mg/dL 10   CREATININE mg/dL 0.78   ANION GAP mmol/L 7   CALCIUM mg/dL 8.7   ALBUMIN g/dL 3.5   TOTAL BILIRUBIN mg/dL 0.45   ALK PHOS U/L 102   ALT U/L 31   AST U/L 45*   GLUCOSE RANDOM mg/dL 89     Results from last 7 days   Lab Units 12/20/23  0508   INR  4.84*                   Lines/Drains:  Invasive Devices       Peripheral Intravenous Line  Duration             Peripheral IV 12/19/23 Distal;Dorsal (posterior);Left Forearm <1 day              Drain  Duration             External Urinary Catheter 1 day                    Telemetry:   Telemetry Orders (From admission, onward)               24 Hour Telemetry Monitoring  Continuous x 24 Hours (Telem)        Question:  Reason for 24 Hour Telemetry  Answer:  Arrhythmias requiring acute medical intervention / PPM or ICD malfunction                        * I Have Reviewed All Lab Data Listed Above.           Imaging:     Results for orders placed during the hospital encounter of 09/22/23    XR Trauma chest portable    Narrative  CHEST    INDICATION:   TRAUMA.    COMPARISON: 1/23/2023    EXAM PERFORMED/VIEWS:  XR CHEST  PORTABLE      FINDINGS:    Heart shadow is enlarged but unchanged from prior exam. Unchanged left-sided chest wall PPM/AICD. Post aVR.    No focal consolidation.  No pneumothorax or pleural effusion.    Median sternotomy changes. No acute osseous abnormality.    Impression  No acute cardiopulmonary disease.            Workstation performed: [id number]    No results found for this or any previous visit.      *I have reviewed all imaging reports listed above      Recent Cultures (last 7 days):     Results from last 7 days   Lab Units 12/18/23  1818   URINE CULTURE  >100,000 cfu/ml Klebsiella pneumoniae*       Last 24 Hours Medication List:   Current Facility-Administered Medications   Medication Dose Route Frequency Provider Last Rate    acetaminophen  650 mg Oral Q6H PRN Geneva Chappell PA-C      albuterol  2 puff Inhalation Q6H PRN Geneva Chappell PA-C      ascorbic acid  500 mg Oral Daily Geneva Chappell PA-C      atorvastatin  80 mg Oral Daily Geneva Chappell PA-C      bisacodyl  10 mg Rectal Daily PRN Geneva Chappell PA-C      calcium carbonate-vitamin D  1 tablet Oral Daily With Breakfast Geneva Chappell PA-C      cefTRIAXone  1,000 mg Intravenous Q24H Geneva Chappell PA-C 1,000 mg (12/19/23 2035)    diltiazem  180 mg Oral Daily Geneva Chappell PA-C      Fluticasone Furoate-Vilanterol  1 puff Inhalation Daily Geneva Chappell PA-C      And    umeclidinium  1 puff Inhalation Daily Geneva Chappell PA-C      lamoTRIgine  100 mg Oral BID Geneva Chappell PA-C      levothyroxine  75 mcg Oral Daily Geneva Chappell PA-C      lidocaine  1 patch Topical Daily Geneva Chappell PA-C      metoprolol succinate  25 mg Oral Daily Gracia Kumar MD      montelukast  10 mg Oral HS Geneva Chappell PA-C      multivitamin stress formula  1 tablet Oral Daily Geneva Chappell PA-C      oxybutynin  5 mg Oral TID Geneva Chappell PA-C      oxyCODONE  5 mg Oral Q6H PRN Geneva Chappell PA-C      oxyCODONE  2.5 mg Oral Q6H PRN Geneva Chappell PA-C      pantoprazole  40 mg Oral Daily Before Breakfast Geneva  KERMIT Chappell      potassium chloride  10 mEq Oral Daily Geneva Chappell PA-C      senna  1 tablet Oral Daily PRN Geneva Chappell PA-C      sertraline  100 mg Oral HS Geneva Chappell PA-C          Today, Patient Was Seen By: Gracia Kumar MD    ** Please Note: Dictation voice to text software may have been used in the creation of this document. **

## 2023-12-20 NOTE — ASSESSMENT & PLAN NOTE
Diagnosed with UTI outpatient. Prescribed bactrim 12/14-12/24.   Urine sample (12/18)  UA: large amount of leuks and negative nitrite   Micro: Large amount of wbc and bacteria   Will transition to IV Ceftriaxone   Urine culture - kleb pna , await sensitivities.

## 2023-12-21 VITALS
TEMPERATURE: 98 F | RESPIRATION RATE: 18 BRPM | HEIGHT: 60 IN | WEIGHT: 128.53 LBS | BODY MASS INDEX: 25.23 KG/M2 | DIASTOLIC BLOOD PRESSURE: 67 MMHG | OXYGEN SATURATION: 95 % | SYSTOLIC BLOOD PRESSURE: 106 MMHG | HEART RATE: 75 BPM

## 2023-12-21 LAB
ALBUMIN SERPL BCP-MCNC: 3.5 G/DL (ref 3.5–5)
ALP SERPL-CCNC: 101 U/L (ref 34–104)
ALT SERPL W P-5'-P-CCNC: 28 U/L (ref 7–52)
ANION GAP SERPL CALCULATED.3IONS-SCNC: 6 MMOL/L
AST SERPL W P-5'-P-CCNC: 36 U/L (ref 13–39)
BASOPHILS # BLD AUTO: 0.06 THOUSANDS/ÂΜL (ref 0–0.1)
BASOPHILS NFR BLD AUTO: 1 % (ref 0–1)
BILIRUB SERPL-MCNC: 0.44 MG/DL (ref 0.2–1)
BUN SERPL-MCNC: 9 MG/DL (ref 5–25)
CALCIUM SERPL-MCNC: 8.8 MG/DL (ref 8.4–10.2)
CHLORIDE SERPL-SCNC: 95 MMOL/L (ref 96–108)
CO2 SERPL-SCNC: 30 MMOL/L (ref 21–32)
CREAT SERPL-MCNC: 0.69 MG/DL (ref 0.6–1.3)
EOSINOPHIL # BLD AUTO: 0.16 THOUSAND/ÂΜL (ref 0–0.61)
EOSINOPHIL NFR BLD AUTO: 2 % (ref 0–6)
ERYTHROCYTE [DISTWIDTH] IN BLOOD BY AUTOMATED COUNT: 16.9 % (ref 11.6–15.1)
GFR SERPL CREATININE-BSD FRML MDRD: 80 ML/MIN/1.73SQ M
GLUCOSE SERPL-MCNC: 85 MG/DL (ref 65–140)
HCT VFR BLD AUTO: 31 % (ref 34.8–46.1)
HGB BLD-MCNC: 10 G/DL (ref 11.5–15.4)
IMM GRANULOCYTES # BLD AUTO: 0.09 THOUSAND/UL (ref 0–0.2)
IMM GRANULOCYTES NFR BLD AUTO: 1 % (ref 0–2)
INR PPP: 0.96 (ref 0.84–1.19)
INR PPP: 3.31 (ref 0.84–1.19)
LYMPHOCYTES # BLD AUTO: 0.79 THOUSANDS/ÂΜL (ref 0.6–4.47)
LYMPHOCYTES NFR BLD AUTO: 9 % (ref 14–44)
MCH RBC QN AUTO: 29.2 PG (ref 26.8–34.3)
MCHC RBC AUTO-ENTMCNC: 32.3 G/DL (ref 31.4–37.4)
MCV RBC AUTO: 90 FL (ref 82–98)
MONOCYTES # BLD AUTO: 0.69 THOUSAND/ÂΜL (ref 0.17–1.22)
MONOCYTES NFR BLD AUTO: 8 % (ref 4–12)
MRSA NOSE QL CULT: NORMAL
NEUTROPHILS # BLD AUTO: 7.02 THOUSANDS/ÂΜL (ref 1.85–7.62)
NEUTS SEG NFR BLD AUTO: 79 % (ref 43–75)
NRBC BLD AUTO-RTO: 0 /100 WBCS
PLATELET # BLD AUTO: 276 THOUSANDS/UL (ref 149–390)
PMV BLD AUTO: 9.1 FL (ref 8.9–12.7)
POTASSIUM SERPL-SCNC: 4.4 MMOL/L (ref 3.5–5.3)
PROT SERPL-MCNC: 6.2 G/DL (ref 6.4–8.4)
PROTHROMBIN TIME: 13.1 SECONDS (ref 11.6–14.5)
PROTHROMBIN TIME: 34.2 SECONDS (ref 11.6–14.5)
RBC # BLD AUTO: 3.43 MILLION/UL (ref 3.81–5.12)
SODIUM SERPL-SCNC: 131 MMOL/L (ref 135–147)
WBC # BLD AUTO: 8.81 THOUSAND/UL (ref 4.31–10.16)

## 2023-12-21 PROCEDURE — 97166 OT EVAL MOD COMPLEX 45 MIN: CPT

## 2023-12-21 PROCEDURE — 80053 COMPREHEN METABOLIC PANEL: CPT | Performed by: HOSPITALIST

## 2023-12-21 PROCEDURE — 85610 PROTHROMBIN TIME: CPT | Performed by: PHYSICIAN ASSISTANT

## 2023-12-21 PROCEDURE — 99222 1ST HOSP IP/OBS MODERATE 55: CPT | Performed by: INTERNAL MEDICINE

## 2023-12-21 PROCEDURE — 85025 COMPLETE CBC W/AUTO DIFF WBC: CPT | Performed by: HOSPITALIST

## 2023-12-21 PROCEDURE — 99239 HOSP IP/OBS DSCHRG MGMT >30: CPT | Performed by: HOSPITALIST

## 2023-12-21 PROCEDURE — 85610 PROTHROMBIN TIME: CPT | Performed by: HOSPITALIST

## 2023-12-21 PROCEDURE — 97163 PT EVAL HIGH COMPLEX 45 MIN: CPT

## 2023-12-21 RX ORDER — BISACODYL 10 MG
10 SUPPOSITORY, RECTAL RECTAL DAILY PRN
Status: DISCONTINUED | OUTPATIENT
Start: 2023-12-21 | End: 2023-12-21 | Stop reason: HOSPADM

## 2023-12-21 RX ORDER — METOPROLOL SUCCINATE 25 MG/1
25 TABLET, EXTENDED RELEASE ORAL DAILY
Qty: 30 TABLET | Refills: 0 | Status: SHIPPED | OUTPATIENT
Start: 2023-12-22

## 2023-12-21 RX ORDER — CEPHALEXIN 500 MG/1
500 CAPSULE ORAL EVERY 6 HOURS SCHEDULED
Qty: 12 CAPSULE | Refills: 0 | Status: SHIPPED | OUTPATIENT
Start: 2023-12-21 | End: 2023-12-24

## 2023-12-21 RX ADMIN — ATORVASTATIN CALCIUM 80 MG: 40 TABLET, FILM COATED ORAL at 08:49

## 2023-12-21 RX ADMIN — OXYBUTYNIN CHLORIDE 5 MG: 5 TABLET ORAL at 08:50

## 2023-12-21 RX ADMIN — Medication 1 TABLET: at 08:50

## 2023-12-21 RX ADMIN — UMECLIDINIUM 1 PUFF: 62.5 AEROSOL, POWDER ORAL at 08:51

## 2023-12-21 RX ADMIN — FLUTICASONE FUROATE AND VILANTEROL TRIFENATATE 1 PUFF: 100; 25 POWDER RESPIRATORY (INHALATION) at 08:51

## 2023-12-21 RX ADMIN — OXYBUTYNIN CHLORIDE 5 MG: 5 TABLET ORAL at 16:32

## 2023-12-21 RX ADMIN — POTASSIUM CHLORIDE 10 MEQ: 750 TABLET, EXTENDED RELEASE ORAL at 08:50

## 2023-12-21 RX ADMIN — METOPROLOL SUCCINATE 25 MG: 25 TABLET, EXTENDED RELEASE ORAL at 08:49

## 2023-12-21 RX ADMIN — PANTOPRAZOLE SODIUM 40 MG: 40 TABLET, DELAYED RELEASE ORAL at 05:48

## 2023-12-21 RX ADMIN — OXYCODONE HYDROCHLORIDE AND ACETAMINOPHEN 500 MG: 500 TABLET ORAL at 08:50

## 2023-12-21 RX ADMIN — LAMOTRIGINE 100 MG: 100 TABLET ORAL at 17:44

## 2023-12-21 RX ADMIN — LAMOTRIGINE 100 MG: 100 TABLET ORAL at 08:47

## 2023-12-21 RX ADMIN — B-COMPLEX W/ C & FOLIC ACID TAB 1 TABLET: TAB at 08:49

## 2023-12-21 RX ADMIN — DILTIAZEM HYDROCHLORIDE 180 MG: 180 CAPSULE, EXTENDED RELEASE ORAL at 08:47

## 2023-12-21 RX ADMIN — LIDOCAINE 1 PATCH: 700 PATCH TOPICAL at 08:49

## 2023-12-21 RX ADMIN — LEVOTHYROXINE SODIUM 75 MCG: 75 TABLET ORAL at 05:47

## 2023-12-21 NOTE — ASSESSMENT & PLAN NOTE
On Coumadin due to history of AVR and Afib   INR 5.82   INR goal 2.5-3.5  Home regimen: coumadin 2.5 mg daily 6 days per week. 3.75 mg on Saturdays.   Resume coumadin at [place].

## 2023-12-21 NOTE — OCCUPATIONAL THERAPY NOTE
Occupational Therapy Evaluation     Patient Name: Apryl Casas  Today's Date: 12/21/2023  Problem List  Principal Problem:    Ambulatory dysfunction  Active Problems:    S/P aortic valve replacement    Epilepsy (HCC)    Supratherapeutic INR    Atrial fibrillation with rapid ventricular response (HCC)    UTI (urinary tract infection)    Hypomagnesemia    Closed fracture of multiple ribs of right side    Pulmonary nodules    Past Medical History  Past Medical History:   Diagnosis Date    Anemia     Anxiety disorder, unspecified     Anxiety disorder, unspecified     Arthritis     Asthma     Cardiac arrest, cause unspecified (HCC)     Chronic obstructive pulmonary disease, unspecified (HCC)     Constipation     Essential (primary) hypertension     GERD (gastroesophageal reflux disease)     Hypo-osmolality and hyponatremia     Hypothyroidism     Nontraumatic hematoma of soft tissue     Other seizures (HCC)     Persistent atrial fibrillation (HCC)     Unspecified urinary incontinence      Past Surgical History  Past Surgical History:   Procedure Laterality Date    APPENDECTOMY      CARDIAC PACEMAKER PLACEMENT      FEMUR FRACTURE SURGERY             12/21/23 1129   OT Last Visit   OT Visit Date 12/21/23   Note Type   Note type Evaluation   Pain Assessment   Pain Assessment Tool 0-10   Pain Score No Pain   Restrictions/Precautions   Weight Bearing Precautions Per Order No   Other Precautions Chair Alarm;Bed Alarm;Cognitive;Fall Risk   Home Living   Type of Home Assisted living  ([address])   Home Layout One level;Performs ADLs on one level;Able to live on main level with bedroom/bathroom;Access   Bathroom Shower/Tub Walk-in shower   Bathroom Toilet Raised   Bathroom Equipment Grab bars in shower;Grab bars around toilet   Bathroom Accessibility Accessible   Home Equipment Life alert;Walker;Wheelchair-manual  (rollator)   Prior Function   Level of [place] Independent with functional mobility;Needs  assistance with IADLS;Independent with ADLs  (pt reports needing stand by assistance for showering 2x/wk)   Lives With Other (Comment)  (facility staff and roommate)   Receives Help From Personal care attendant   IADLs Family/Friend/Other provides transportation;Family/Friend/Other provides meals;Family/Friend/Other provides medication management   Falls in the last 6 months 1 to 4  (3 falls)   Vocational Retired   Lifestyle   Autonomy pta pt was (I) w dressing, (A) for bathing 2x/wk, (A) for IADLs, use of rollator, lives at [facility], family involved   Reciprocal Relationships family   Service to Others retired   Intrinsic Gratification enjoys rummi [employer]   General   Additional Pertinent History epilepsy, a-fib, UTI, fx of ribs   Family/Caregiver Present No   Additional General Comments pt was pleasant   ADL   Where Assessed Chair   Eating Assistance 5  Supervision/Setup   Grooming Assistance 5  Supervision/Setup   UB Bathing Assistance 5  Supervision/Setup   LB Bathing Assistance 4  Minimal Assistance   UB Dressing Assistance 5  Supervision/Setup   LB Dressing Assistance 4  Minimal Assistance   LB Dressing Deficit Thread RLE into underwear;Thread LLE into underwear;Pull up over hips;Increased time to complete;Requires assistive device for steadying   Toileting Assistance  5  Supervision/Setup   Bed Mobility   Additional Comments Pt received OOB in recliner chair upon arrival, pt OOB in recliner at end of IE   Transfers   Sit to Stand 5  Supervision   Additional items Armrests;Increased time required;Verbal cues   Stand to Sit 5  Supervision   Additional items Armrests;Increased time required;Verbal cues   Functional Mobility   Functional Mobility 5  Supervision   Additional Comments x1, short distance household, RW   Additional items Rolling walker   Balance   Static Sitting Good   Dynamic Sitting Fair   Static Standing Fair   Dynamic Standing Fair -   Ambulatory Fair -   Activity Tolerance   Activity Tolerance Patient  tolerated treatment well   Nurse Made Aware RN aware   RUE Assessment   RUE Assessment WFL   LUE Assessment   LUE Assessment WFL   Vision-Basic Assessment   Current Vision Wears glasses only for reading   Psychosocial   Psychosocial (WDL) WDL   Cognition   Overall Cognitive Status Impaired   Arousal/Participation Alert;Cooperative   Attention Attends with cues to redirect   Orientation Level Oriented to person;Oriented to place;Oriented to situation;Disoriented to time   Memory Decreased recall of precautions;Decreased short term memory   Following Commands Follows one step commands with increased time or repetition   Comments impaired recall of events, safety awareness and decreased problem solving   Assessment   Limitation Decreased ADL status;Decreased Safe judgement during ADL;Decreased cognition;Decreased endurance;Decreased self-care trans;Decreased high-level ADLs   Prognosis Good   Assessment Pt is a 83 y.o. female seen for OT evaluation s/p admission to [facility] on 12/18/2023 due to fall. Diagnosed with Ambulatory dysfunction. Personal and env factors supporting pt at time of IE include age, attitude towards recovery, and accessible home environment. Personal and env factors inhibiting engagement in occupations include difficulty completing ADLs and difficulty completing IADLs. Performance deficits that affect the pt’s occupational performance can be seen above. Due to pt's current functional limitations and medical complications pt is functioning below baseline. Pt would benefit from continued skilled OT treatment in order to maximize safety, independence and overall performance with ADLs, functional mobility, functional transfers, and cognition in order to achieve highest level of function.   Goals   Patient Goals to go home   LTG Time Frame 3-7   Long Term Goal GOALS      1. Pt will improve activity tolerance to G for min 30 min txment sessions for increase engagement in functional tasks      2. Pt will  complete bed mobility at a Mod I level w/ G balance/safety demonstrated to decrease caregiver assistance required       3. Pt will complete UB dressing/self care w/ mod I using adaptive device and DME as needed       4. Pt will complete LB dressing/self care w/ mod I using adaptive device and DME as needed      5. Pt will complete toileting w/ mod I w/ G hygiene/thoroughness using DME as needed      6. Pt will improve functional transfers to Mod I on/off all surfaces using DME as needed w/ G balance/safety       7. Pt will improve functional mobility during ADL/IADL/leisure tasks to Mod I using DME as needed w/ G balance/safety       8. Pt will be attentive 100% of the time during ongoing cognitive assessment w/ G participation to assist w/ safe d/c planning/recommendations      9. Pt will demonstrate G carryover of pt/caregiver education and training as appropriate w/o cues w/ good tolerance to increase safety during functional tasks      10. Pt will demonstrate 100% carryover of energy conservation techniques t/o functional I/ADL/leisure tasks w/o cues s/p skilled education to increase endurance during functional tasks   Plan   Treatment Interventions ADL retraining;Functional transfer training;Endurance training;Cognitive reorientation;Patient/family training;Equipment evaluation/education;Compensatory technique education;Continued evaluation;Energy conservation;Activityengagement   Goal Expiration Date 12/28/23   OT Treatment Day 0   OT Frequency 2-3x/wk   Discharge Recommendation   Rehab Resource Intensity Level, OT III (Minimum Resource Intensity)  (HHOT at facility)   AM-PAC Daily Activity Inpatient   Lower Body Dressing 3   Bathing 3   Toileting 3   Upper Body Dressing 3   Grooming 3   Eating 4   Daily Activity Raw Score 19   Daily Activity Standardized Score (Calc for Raw Score >=11) 40.22   AM-PAC Applied Cognition Inpatient   Following a Speech/Presentation 3   Understanding Ordinary Conversation 4    Taking Medications 3   Remembering Where Things Are Placed or Put Away 3   Remembering List of 4-5 Errands 2   Taking Care of Complicated Tasks 2   Applied Cognition Raw Score 17   Applied Cognition Standardized Score 36.52   End of Consult   Education Provided Yes   Patient Position at End of Consult Bedside chair;Bed/Chair alarm activated;All needs within reach   Nurse Communication Nurse aware of consult        The patient's raw score on the AM-PAC Daily Activity Inpatient Short Form is 19. A raw score of greater than or equal to 19 suggests the patient may benefit from discharge to home. Please refer to the recommendation of the Occupational Therapist for safe discharge planning.    Yenni Camacho, OTR/L

## 2023-12-21 NOTE — PLAN OF CARE
Problem: OCCUPATIONAL THERAPY ADULT  Goal: Performs self-care activities at highest level of function for planned discharge setting.  See evaluation for individualized goals.  Description: Treatment Interventions: ADL retraining, Functional transfer training, Endurance training, Cognitive reorientation, Patient/family training, Equipment evaluation/education, Compensatory technique education, Continued evaluation, Energy conservation, Activityengagement          See flowsheet documentation for full assessment, interventions and recommendations.   Note: Limitation: Decreased ADL status, Decreased Safe judgement during ADL, Decreased cognition, Decreased endurance, Decreased self-care trans, Decreased high-level ADLs  Prognosis: Good  Assessment: Pt is a 83 y.o. female seen for OT evaluation s/p admission to [facility] on 12/18/2023 due to fall. Diagnosed with Ambulatory dysfunction. Personal and env factors supporting pt at time of IE include age, attitude towards recovery, and accessible home environment. Personal and env factors inhibiting engagement in occupations include difficulty completing ADLs and difficulty completing IADLs. Performance deficits that affect the pt’s occupational performance can be seen above. Due to pt's current functional limitations and medical complications pt is functioning below baseline. Pt would benefit from continued skilled OT treatment in order to maximize safety, independence and overall performance with ADLs, functional mobility, functional transfers, and cognition in order to achieve highest level of function.     Rehab Resource Intensity Level, OT: III (Minimum Resource Intensity) (HHOT at facility)

## 2023-12-21 NOTE — NURSING NOTE
Patient to be discharged in stable condition to [address] via wheelchair van. Report called, instructions sent with patient.

## 2023-12-21 NOTE — DISCHARGE SUMMARY
[facility]  Discharge- Apryl Casas 1940, 83 y.o. female MRN: [id number]  Unit/Bed#: -Zay Encounter: [id number]  Primary Care Provider: Michael Oliver MD   Date and time admitted to hospital: 12/18/2023  4:44 PM    Pulmonary nodules  Assessment & Plan  CT chest   Numerous small pulmonary nodules, measuring up to 5 mm, stable from January 2023. Long-term stability is reassuring for benignity. Recommend follow-up in January 2025 to complete 2 years of observation.     Closed fracture of multiple ribs of right side  Assessment & Plan  CT chest   Acute fractures of right posterior and posterolateral 10th rib with associated edema in the costochondral musculature. No pneumothorax, pleural effusion, or pulmonary contusion.   Manage pain   IS   Rib protocol     Hypomagnesemia  Assessment & Plan  Magnesium 1.4   Denies N/V/D   Replete   Continue to monitor     UTI (urinary tract infection)  Assessment & Plan  Diagnosed with UTI outpatient. Prescribed bactrim 12/14-12/24.   Urine sample (12/18)  UA: large amount of leuks and negative nitrite   Micro: Large amount of wbc and bacteria   Will transition to IV Ceftriaxone   Urine culture - kleb pna , complete keflex at dc    Atrial fibrillation with rapid ventricular response (HCC)  Assessment & Plan  Noted to be in Afib with RVR (HR into the 120s)  Home regimen   Diltiazem 180 mg daily   Coumadin (Hold to supratherapeutic INR)  Mag 1.4/Potassium 3.9   Replete magnesium.   IV lopressor 5 mg prn for HR > 120 sustained.   Start on metoprolol with improvement. Cardiology evaluated and felt stable for dc.  Telemetry     Supratherapeutic INR  Assessment & Plan  On Coumadin due to history of AVR and Afib   INR 5.82   INR goal 2.5-3.5  Home regimen: coumadin 2.5 mg daily 6 days per week. 3.75 mg on Saturdays.   Resume coumadin at dc.     Epilepsy (HCC)  Assessment & Plan  Continue home lamictal   Seizure precautions     S/P aortic valve  replacement  Assessment & Plan  S/p AVR   Chronically on coumadin.    INR goal 2.5-3.5     * Ambulatory dysfunction  Assessment & Plan  Presents from facility after sliding out of her chair onto the right side. Pain to right lower rib region. Denies hitting head or having LOC. Fall unwitnessed.   Chronic ambulatory difficulty.   Admission at [facility] under trauma service in September 2023 after mechanical fall with traumatic hematoma of her left shoulder.  DC to [facility] for STR.   Worsening weakness past couple of days in the setting of UTI   CT head and CT cervical spine negative   CT chest abdomen pelvis   Acute fractures of right posterior and posterolateral 10th rib with associated edema in the costochondral musculature. No pneumothorax, pleural effusion, or pulmonary contusion.   Large left inguinal hernia containing a long segment of descending colon and proximal sigmoid, without evidence of obstruction or incarceration.   Fall precautions   Consult PT/OT and CM        Hospital Course:     Apryl Casas is a 83 y.o. female patient who originally presented to the hospital on   Admission Orders (From admission, onward)       Ordered        12/18/23 2027  INPATIENT ADMISSION  Once                         due to fall in the setting of urinary tract infection.  She will complete course of antibiotics as outlined above.  She had elevated INR that resolved at the time of discharge.  She will resume taking her Coumadin and get INR check on Monday.  Patient was seen by cardiology for elevated heart rates in setting of A-fib, started on metoprolol with improvement.    Please see above list of diagnoses and related plan for additional information.     Physical Exam:    GEN: No acute distress, comfortable  HEEENT: No JVD, PERRLA, no scleral icterus  RESP: Lungs clear to auscultation bilaterally  CV: irreg irreg   ABD: SOFT NON TENDER, POSITIVE BOWEL SOUNDS, NO DISTENTION  PSYCH: CALM  NEURO: Mentation baseline, NO FOCAL  DEFICITS  SKIN: NO RASH  EXTREM: NO EDEMA    CONSULTING PROVIDERS   IP CONSULT TO CASE MANAGEMENT  IP CONSULT TO CARDIOLOGY    PROCEDURES PERFORMED  * No surgery found *    RADIOLOGY RESULTS  CT chest abdomen pelvis w contrast    Addendum Date: 12/18/2023 Addendum:   ADDENDUM: I personally discussed this study with DON PEREZJORDAN on 12/18/2023 8:10 PM.     Result Date: 12/18/2023  Narrative: CT CHEST, ABDOMEN AND PELVIS WITH IV CONTRAST INDICATION:   TRAUMA- FALL. COMPARISON: January 23, 2023 TECHNIQUE: CT examination of the chest, abdomen and pelvis was performed. Multiplanar 2D reformatted images were created from the source data. This examination, like all CT scans performed in the [facility], was performed utilizing techniques to minimize radiation dose exposure, including the use of iterative reconstruction and automated exposure control. Radiation dose length product (DLP) for this visit:  559.6 mGy-cm IV Contrast:  85 mL of iohexol (OMNIPAQUE) Enteric contrast not given.  Absence of intravenous and gastrointestinal contrast limits evaluation of the abdominal and pelvic viscera. FINDINGS: CHEST LUNGS: Numerous pulmonary nodules as before. Right upper lobe measuring 3 mm image 4/13, previously 3 mm Superior segment right lower lobe 4 mm image 4/25, previously 4 mm. Right middle lobe 5 mm image 4/47, previously 5 mm. Right middle lobe lateral 4 mm image 4/58, previously 4 mm. Right lower lobe 5 mm image 4/65, previously 5 mm. Right middle lobe base measuring 5 mm image 4/66, previously 5 mm. Left lung apex 2 mm image 4/14, previously 3 mm. Left upper lobe measuring 5 mm image 4/17, previously 5 mm. Superior segment left lower lobe 5 mm image 4/41, previously 5 mm. Left lower lobe 3 mm image 4/78, previously 3 mm. Posterior left upper lobe 3 mm image 4/41, previously 3 mm. PLEURA: Thickening of the right posterior lower costo chondral soft tissues surrounding the 10th rib with associated pleural  thickening but no significant pleural effusion. HEART/GREAT VESSELS: Cardiomegaly with biatrial enlargement.  Status post open heart surgery with aortic valve prosthesis. Coronary and aortic atherosclerosis. No thoracic aortic aneurysm. Prominent pulmonary arteries with no filling defects. MEDIASTINUM AND NYLA: No lymphadenopathy or mass. No mediastinal hematoma or pneumomediastinum. Esophagus unremarkable.  Trachea and main stem bronchi normal. CHEST WALL AND LOWER NECK: Left-sided cardiac pacer with intact leads. ABDOMEN LIVER/BILIARY TREE: Unremarkable GALLBLADDER:  No calcified gallstones. No pericholecystic inflammatory change. SPLEEN:  Unremarkable. PANCREAS:  Limited evaluation without IV contrast.  Atrophic.  Otherwise grossly unremarkable.. ADRENAL GLANDS:  Unremarkable. KIDNEYS/URETERS:  Limited evaluation without IV contrast.  No hydronephrosis.  Several 1 mm-sized calculi in both kidneys.  Otherwise, grossly unremarkable. STOMACH AND BOWEL: Limited evaluation of GI tract without oral contrast. Stomach mostly collapsed. Small bowel appears average caliber. Large bowel segmentally collapsed. Colonic diverticulosis without evidence of colitis or diverticulitis. Loops of large bowel in the left inguinal hernia which is not obstructed. APPENDIX:  No findings to suggest appendicitis. ABDOMINOPELVIC CAVITY: No lymphadenopathy.  No ascites or discrete fluid collection. No free air VESSELS: No aneurysm or dissection. Diffuse atherosclerosis. PELVIS REPRODUCTIVE ORGANS: Uterus somewhat atrophic, expected for patient's age. 2.3 cm cyst in the right adnexa is stable. URINARY BLADDER: Bladder wall diverticula observed. ABDOMINAL WALL/INGUINAL REGIONS:  Large left inguinal hernia containing a segment of descending and sigmoid colon as seen previously. No evidence of incarceration. OSSEOUS STRUCTURES:  Status post sternotomy.  Generalized degenerative changes in the thoracic and lumbar spine. Grade 1 anterolisthesis L5  on S1. Several healed right-sided rib fractures. Multiple old chronic appearing ununited fractures of the right  11th and 12th ribs.  Chronic appearing fracture of the right transverse process of L1..  Healed fracture of the proximal left femur, status post ORIF.  No evidence of acute fracture.  No destructive or osteoblastic lesion. New displaced fracture fracture of right posterior 10th rib both posteriorly and posterolaterally     Impression: Acute fractures of right posterior and posterolateral 10th rib with associated edema in the costochondral musculature. No pneumothorax, pleural effusion, or pulmonary contusion. Numerous small pulmonary nodules, measuring up to 5 mm, stable from January 2023. Long-term stability is reassuring for benignity. Recommend follow-up in January 2025 to complete 2 years of observation. Large left inguinal hernia containing a long segment of descending colon and proximal sigmoid, without evidence of obstruction or incarceration. Several chronic fractures described above. Attempting to contact referring physician. Workstation performed: [id number]     CT spine cervical without contrast    Result Date: 12/18/2023  Narrative: CT CERVICAL SPINE - WITHOUT CONTRAST INDICATION:   TRAUMA- FALL. COMPARISON: September 23, 2023 TECHNIQUE:  CT examination of the cervical spine was performed without intravenous contrast.  Contiguous axial images were obtained. Multiplanar 2D reformatted images were created from the source data. Radiation dose length product (DLP) for this visit:  396.03 mGy-cm .  This examination, like all CT scans performed in the [facility], was performed utilizing techniques to minimize radiation dose exposure, including the use of iterative  reconstruction and automated exposure control. IMAGE QUALITY:  Diagnostic. FINDINGS: ALIGNMENT:  Normal alignment of the cervical spine. No subluxation. VERTEBRAE:  No fracture. DEGENERATIVE CHANGES:  Moderate multilevel  cervical degenerative changes are noted. No critical central canal stenosis. PREVERTEBRAL AND PARASPINAL SOFT TISSUES: Unremarkable THORACIC INLET:  Normal.     Impression: No cervical spine fracture or traumatic malalignment. Workstation performed: [id number]     CT head without contrast    Result Date: 12/18/2023  Narrative: CT BRAIN - WITHOUT CONTRAST INDICATION:   TRAUMA- FALL. COMPARISON: September 22, 2023 TECHNIQUE:  CT examination of the brain was performed.  Multiplanar 2D reformatted images were created from the source data. Radiation dose length product (DLP) for this visit:  894.71 mGy-cm .  This examination, like all CT scans performed in the [facility], was performed utilizing techniques to minimize radiation dose exposure, including the use of iterative  reconstruction and automated exposure control. IMAGE QUALITY:  Diagnostic. FINDINGS: PARENCHYMA: Decreased attenuation is noted in periventricular and subcortical white matter demonstrating an appearance that is statistically most likely to represent moderate microangiopathic change. 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 No CT signs of acute infarction.  No intracranial mass, mass effect or midline shift.  No acute parenchymal hemorrhage. VENTRICLES AND EXTRA-AXIAL SPACES:  Normal for the patient's age. VISUALIZED ORBITS: Normal visualized orbits. PARANASAL SINUSES: Mild. Diminished fluid level in the sphenoid sinus. CALVARIUM AND EXTRACRANIAL SOFT TISSUES:  Normal. IMPRESSION No acute intracranial abnormality. Workstation performed: [id number]       LABS  Results from last 7 days   Lab Units 12/21/23  1006 12/21/23  0523 12/20/23  0508 12/19/23  0543 12/18/23  1822 12/18/23  1713   WBC Thousand/uL  --  8.81 9.27 10.31*  --  10.57*   HEMOGLOBIN g/dL  --  10.0* 9.2* 10.2*  --  8.7*   HEMATOCRIT %  --  31.0* 28.8* 31.6*  --  28.6*   MCV fL  --  90 90 91  --  94   PLATELETS Thousands/uL  --  276 247 279  --  263    INR  3.31* 0.96 4.84* 4.77* 5.82*  --      Results from last 7 days   Lab Units 12/21/23  0523 12/20/23  0508 12/19/23  0543 12/18/23  1713   SODIUM mmol/L 131* 132* 134* 132*   POTASSIUM mmol/L 4.4 3.9 4.2 3.9   CHLORIDE mmol/L 95* 94* 94* 91*   CO2 mmol/L 30 31 33* 33*   BUN mg/dL 9 10 12 19   CREATININE mg/dL 0.69 0.78 0.89 1.16   CALCIUM mg/dL 8.8 8.7 9.2 9.3   ALBUMIN g/dL 3.5 3.5  --  3.9   TOTAL BILIRUBIN mg/dL 0.44 0.45  --  0.43   ALK PHOS U/L 101 102  --  93   ALT U/L 28 31  --  35   AST U/L 36 45*  --  59*   EGFR ml/min/1.73sq m 80 70 60 43   GLUCOSE RANDOM mg/dL 85 89 78 75     Results from last 7 days   Lab Units 12/18/23  1910 12/18/23  1713   HS TNI 0HR ng/L  --  16   HS TNI 2HR ng/L 20  --                                     Cultures:   Results from last 7 days   Lab Units 12/18/23  1818   COLOR UA  Light Yellow   CLARITY UA  Clear   SPEC GRAV UA  <1.005*   PH UA  8.0   LEUKOCYTES UA  Large*   NITRITE UA  Negative   GLUCOSE UA mg/dl Negative   KETONES UA mg/dl Negative   BILIRUBIN UA  Negative   BLOOD UA  Small*      Results from last 7 days   Lab Units 12/18/23  1818   RBC UA /hpf 1-2   WBC UA /hpf Innumerable*   EPITHELIAL CELLS WET PREP /hpf Occasional   BACTERIA UA /hpf Innumerable*   OTHER OBSERVATIONS  WBCs Clumped      Results from last 7 days   Lab Units 12/18/23  1818   URINE CULTURE  >100,000 cfu/ml Klebsiella pneumoniae*   INFLUENZA A PCR  Negative         Condition at Discharge:  good      Discharge instructions/Information to patient and family:   See after visit summary for information provided to patient and family.      Provisions for Follow-Up Care:  See after visit summary for information related to follow-up care and any pertinent home health orders.      Disposition:     Other: snf       Discharge Statement:  I spent 38 minutes discharging the patient. This time was spent on the day of discharge. I had direct contact with the patient on the day of discharge. Greater than 50% of  the total time was spent examining patient, answering all patient questions, arranging and discussing plan of care with patient as well as directly providing post-discharge instructions.  Additional time then spent on discharge activities.    Discharge Medications:  See after visit summary for reconciled discharge medications provided to patient and family.      ** Please Note: This note has been constructed using a voice recognition system **

## 2023-12-21 NOTE — ASSESSMENT & PLAN NOTE
Presents from facility after sliding out of her chair onto the right side. Pain to right lower rib region. Denies hitting head or having LOC. Fall unwitnessed.   Chronic ambulatory difficulty.   Admission at [facility] under trauma service in September 2023 after mechanical fall with traumatic hematoma of her left shoulder.  [facility]   Worsening weakness past couple of days in the setting of UTI   CT head and CT cervical spine negative   CT chest abdomen pelvis   Acute fractures of right posterior and posterolateral 10th rib with associated edema in the costochondral musculature. No pneumothorax, pleural effusion, or pulmonary contusion.   Large left inguinal hernia containing a long segment of descending colon and proximal sigmoid, without evidence of obstruction or incarceration.   Fall precautions   Consult PT/OT and CM

## 2023-12-21 NOTE — ASSESSMENT & PLAN NOTE
Noted to be in Afib with RVR (HR into the 120s)  Home regimen   Diltiazem 180 mg daily   Coumadin (Hold to supratherapeutic INR)  Mag 1.4/Potassium 3.9   Replete magnesium.   IV lopressor 5 mg prn for HR > 120 sustained.   Start on metoprolol with improvement. Cardiology evaluated and felt stable for dc.  Telemetry

## 2023-12-21 NOTE — ASSESSMENT & PLAN NOTE
Diagnosed with UTI outpatient. Prescribed bactrim 12/14-12/24.   Urine sample (12/18)  UA: large amount of leuks and negative nitrite   Micro: Large amount of wbc and bacteria   Will transition to IV Ceftriaxone   Urine culture - kleb pna , complete keflex at dc

## 2023-12-21 NOTE — CONSULTS
Consult - Cardiology   Apryl Casas 83 y.o. female MRN: [id number]  Unit/Bed#: -01 Encounter: [id number]    Reason For Consult: AF with RVR   Outpatient Cardiologist: Dr Parsons ([facility])            ASSESSMENT:  Chronic atrial fibrillation   Coagulapathy  AS s/p [place] mechanical AVR  Hypertension   Hyperlipidemia  Mechanical fall (reason for admission)   Gait dysfunction   Acute UTI/cystitis     PLAN/ DISCUSSION:     Telemetry reviewed in detail; RVR has been minimal and appears to be improving with treatment of her acute UTI. Some tachycardia should be expected given her presentation. Her AF is most likely chronic looking back at her old EKGs. Overall she feels well without any cardiac symptoms.  Her INR was elevated on admission and was held. Repeat today 0.96 which is most likely lab error. Will repeat this AM. CT did not show any active bleeding. She is higher risk of fall with OAC but unfortunately cannot come off due to having mechanical AV.   Continue home Toprol XL 25mg QD and Cardizem 180mg QD. Could up-titrate if necessary to achieve resting HR <120.   Continue Lipitor 80mg QD  Recent TTE reviewed: EF 65-70%, mechanical AV present, Peak gradient 45 mmHg and mean gradient 27 mmHg, severe TR, severe biatrial dilation.   Compared to previous report 12/22/2022 there is no significant change.   She is stable cardiac wise for discharge.          History of Present Illness:  Apryl Casas is a 83 y.o. year old female with PMH as above who presents to [facility] with mechanical fallout of her chair to her side. She is a limited historian tangential thought pattern on questioning today. Trauma panel showed multiple acute closed fractures to the right sided without any evidence of bleeding. No known LOC or head strike. Her INR was 4 and coumadin was held. Per chart, she was noted with elevated HR in the 130s on admission. Cardiology was called to evaluate RVR. She  denied any cardiac symptoms at this time. Of note she was recently seen for acute UTI on 12/14 started on Bactrum. HRs improved on their own on telemetry without high rate episodes. She follows with [facility] regularly. She has history of mechanical AV and longstanding AF which she is aware of. She reports of a few mechanical falls at home and while doing errands, she did fall a few months ago where she hit her head. She was issued a cane and a walker for better stability which has helped. Additionally she has been working with OT as well.     Past Medical History:        Past Medical History:   Diagnosis Date    Anemia     Anxiety disorder, unspecified     Anxiety disorder, unspecified     Arthritis     Asthma     Cardiac arrest, cause unspecified (HCC)     Chronic obstructive pulmonary disease, unspecified (HCC)     Constipation     Essential (primary) hypertension     GERD (gastroesophageal reflux disease)     Hypo-osmolality and hyponatremia     Hypothyroidism     Nontraumatic hematoma of soft tissue     Other seizures (HCC)     Persistent atrial fibrillation (HCC)     Unspecified urinary incontinence       Past Surgical History:   Procedure Laterality Date    APPENDECTOMY      CARDIAC PACEMAKER PLACEMENT      FEMUR FRACTURE SURGERY          Allergy:        Allergies   Allergen Reactions    Erythromycin Photosensitivity    Shellfish-Derived Products - Food Allergy Hives    Ephedrine Rash    Iodinated Contrast Media Rash    Latex Rash    Nsaids Rash    Povidone Iodine Rash    Salicylates Rash    Shrimp (Diagnostic) - Food Allergy Rash    Sympathomimetics Rash    Tiotropium Rash    Tositumomab Rash       Medications:       Prior to Admission medications    Medication Sig Start Date End Date Taking? Authorizing Provider   ascorbic acid (VITAMIN C) 500 MG tablet Take 500 mg by mouth daily   Yes Historical Provider, MD   atorvastatin (LIPITOR) 80 mg tablet Take 80 mg by mouth daily   Yes Historical  Provider, MD   bisacodyl (DULCOLAX) 10 mg suppository Insert 10 mg into the rectum daily as needed for constipation   Yes Historical Provider, MD   Calcium Carb-Cholecalciferol 600-10 MG-MCG TABS Take 1 tablet by mouth every morning 8/31/23  Yes Historical Provider, MD   calcium carbonate (OS-JERARDO) 600 MG tablet Take 600 mg by mouth daily   Yes Historical Provider, MD   diltiazem (CARDIZEM CD) 180 mg 24 hr capsule  10/26/23  Yes Historical Provider, MD   fluticasone-umeclidinium-vilanterol (Trelegy Ellipta) 100-62.5-25 mcg/actuation inhaler 1 puff every 24 hours 8/31/23  Yes Historical Provider, MD   lamoTRIgine (LaMICtal) 100 mg tablet Take 100 mg by mouth 2 (two) times a day   Yes Historical Provider, MD   levothyroxine 125 mcg tablet Take 75 mcg by mouth daily   Yes Historical Provider, MD   loperamide (IMODIUM) 2 mg capsule  10/10/23  Yes Historical Provider, MD   magnesium hydroxide (MILK OF MAGNESIA) 400 mg/5 mL oral suspension Take 30 mL by mouth 9/29/23  Yes Historical Provider, MD   montelukast (SINGULAIR) 10 mg tablet Take 10 mg by mouth daily at bedtime   Yes Historical Provider, MD   Multiple Vitamin (multivitamin) tablet Take 1 tablet by mouth daily   Yes Historical Provider, MD   omeprazole (PriLOSEC) 20 mg delayed release capsule Take 20 mg by mouth daily   Yes Historical Provider, MD   oxybutynin (DITROPAN) 5 mg tablet Take 5 mg by mouth 3 (three) times a day   Yes Historical Provider, MD   potassium chloride (K-DUR,KLOR-CON) 10 mEq tablet Take 1 tablet (10 mEq total) by mouth daily 12/13/23  Yes Dianelys Hernandez MD   senna (SENOKOT) 8.6 MG tablet Take 1 tablet by mouth daily as needed for constipation   Yes Historical Provider, MD   sertraline (ZOLOFT) 100 mg tablet Take 100 mg by mouth daily at bedtime   Yes Historical Provider, MD   sulfamethoxazole-trimethoprim (BACTRIM DS) 800-160 mg per tablet Take 1 tablet by mouth every 12 (twelve) hours 12/14/23 12/24/23 Yes Historical Provider, MD    warfarin (COUMADIN) 2.5 mg tablet Take by mouth daily 3.75 mg weekly (Saturday)  2.5 mg 6 x a week   Yes Historical Provider, MD   acetaminophen (TYLENOL) 325 mg tablet Take 650 mg by mouth every 8 (eight) hours as needed for mild pain    Historical Provider, MD   albuterol (PROVENTIL HFA,VENTOLIN HFA) 90 mcg/act inhaler Inhale 2 puffs every 6 (six) hours as needed for wheezing    Historical Provider, MD   denosumab (Prolia) 60 mg/mL as directed Subcutaneous 11/9/23   Historical Provider, MD   Lidocaine 4 % PTCH Apply 1 patch topically daily at bedtime On for 12 hrs, off for 12 hrs    Historical Provider, MD   sodium phosphate-biphosphate (FLEET) 7-19 g 118 mL enema Insert 118 mL into the rectum 9/29/23   Historical Provider, MD       Family History:     Family History   Family history unknown: Yes        Social History:       Social History     Socioeconomic History    Marital status:      Spouse name: None    Number of children: None    Years of education: None    Highest education level: None   Occupational History    None   Tobacco Use    Smoking status: Never    Smokeless tobacco: Never   Vaping Use    Vaping status: Never Used   Substance and Sexual Activity    Alcohol use: Not Currently    Drug use: Never    Sexual activity: Not Currently   Other Topics Concern    None   Social History Narrative    None     Social Determinants of Health     Financial Resource Strain: Not on file   Food Insecurity: No Food Insecurity (12/20/2023)    Hunger Vital Sign     Worried About Running Out of Food in the Last Year: Never true     Ran Out of Food in the Last Year: Never true   Transportation Needs: No Transportation Needs (12/20/2023)    PRAPARE - Transportation     Lack of Transportation (Medical): No     Lack of Transportation (Non-Medical): No   Physical Activity: Not on file   Stress: Not on file   Social Connections: Not on file   Intimate Partner Violence: Not on file   Housing Stability: Low Risk   (12/20/2023)    Housing Stability Vital Sign     Unable to Pay for Housing in the Last Year: No     Number of Places Lived in the Last Year: 1     Unstable Housing in the Last Year: No       Weights/BMI:    Wt Readings from Last 3 Encounters:   12/21/23 58.3 kg (128 lb 8.5 oz)   12/08/23 54 kg (119 lb)   09/24/23 57.2 kg (126 lb)   , Body mass index is 25.1 kg/m².      ROS:  14 point ROS negative except as outlined above  Remainder review of systems is negative    Exam:  General: Alert, oriented and in no acute distress, cooperative  Head: Normocephalic, atraumatic.  Eyes: PERRLA. No icterus. Normal Conjunctiva.   Oropharynx: Moist and normal-appearing mucosa  Neck: Supple, symmetrical, trachea midline, JVD not appreciated.   Heart: RRR, no murmur, rub or gallop, S1 & S2 normal   Lungs: Normal air entry, lungs clear to auscultation and no rales, rhonchi or wheezing   Abdomen: Flat, normal findings: bowel sounds normal and soft, non-tender  Lower Limbs:  No pitting edema, 2+ peripheral pulses, capillary refill within normal limits  Musculoskeletal: ROM grossly normal

## 2023-12-21 NOTE — PHYSICAL THERAPY NOTE
"S: Patient is a 17 year old  female child here with concern of 4 - 6 episodes of \"tight feeling in my chest\".  Episodes started 6 - 8 weeks ago.      Episodes described as 10 - 15 minutes of gradual tightening of chest ( hand over her sternum), sweating.  Episodes gradually resolve.  There is no sensation of racing heart, trouble breathing, nausea, abdominal pain or back pain with episodes.    First two episodes occurred while getting ready for bed.  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  Last episode occurred at home.    She started working at [employer] two months ago.  She did not want to work the check out but it was only option for her.  My impression is that she was encouraged to seek some type of work by her mother.  Honey many times related how she is not comfortable in social situations, particularly having to talk to people.      Honey states she has two fairly good friends but she is not making any effort to keep in touch with them.    Honey has been online for school this year so far - her choice.  When asked abut missing the social side of school she said she is happier at home.      She is working 25 - 30 hours a week.  This has made getting her school work done more stressful for her.  She does relate she is getting good grades.               PMH: as above, sees Dermatology for acne, hoping for clearance to start BCP             FH:  Mother has Generalized Anxiety Disorder      O: General: well developed and well nourished female adolescent no acute distress        HEENT: unremarkable        NECK: supple        LUNGS: clear to auscultation        HEART: regular rate and rhythm        ABDOMEN: soft, obese       SKIN: minimal acne       NEURO: age appropriate,        LYMPH:        OTHER:     Diagnostics: Lab:                        Xray:                        Other: EKG: unremarkable, normal sinus rhythm      A: anxiety attacks                           P:referral " Physical Therapy Evaluation     Patient's Name: Apryl Casas    Admitting Diagnosis  UTI (urinary tract infection) [N39.0]  Anemia [D64.9]  Back injury [S39.92XA]  Elevated INR [R79.1]  Pulmonary nodules [R91.8]  Atrial fibrillation with rapid ventricular response (HCC) [I48.91]  Recurrent falls [R29.6]  Traumatic ecchymosis of rib, initial encounter [S20.20XA]  Fracture of one rib, right side, initial encounter for closed fracture [S22.31XA]    Problem List  Patient Active Problem List   Diagnosis    Fall    Traumatic hematoma of left shoulder    S/P aortic valve replacement    Epilepsy (HCC)    At risk for delirium    Hyponatremia    Supratherapeutic INR    Ambulatory dysfunction    Atrial fibrillation with rapid ventricular response (HCC)    UTI (urinary tract infection)    Hypomagnesemia    Closed fracture of multiple ribs of right side    Pulmonary nodules       Past Medical History  Past Medical History:   Diagnosis Date    Anemia     Anxiety disorder, unspecified     Anxiety disorder, unspecified     Arthritis     Asthma     Cardiac arrest, cause unspecified (HCC)     Chronic obstructive pulmonary disease, unspecified (HCC)     Constipation     Essential (primary) hypertension     GERD (gastroesophageal reflux disease)     Hypo-osmolality and hyponatremia     Hypothyroidism     Nontraumatic hematoma of soft tissue     Other seizures (HCC)     Persistent atrial fibrillation (HCC)     Unspecified urinary incontinence        Past Surgical History  Past Surgical History:   Procedure Laterality Date    APPENDECTOMY      CARDIAC PACEMAKER PLACEMENT      FEMUR FRACTURE SURGERY            12/21/23 1000   PT Last Visit   PT Visit Date 12/21/23   Note Type   Note type Evaluation   Pain Assessment   Pain Assessment Tool 0-10   Pain Score No Pain   Restrictions/Precautions   Weight Bearing Precautions Per Order No   Other Precautions Cognitive;Chair Alarm;Bed Alarm;Fall Risk   Home Living   Type of Home    ([place] [facility])   Home Equipment Life alert;Walker;Other (Comment)  (rollator - uses PTA)   Prior Function   Level of [place] Independent with ADLs;Independent with functional mobility;Needs assistance with IADLS   Lives With Facility staff   Receives Help From Personal care attendant   IADLs Family/Friend/Other provides transportation;Family/Friend/Other provides meals;Family/Friend/Other provides medication management   Falls in the last 6 months 1 to 4  (at least 2)   Vocational Retired   General   Family/Caregiver Present No   Cognition   Overall Cognitive Status Impaired   Arousal/Participation Alert   Attention Attends with cues to redirect   Orientation Level Oriented to person;Oriented to place;Oriented to situation;Disoriented to time   Memory Decreased recall of precautions;Decreased short term memory   Following Commands Follows one step commands with increased time or repetition   Comments Decreased safety awareness and insight.  Tangential, requires redirection to task.   Subjective   Subjective Pleasant and agreeable to participate.   RLE Assessment   RLE Assessment   (functionally 3+/5)   LLE Assessment   LLE Assessment   (functionally 3+/5)   Bed Mobility   Supine to Sit 4  Minimal assistance   Additional items Assist x 1;HOB elevated;Increased time required;Verbal cues   Additional Comments Left OOB in chair with chair alarm intact and all needs in reach.   Transfers   Sit to Stand 5  Supervision   Additional items Increased time required   Stand to Sit 5  Supervision   Additional items Increased time required   Additional Comments with RW   Ambulation/Elevation   Gait pattern Excessively slow;Short stride;Foward flexed   Gait Assistance   (CGA progressing to S)   Assistive Device Rolling walker   Distance 5 ft x 2  (limited by lines)   Balance   Static Sitting Good   Dynamic Sitting Fair +   Static Standing Fair   Dynamic Standing Fair -   Ambulatory Fair -   Activity Tolerance  "for counseling - \"to learn tools to manage anxiety attacks and manage social anxiety\"                "   Activity Tolerance Patient tolerated treatment well   Nurse Made Aware RN cleared pt to be seen by PT   Assessment   Prognosis Good   Problem List Decreased strength;Decreased range of motion;Decreased endurance;Impaired balance;Decreased mobility;Decreased safety awareness;Impaired judgement;Decreased cognition   Assessment Pt seen for high complexity PT evaluation due to decrease in functional mobility status compared to baseline.  Pt with active PT eval/treat orders at this time.  Pt is a 83 y.o. F who presented to [facility] with fall from chair resulting in R 10th rib on 12/18/23.  Pt  has a past medical history of Anemia, Anxiety disorder, unspecified, Anxiety disorder, unspecified, Arthritis, Asthma, Cardiac arrest, cause unspecified ([facility]), Chronic obstructive pulmonary disease, unspecified ([facility]), Constipation, Essential (primary) hypertension, GERD (gastroesophageal reflux disease), Hypo-osmolality and hyponatremia, Hypothyroidism, Nontraumatic hematoma of soft tissue, Other seizures ([facility]), Persistent atrial fibrillation ([facility]), and Unspecified urinary incontinence.  Pt resides at [facility].  Pt presents with decreased strength, balance, endurance that contribute to limitations in bed mobility, functional transfers, functional mobility.  Pt requires Min A for supine>sit, S for STS, and CGA for ambulation at this time.  Pt left upright in bedside chair with chair alarm intact and with all needs in reach.  Pt will benefit from skilled therapy in order to address current impairments and functional limitations. PT to follow pt and recommending return to facility with services.  The patient's AM-PAC Basic Mobility Inpatient Short Form Raw Score is 17. A Raw score of greater than 16 suggests the patient may benefit from discharge to home. Please also refer to the recommendation of the Physical Therapist for safe discharge planning.   Goals   Patient Goals to go home   STG Expiration Date  01/04/24   Short Term Goal #1 1. Pt will demonstrate ability to perform all aspects of bed mobility with I in order to increase independence and decrease burden on caregivers. 2. Pt will demonstrate ability to perform functional transfers with Mod I in order to increase independence and decrease burden on caregivers.  3. Pt will demonstrate ability to ambulate 200 ft with least restrictive AD with Mod I in order to return to mobility safely. 4. Pt will demonstrate ability to negotiate full flight steps with/without HR and Mod I in order to return to household/community mobility safely. 5. Pt will demonstrate improved balance by one grade order to decrease risk of falls.  6. Pt will increase b/l LE strength by 1 grade in order to increase ease of functional mobility and transfers.   Plan   Treatment/Interventions Functional transfer training;LE strengthening/ROM;Therapeutic exercise;Endurance training;Cognitive reorientation;Patient/family training;Equipment eval/education;Bed mobility;Gait training;Spoke to nursing   PT Frequency 2-3x/wk   Discharge Recommendation   Rehab Resource Intensity Level, PT III (Minimum Resource Intensity)   Equipment Recommended Walker   Walker Package Recommended Wheeled walker   Change/add to Walker Package? No   AM-PAC Basic Mobility Inpatient   Turning in Flat Bed Without Bedrails 3   Lying on Back to Sitting on Edge of Flat Bed Without Bedrails 3   Moving Bed to Chair 3   Standing Up From Chair Using Arms 3   Walk in Room 3   Climb 3-5 Stairs With Railing 2   Basic Mobility Inpatient Raw Score 17   Basic Mobility Standardized Score 39.67   Highest Level Of Mobility   JH-HLM Goal 5: Stand one or more mins   JH-HLM Achieved 6: Walk 10 steps or more   Modified Parker Scale   Modified Parker Scale 4         Elisabeth Lind, PT, DPT

## 2023-12-21 NOTE — CASE MANAGEMENT
Case Management Discharge Planning Note    Patient name Apryl Casas  Location /-01 MRN [id number]  : 1940 Date 2023       Current Admission Date: 2023  Current Admission Diagnosis:Ambulatory dysfunction   Patient Active Problem List    Diagnosis Date Noted    Supratherapeutic INR 2023    Ambulatory dysfunction 2023    Atrial fibrillation with rapid ventricular response (HCC) 2023    UTI (urinary tract infection) 2023    Hypomagnesemia 2023    Closed fracture of multiple ribs of right side 2023    Pulmonary nodules 2023    Hyponatremia 2023    At risk for delirium 2023    Fall 2023    Traumatic hematoma of left shoulder 2023    S/P aortic valve replacement 2023    Epilepsy (HCC) 2023      LOS (days): 3  Geometric Mean LOS (GMLOS) (days): 3.1  Days to GMLOS:0.2     OBJECTIVE:  Risk of Unplanned Readmission Score: 21.81         Current admission status: Inpatient   Preferred Pharmacy:   [facility] [id number] [place], [place] - [address]  [address] [address]  Phone: [phone] Fax: [phone]    [id number] [place] - [address]  [address] [facility] [address]  Phone: [phone] Fax: [phone]    [facility] [place], [place] - [address]  [address] [address]  Phone: [phone] Fax: [phone]    Primary Care Provider: Michael Oliver MD    Primary Insurance: [employer] CROSS [employer] REP  Secondary Insurance:     DISCHARGE DETAILS:         Additional Comments: CM called [address] and spoke to Carol to inform her that pt is medically stable for dc and cleared by therapy for home therapy. Carol is agreeable and requesting [facility]. AIDIN referral sent. [facility] accepted. CM spoke to pt to discuss transport. Pt requested transport;  Van transport arranged with [place] for a 5pm   time. CM discussed WC Van cost with pt; she is agreeable. CM called and notified pt's nephew and daughter of dc time. Pt's daughter Lynnette states she is unable to transport pt; CM explained that transport was arranged. CM faxed dc instructions to Carol at fax# [phone].

## 2023-12-22 LAB
ATRIAL RATE: 174 BPM
QRS AXIS: 60 DEGREES
QRSD INTERVAL: 84 MS
QT INTERVAL: 340 MS
QTC INTERVAL: 460 MS
T WAVE AXIS: -6 DEGREES
VENTRICULAR RATE: 110 BPM

## 2023-12-22 PROCEDURE — 93010 ELECTROCARDIOGRAM REPORT: CPT | Performed by: INTERNAL MEDICINE

## 2023-12-22 NOTE — UTILIZATION REVIEW
NOTIFICATION OF ADMISSION DISCHARGE   This is a Notification of Discharge from [facility]. Please be advised that this patient has been discharge from our facility. Below you will find the admission and discharge date and time including the patient’s disposition.   UTILIZATION REVIEW CONTACT:  Katrina Chambers  Utilization   Network Utilization Review Department  Phone: [phone] x [phone] listen to the prompts. All voicemails are confidential.  Email: [email or web address]     ADMISSION INFORMATION  PRESENTATION DATE: 12/18/2023  4:44 PM  OBERVATION ADMISSION DATE:   INPATIENT ADMISSION DATE: 12/18/23  8:27 PM   DISCHARGE DATE: 12/21/2023  6:28 PM   DISPOSITION:Non [facility] SNF/TCU/SNU    Network Utilization Review Department  ATTENTION: Please call with any questions or concerns to [phone] and carefully listen to the prompts so that you are directed to the right person. All voicemails are confidential.   For Discharge needs, contact Care Management DC Support Team at [phone] opt. 2  Send all requests for admission clinical reviews, approved or denied determinations and any other requests to dedicated fax number below belonging to the campus where the patient is receiving treatment. List of dedicated fax numbers for the Facilities:  FACILITY NAME UR FAX NUMBER   ADMISSION DENIALS (Administrative/Medical Necessity) [phone]   DISCHARGE SUPPORT TEAM ([facility]) [phone]   PARENT CHILD HEALTH (Maternity/NICU/Pediatrics) [phone]   [facility] [phone]   [facility] [phone]   [facility] [phone]   [facility] [phone]   [facility] [phone]   [facility] [phone]   [facility] [phone]   [facility]  [phone]   [facility] [phone]   [facility] [phone]   [facility] [phone]

## 2023-12-27 ENCOUNTER — APPOINTMENT (OUTPATIENT)
Dept: LAB | Facility: HOSPITAL | Age: 83
End: 2023-12-27
Payer: COMMERCIAL

## 2023-12-27 ENCOUNTER — APPOINTMENT (EMERGENCY)
Dept: RADIOLOGY | Facility: HOSPITAL | Age: 83
End: 2023-12-27
Payer: COMMERCIAL

## 2023-12-27 ENCOUNTER — HOSPITAL ENCOUNTER (EMERGENCY)
Facility: HOSPITAL | Age: 83
Discharge: HOME | End: 2023-12-27
Attending: EMERGENCY MEDICINE
Payer: COMMERCIAL

## 2023-12-27 ENCOUNTER — APPOINTMENT (EMERGENCY)
Dept: CT IMAGING | Facility: HOSPITAL | Age: 83
End: 2023-12-27
Payer: COMMERCIAL

## 2023-12-27 VITALS
DIASTOLIC BLOOD PRESSURE: 88 MMHG | RESPIRATION RATE: 18 BRPM | HEART RATE: 82 BPM | WEIGHT: 127.87 LBS | TEMPERATURE: 97.3 F | BODY MASS INDEX: 24.97 KG/M2 | SYSTOLIC BLOOD PRESSURE: 141 MMHG | OXYGEN SATURATION: 95 %

## 2023-12-27 DIAGNOSIS — S09.90XA HEAD INJURY: ICD-10-CM
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
## 2023-12-27 LAB
ANION GAP SERPL CALCULATED.3IONS-SCNC: 6 MMOL/L
APTT PPP: 42 SECONDS (ref 23–37)
BASOPHILS # BLD AUTO: 0.06 THOUSANDS/ÂΜL (ref 0–0.1)
BASOPHILS NFR BLD AUTO: 1 % (ref 0–1)
BUN SERPL-MCNC: 12 MG/DL (ref 5–25)
CALCIUM SERPL-MCNC: 9.4 MG/DL (ref 8.4–10.2)
CHLORIDE SERPL-SCNC: 95 MMOL/L (ref 96–108)
CO2 SERPL-SCNC: 31 MMOL/L (ref 21–32)
CREAT SERPL-MCNC: 0.7 MG/DL (ref 0.6–1.3)
EOSINOPHIL # BLD AUTO: 0.09 THOUSAND/ÂΜL (ref 0–0.61)
EOSINOPHIL NFR BLD AUTO: 1 % (ref 0–6)
ERYTHROCYTE [DISTWIDTH] IN BLOOD BY AUTOMATED COUNT: 17 % (ref 11.6–15.1)
GFR SERPL CREATININE-BSD FRML MDRD: 80 ML/MIN/1.73SQ M
GLUCOSE SERPL-MCNC: 85 MG/DL (ref 65–140)
HCT VFR BLD AUTO: 31.8 % (ref 34.8–46.1)
HGB BLD-MCNC: 10 G/DL (ref 11.5–15.4)
IMM GRANULOCYTES # BLD AUTO: 0.15 THOUSAND/UL (ref 0–0.2)
IMM GRANULOCYTES NFR BLD AUTO: 2 % (ref 0–2)
INR PPP: 3.02 (ref 0.84–1.19)
LYMPHOCYTES # BLD AUTO: 0.86 THOUSANDS/ÂΜL (ref 0.6–4.47)
LYMPHOCYTES NFR BLD AUTO: 11 % (ref 14–44)
MCH RBC QN AUTO: 28.7 PG (ref 26.8–34.3)
MCHC RBC AUTO-ENTMCNC: 31.4 G/DL (ref 31.4–37.4)
MCV RBC AUTO: 91 FL (ref 82–98)
MONOCYTES # BLD AUTO: 0.71 THOUSAND/ÂΜL (ref 0.17–1.22)
MONOCYTES NFR BLD AUTO: 9 % (ref 4–12)
NEUTROPHILS # BLD AUTO: 6.02 THOUSANDS/ÂΜL (ref 1.85–7.62)
NEUTS SEG NFR BLD AUTO: 76 % (ref 43–75)
NRBC BLD AUTO-RTO: 0 /100 WBCS
PLATELET # BLD AUTO: 311 THOUSANDS/UL (ref 149–390)
PMV BLD AUTO: 8.7 FL (ref 8.9–12.7)
POTASSIUM SERPL-SCNC: 4.3 MMOL/L (ref 3.5–5.3)
PROTHROMBIN TIME: 31.8 SECONDS (ref 11.6–14.5)
RBC # BLD AUTO: 3.48 MILLION/UL (ref 3.81–5.12)
SODIUM SERPL-SCNC: 132 MMOL/L (ref 135–147)
WBC # BLD AUTO: 7.89 THOUSAND/UL (ref 4.31–10.16)

## 2023-12-27 PROCEDURE — 99285 EMERGENCY DEPT VISIT HI MDM: CPT | Performed by: PHYSICIAN ASSISTANT

## 2023-12-27 PROCEDURE — 80048 BASIC METABOLIC PNL TOTAL CA: CPT | Performed by: PHYSICIAN ASSISTANT

## 2023-12-27 PROCEDURE — 71045 X-RAY EXAM CHEST 1 VIEW: CPT

## 2023-12-27 PROCEDURE — 85025 COMPLETE CBC W/AUTO DIFF WBC: CPT | Performed by: PHYSICIAN ASSISTANT

## 2023-12-27 PROCEDURE — 85730 THROMBOPLASTIN TIME PARTIAL: CPT | Performed by: PHYSICIAN ASSISTANT

## 2023-12-27 PROCEDURE — 93005 ELECTROCARDIOGRAM TRACING: CPT

## 2023-12-27 PROCEDURE — 72125 CT NECK SPINE W/O DYE: CPT

## 2023-12-27 PROCEDURE — 99285 EMERGENCY DEPT VISIT HI MDM: CPT

## 2023-12-27 PROCEDURE — 36415 COLL VENOUS BLD VENIPUNCTURE: CPT | Performed by: PHYSICIAN ASSISTANT

## 2023-12-27 PROCEDURE — 70450 CT HEAD/BRAIN W/O DYE: CPT

## 2023-12-27 PROCEDURE — 85610 PROTHROMBIN TIME: CPT | Performed by: PHYSICIAN ASSISTANT

## 2023-12-27 NOTE — ED NOTES
[facility] no answer, left voicemail     Joslyn Mtz RN  12/27/23 [id number]

## 2023-12-27 NOTE — ED PROVIDER NOTES
Emergency Department Trauma Note  Apryl Casas 83 y.o. female MRN: [id number]  Unit/Bed#: ED TR13/TR13B Encounter: [id number]      Trauma Alert: Trauma Acuity: Trauma Evaluation  Model of Arrival: Mode of Arrival: BLS via Trauma Squad Name and Number: [id number]  Trauma Team: Current Providers  Attending Provider: Mahamed Vergara DO  Physician Assistant: Katherine Bo PA-C  Registered Nurse: Miranda Olivares RN  Consultants:     None      History of Present Illness     Chief Complaint:   Chief Complaint   Patient presents with    Fall     Pt to ED from [facility] for a fall last night. Pt was getting ready for bed, stepped backward and fell. Hit head, denies LOC. +thinners.      HPI:  Apryl Casas is a 83 y.o. female who presents with head injury after a fall last night.  Mechanism:Details of Incident: pt was getting dressed for bed, stepped backwards and fell. pt hit her head, denies LOC. +thinners. was able to get up with assistace. Injury Date: 12/26/23   Injury Occurence Location - Specify County: [place]    Patient is an 84 y/o F with h/o COPD, afib on Coumadin that was BIBA from SNF after a fall last night.  Patient states she was changing for bed and got caught in her pants and fell backward and hit her head on a wheelchair. She denies LOC.  No nausea, vomiting or headache.  SHe denies dizziness or vision changes.  No other injuries. She is alert and oriented to person and place.       History provided by:  Patient and EMS personnel  Fall  Associated symptoms: no abdominal pain, no back pain, no chest pain, no headaches, no nausea, no neck pain and no vomiting      Review of Systems   Constitutional:  Negative for chills and fever.   HENT: Negative.     Eyes:  Negative for visual disturbance.   Respiratory:  Negative for cough and shortness of breath.    Cardiovascular:  Negative for chest pain.   Gastrointestinal:  Negative for abdominal pain, diarrhea, nausea and vomiting.   Genitourinary:   Negative for dysuria.   Musculoskeletal:  Negative for back pain and neck pain.   Skin:  Negative for color change, pallor and rash.   Neurological:  Negative for dizziness, weakness, light-headedness and headaches.   Psychiatric/Behavioral:  Negative for confusion.    All other systems reviewed and are negative.      Historical Information     Immunizations:   Immunization History   Administered Date(s) Administered    COVID-19 PFIZER VACCINE 0.3 ML IM 02/08/2021, 03/01/2021, 09/02/2021, 05/25/2022    COVID-19 Pfizer Vac BIVALENT Luis-sucrose 12 Yr+ IM 10/17/2022    Influenza Split High Dose Preservative Free IM 10/17/2022    Tdap 08/02/2022       Past Medical History:   Diagnosis Date    Anemia     Anxiety disorder, unspecified     Anxiety disorder, unspecified     Arthritis     Asthma     Cardiac arrest, cause unspecified (HCC)     Chronic obstructive pulmonary disease, unspecified (HCC)     Constipation     Essential (primary) hypertension     GERD (gastroesophageal reflux disease)     Hypo-osmolality and hyponatremia     Hypothyroidism     Nontraumatic hematoma of soft tissue     Other seizures (HCC)     Persistent atrial fibrillation (HCC)     Unspecified urinary incontinence        Family History   Family history unknown: Yes     Past Surgical History:   Procedure Laterality Date    APPENDECTOMY      CARDIAC PACEMAKER PLACEMENT      FEMUR FRACTURE SURGERY       Social History     Tobacco Use    Smoking status: Never    Smokeless tobacco: Never   Vaping Use    Vaping status: Never Used   Substance Use Topics    Alcohol use: Not Currently    Drug use: Never     E-Cigarette/Vaping    E-Cigarette Use Never User      E-Cigarette/Vaping Substances       Family History: non-contributory    Meds/Allergies   Prior to Admission Medications   Prescriptions Last Dose Informant Patient Reported? Taking?   Calcium Carb-Cholecalciferol 600-10 MG-MCG TABS   Yes No   Sig: Take 1 tablet by mouth every morning   Lidocaine 4 %  PTCH   Yes No   Sig: Apply 1 patch topically daily at bedtime On for 12 hrs, off for 12 hrs   Multiple Vitamin (multivitamin) tablet   Yes No   Sig: Take 1 tablet by mouth daily   acetaminophen (TYLENOL) 325 mg tablet   Yes No   Sig: Take 650 mg by mouth every 8 (eight) hours as needed for mild pain   albuterol (PROVENTIL HFA,VENTOLIN HFA) 90 mcg/act inhaler   Yes No   Sig: Inhale 2 puffs every 6 (six) hours as needed for wheezing   ascorbic acid (VITAMIN C) 500 MG tablet   Yes No   Sig: Take 500 mg by mouth daily   atorvastatin (LIPITOR) 80 mg tablet   Yes No   Sig: Take 80 mg by mouth daily   bisacodyl (DULCOLAX) 10 mg suppository   Yes No   Sig: Insert 10 mg into the rectum daily as needed for constipation   calcium carbonate (OS-JERARDO) 600 MG tablet   Yes No   Sig: Take 600 mg by mouth daily   denosumab (Prolia) 60 mg/mL   Yes No   Sig: as directed Subcutaneous   diltiazem (CARDIZEM CD) 180 mg 24 hr capsule   Yes No   fluticasone-umeclidinium-vilanterol (Trelegy Ellipta) 100-62.5-25 mcg/actuation inhaler   Yes No   Si puff every 24 hours   lamoTRIgine (LaMICtal) 100 mg tablet   Yes No   Sig: Take 100 mg by mouth 2 (two) times a day   levothyroxine 125 mcg tablet   Yes No   Sig: Take 75 mcg by mouth daily   loperamide (IMODIUM) 2 mg capsule   Yes No   magnesium hydroxide (MILK OF MAGNESIA) 400 mg/5 mL oral suspension   Yes No   Sig: Take 30 mL by mouth   metoprolol succinate (TOPROL-XL) 25 mg 24 hr tablet   No No   Sig: Take 1 tablet (25 mg total) by mouth daily   montelukast (SINGULAIR) 10 mg tablet   Yes No   Sig: Take 10 mg by mouth daily at bedtime   omeprazole (PriLOSEC) 20 mg delayed release capsule   Yes No   Sig: Take 20 mg by mouth daily   oxybutynin (DITROPAN) 5 mg tablet   Yes No   Sig: Take 5 mg by mouth 3 (three) times a day   potassium chloride (K-DUR,KLOR-CON) 10 mEq tablet   No No   Sig: Take 1 tablet (10 mEq total) by mouth daily   senna (SENOKOT) 8.6 MG tablet   Yes No   Sig: Take 1 tablet by  mouth daily as needed for constipation   sertraline (ZOLOFT) 100 mg tablet   Yes No   Sig: Take 100 mg by mouth daily at bedtime   sodium phosphate-biphosphate (FLEET) 7-19 g 118 mL enema   Yes No   Sig: Insert 118 mL into the rectum   warfarin (COUMADIN) 2.5 mg tablet   Yes No   Sig: Take by mouth daily 3.75 mg weekly (Saturday)  2.5 mg 6 x a week      Facility-Administered Medications: None       Allergies   Allergen Reactions    Erythromycin Photosensitivity    Shellfish-Derived Products - Food Allergy Hives    Ephedrine Rash    Iodinated Contrast Media Rash    Latex Rash    Nsaids Rash    Povidone Iodine Rash    Salicylates Rash    Shrimp (Diagnostic) - Food Allergy Rash    Sympathomimetics Rash    Tiotropium Rash    Tositumomab Rash       PHYSICAL EXAM    PE limited by: nothing    Objective   Vitals:   First set: Temperature: (!) 97.3 °F (36.3 °C) (12/27/23 0817)  Pulse: 90 (12/27/23 0817)  Respirations: 18 (12/27/23 0817)  Blood Pressure: 144/79 (12/27/23 0817)  SpO2: 98 % (12/27/23 0817)    Primary Survey:   (A) Airway: patent  (B) Breathing: normal  (C) Circulation: Pulses:   normal  (D) Disabliity:  GCS Total:  15  (E) Expose:  Completed    Secondary Survey: (Click on Physical Exam tab above)  Physical Exam  Vitals and nursing note reviewed.   Constitutional:       General: She is not in acute distress.     Appearance: Normal appearance. She is well-developed, well-groomed and normal weight. She is not ill-appearing or diaphoretic.   HENT:      Head: Normocephalic and atraumatic.      Right Ear: Hearing normal.      Left Ear: Hearing normal.      Nose: Nose normal.      Mouth/Throat:      Mouth: Mucous membranes are moist.   Eyes:      Conjunctiva/sclera: Conjunctivae normal.   Cardiovascular:      Rate and Rhythm: Normal rate and regular rhythm.   Pulmonary:      Effort: Pulmonary effort is normal.      Breath sounds: Normal breath sounds. No wheezing, rhonchi or rales.   Chest:      Chest wall: No  deformity, swelling or tenderness.   Musculoskeletal:      Cervical back: Normal, normal range of motion and neck supple. No spinous process tenderness or muscular tenderness.      Thoracic back: Normal.      Lumbar back: Normal.      Comments: B/L LE and UE FROM, nontender to palpation   Skin:     General: Skin is warm and dry.      Coloration: Skin is not pale.      Findings: No bruising or erythema.   Neurological:      Mental Status: She is alert. Mental status is at baseline.      GCS: GCS eye subscore is 4. GCS verbal subscore is 5. GCS motor subscore is 6.      Cranial Nerves: Cranial nerves 2-12 are intact.      Sensory: Sensation is intact.      Motor: Motor function is intact.      Comments: Oriented to person and place   Psychiatric:         Mood and Affect: Mood normal.         Speech: Speech normal.         Behavior: Behavior is cooperative.         Cervical spine cleared by clinical criteria? No (imaging required)      Invasive Devices       Peripheral Intravenous Line  Duration             Peripheral IV 12/27/23 Right Antecubital <1 day                    Lab Results:   Results Reviewed       Procedure Component Value Units Date/Time    Protime-INR [[id number]]  (Abnormal) Collected: 12/27/23 0830    Lab Status: Final result Specimen: Blood from Arm, Right Updated: 12/27/23 0940     Protime 31.8 seconds      INR 3.02    APTT [[id number]]  (Abnormal) Collected: 12/27/23 0830    Lab Status: Final result Specimen: Blood from Arm, Right Updated: 12/27/23 0940     PTT 42 seconds     Basic metabolic panel [[id number]]  (Abnormal) Collected: 12/27/23 0830    Lab Status: Final result Specimen: Blood from Arm, Right Updated: 12/27/23 0851     Sodium 132 mmol/L      Potassium 4.3 mmol/L      Chloride 95 mmol/L      CO2 31 mmol/L      ANION GAP 6 mmol/L      BUN 12 mg/dL      Creatinine 0.70 mg/dL      Glucose 85 mg/dL      Calcium 9.4 mg/dL      eGFR 80 ml/min/1.73sq m     Narrative:      National Kidney Disease  Foundation guidelines for Chronic Kidney Disease (CKD):     Stage 1 with normal or high GFR (GFR > 90 mL/min/1.73 square meters)    Stage 2 Mild CKD (GFR = 60-89 mL/min/1.73 square meters)    Stage 3A Moderate CKD (GFR = 45-59 mL/min/1.73 square meters)    Stage 3B Moderate CKD (GFR = 30-44 mL/min/1.73 square meters)    Stage 4 Severe CKD (GFR = 15-29 mL/min/1.73 square meters)    Stage 5 End Stage CKD (GFR <15 mL/min/1.73 square meters)  Note: GFR calculation is accurate only with a steady state creatinine    CBC and differential [[id number]]  (Abnormal) Collected: 12/27/23 0830    Lab Status: Final result Specimen: Blood from Arm, Right Updated: 12/27/23 0837     WBC 7.89 Thousand/uL      RBC 3.48 Million/uL      Hemoglobin 10.0 g/dL      Hematocrit 31.8 %      MCV 91 fL      MCH 28.7 pg      MCHC 31.4 g/dL      RDW 17.0 %      MPV 8.7 fL      Platelets 311 Thousands/uL      nRBC 0 /100 WBCs      Neutrophils Relative 76 %      Immat GRANS % 2 %      Lymphocytes Relative 11 %      Monocytes Relative 9 %      Eosinophils Relative 1 %      Basophils Relative 1 %      Neutrophils Absolute 6.02 Thousands/µL      Immature Grans Absolute 0.15 Thousand/uL      Lymphocytes Absolute 0.86 Thousands/µL      Monocytes Absolute 0.71 Thousand/µL      Eosinophils Absolute 0.09 Thousand/µL      Basophils Absolute 0.06 Thousands/µL                    Imaging Studies:   Direct to CT: No  TRAUMA - CT head wo contrast   Final Result by Mark Dotson MD (12/27 0856)      No acute intracranial abnormality.                  Workstation performed: [id number]         TRAUMA - CT spine cervical wo contrast   Final Result by Mark Dotson MD (12/27 0858)      No cervical spine fracture or traumatic malalignment.                  Workstation performed: [id number]         XR Trauma chest portable   Final Result by Mark Dotson MD (12/27 0854)      No acute cardiopulmonary disease.                  Workstation performed: [id number]                Procedures  Procedures         ED Course  ED Course as of 12/27/23 1002   Wed Dec 27, 2023   0839 Hemoglobin(!): 10.0  Baseline for patient.    0853 Sodium(!): 132  Baseline for patient.            Medical Decision Making  Patient with head injury, on coumadin, will order CT head to r/o hemorrhage.  No other injuries.  Will discharge patient with head injury instructions and return precautions.      Amount and/or Complexity of Data Reviewed  External Data Reviewed: ECG.  Labs: ordered. Decision-making details documented in ED Course.  Radiology: ordered.  ECG/medicine tests: ordered and independent interpretation performed.                Disposition  [facility] Transfer: No  Final diagnoses:   Fall   Head injury     Time reflects when diagnosis was documented in both MDM as applicable and the Disposition within this note       Time User Action Codes Description Comment    12/27/2023  9:43 AM Katherine Bo Add [W19.XXXA] Fall     12/27/2023  9:43 AM Katherine Bo [S09.90XA] Head injury           ED Disposition       ED Disposition   Discharge    Condition   Stable    Date/Time   Wed Dec 27, 2023  9:43 AM    Comment   Apryl Casas discharge to home/self care.                   Follow-up Information       Follow up With Specialties Details Why Contact Info Additional Information    Michael Oliver MD Family Medicine Schedule an appointment as soon as possible for a visit in 2 days For recheck [address]  [facility] [address]  [phone]        [facility] Emergency Department Emergency Medicine Go to  If symptoms worsen [address] [address] [phone] [facility] Emergency Department, [address] [place], [place] [address]          Patient's Medications   Discharge Prescriptions    No medications on file     No discharge procedures on file.    PDMP Review       None            ED  Provider  Electronically Signed by           Katherine Bo PA-C  12/27/23 1002

## 2023-12-27 NOTE — DISCHARGE INSTRUCTIONS
Rest, tylenol for discomfort.  If change in behavior or vomiting return to ER.  Follow up with family doctor in 1-2 days for recheck.

## 2023-12-28 ENCOUNTER — TELEPHONE (OUTPATIENT)
Dept: NEPHROLOGY | Facility: CLINIC | Age: 83
End: 2023-12-28

## 2023-12-28 DIAGNOSIS — E87.1 HYPONATREMIA: Primary | ICD-10-CM

## 2023-12-28 NOTE — TELEPHONE ENCOUNTER
Lm for Apryl, advised sodium is stable at 133. Repeat labs have been added to epic.   - asked she call back if on any diuretics.    ----- Message from JEAN Lam sent at 12/28/2023 11:16 AM EST -----  Please let patient know that recent BMP reviewed and sodium stable at 133.  Please repeat BMP and ionized calcium prior to appt with dr awan in February. Ask if she is on any diuretics

## 2023-12-29 LAB
ATRIAL RATE: 78 BPM
QRS AXIS: 53 DEGREES
QRSD INTERVAL: 82 MS
QT INTERVAL: 342 MS
QTC INTERVAL: 406 MS
T WAVE AXIS: 65 DEGREES
VENTRICULAR RATE: 85 BPM

## 2024-01-10 ENCOUNTER — TELEPHONE (OUTPATIENT)
Dept: NEPHROLOGY | Facility: CLINIC | Age: 84
End: 2024-01-10

## 2024-01-10 NOTE — TELEPHONE ENCOUNTER
Pt's daughter Barbara called to report that the pt passed away last week and to make sure any future appts were cancelled. I gave her our condolences and made sure appts were cancelled. I updated pt's chart.
